# Patient Record
Sex: FEMALE | Race: ASIAN | NOT HISPANIC OR LATINO | Employment: UNEMPLOYED
[De-identification: names, ages, dates, MRNs, and addresses within clinical notes are randomized per-mention and may not be internally consistent; named-entity substitution may affect disease eponyms.]

---

## 2016-01-04 LAB — PAP-ABSTRACT: NORMAL

## 2017-12-03 ENCOUNTER — HEALTH MAINTENANCE LETTER (OUTPATIENT)
Age: 57
End: 2017-12-03

## 2018-02-06 ENCOUNTER — TRANSFERRED RECORDS (OUTPATIENT)
Dept: HEALTH INFORMATION MANAGEMENT | Facility: CLINIC | Age: 58
End: 2018-02-06

## 2019-01-04 ENCOUNTER — OFFICE VISIT (OUTPATIENT)
Dept: FAMILY MEDICINE | Facility: CLINIC | Age: 59
End: 2019-01-04
Payer: COMMERCIAL

## 2019-01-04 VITALS
WEIGHT: 160 LBS | BODY MASS INDEX: 28.35 KG/M2 | TEMPERATURE: 99.9 F | HEIGHT: 63 IN | OXYGEN SATURATION: 96 % | RESPIRATION RATE: 18 BRPM | HEART RATE: 67 BPM

## 2019-01-04 DIAGNOSIS — Z12.39 BREAST CANCER SCREENING: ICD-10-CM

## 2019-01-04 DIAGNOSIS — J20.9 ACUTE BRONCHITIS WITH SYMPTOMS > 10 DAYS: Primary | ICD-10-CM

## 2019-01-04 LAB
DEPRECATED S PYO AG THROAT QL EIA: NORMAL
SPECIMEN SOURCE: NORMAL

## 2019-01-04 PROCEDURE — 99203 OFFICE O/P NEW LOW 30 MIN: CPT | Performed by: PHYSICIAN ASSISTANT

## 2019-01-04 PROCEDURE — 87081 CULTURE SCREEN ONLY: CPT | Performed by: PHYSICIAN ASSISTANT

## 2019-01-04 PROCEDURE — 87880 STREP A ASSAY W/OPTIC: CPT | Performed by: PHYSICIAN ASSISTANT

## 2019-01-04 RX ORDER — AZITHROMYCIN 500 MG/1
500 TABLET, FILM COATED ORAL DAILY
Qty: 3 TABLET | Refills: 0 | Status: SHIPPED | OUTPATIENT
Start: 2019-01-04 | End: 2019-03-28

## 2019-01-04 RX ORDER — CODEINE PHOSPHATE AND GUAIFENESIN 10; 100 MG/5ML; MG/5ML
1-2 SOLUTION ORAL EVERY 4 HOURS PRN
Qty: 120 ML | Refills: 0 | Status: SHIPPED | OUTPATIENT
Start: 2019-01-04 | End: 2019-03-28

## 2019-01-04 ASSESSMENT — MIFFLIN-ST. JEOR: SCORE: 1263.5

## 2019-01-04 ASSESSMENT — PAIN SCALES - GENERAL: PAINLEVEL: MILD PAIN (3)

## 2019-01-04 NOTE — PROGRESS NOTES
"  SUBJECTIVE:   Gary Hinkle is a 58 year old female who presents to clinic today for the following health issues:      ENT Symptoms             Symptoms: cc Present Absent Comment   Fever/Chills  x  102 on monday   Fatigue  x     Muscle Aches  x     Eye Irritation  x  Dry eye   Sneezing  x     Nasal [place]/Drg  x  Only at night   Sinus Pressure/Pain  x     Loss of smell  x     Dental pain   x    Sore Throat  x     Swollen Glands       Ear Pain/Fullness  x     Cough  x     Wheeze  x     Chest Pain  x  Only when coughing   Shortness of breath  x     Rash  x     Other   x      Symptom duration:  5 days   Symptom severity:  moderate   Treatments tried:  Chinese herbal   Contacts:  [employer]         Hyperlipidemia Follow-Up      Rate your low fat/cholesterol diet?: fair    Taking statin?  Now    Other lipid medications/supplements?:  none      Amount of exercise or physical activity: work 4-5 days/week for an average of 4 hours before getting a break    Problems taking medications regularly: No    Medication side effects: none    Diet: regular (no restrictions)        Problem list and histories reviewed & adjusted, as indicated.  Additional history: as documented    ROS:  Constitutional, HEENT, cardiovascular, pulmonary, gi and gu systems are negative, except as otherwise noted.    OBJECTIVE:     Pulse 67   Temp 99.9  F (37.7  C) (Oral)   Resp 18   Ht 1.59 m (5' 2.6\")   Wt 72.6 kg (160 lb)   SpO2 96%   BMI 28.71 kg/m    Body mass index is 28.71 kg/m .  GEN: Well developed, well nourished in NAD.  HEENT: Normocephalic. Eyes: + conjunctival flushing noted. EARS: TMs WNL, Canals clear.  Nose: Edematous mucosa without lesion. No rhinorrhea. Mouth/Pharynx: sl cobblestoning and telangiectasia. No Percussed Sinus tenderness.  NECK: Supple with no anterior cervical lymphadenopathy.  No Thyromegaly  RESP: RUL wheeze with good air entry rest of fields.  SKIN: No Exanthem noted.      Diagnostic Test Results:  none "     ASSESSMENT/PLAN:   1. Acute bronchitis with symptoms > 10 days  - azithromycin (ZITHROMAX) 500 MG tablet; Take 1 tablet (500 mg) by mouth daily for 3 days  Dispense: 3 tablet; Refill: 0  - guaiFENesin-codeine (ROBITUSSIN AC) 100-10 MG/5ML solution; Take 5-10 mLs by mouth every 4 hours as needed for cough  Dispense: 120 mL; Refill: 0    2. Breast cancer screening  - MA SCREENING DIGITAL BILAT - Future  (s+30); Future    Use medication as directed.  Continue supportive OTC measures.   Follow up if symptoms should persist, change or worsen.  Patient amenable to this follow up plan.      Dottie Phillips PA-C  [facility]

## 2019-01-05 LAB
BACTERIA SPEC CULT: NORMAL
SPECIMEN SOURCE: NORMAL

## 2019-02-05 ENCOUNTER — ANCILLARY PROCEDURE (OUTPATIENT)
Dept: MAMMOGRAPHY | Facility: CLINIC | Age: 59
End: 2019-02-05
Payer: COMMERCIAL

## 2019-02-05 DIAGNOSIS — Z12.31 VISIT FOR SCREENING MAMMOGRAM: ICD-10-CM

## 2019-02-05 PROCEDURE — 77067 SCR MAMMO BI INCL CAD: CPT | Mod: TC

## 2019-03-28 ENCOUNTER — OFFICE VISIT (OUTPATIENT)
Dept: FAMILY MEDICINE | Facility: CLINIC | Age: 59
End: 2019-03-28
Payer: COMMERCIAL

## 2019-03-28 VITALS
DIASTOLIC BLOOD PRESSURE: 62 MMHG | BODY MASS INDEX: 28.89 KG/M2 | WEIGHT: 157 LBS | SYSTOLIC BLOOD PRESSURE: 102 MMHG | HEIGHT: 62 IN | RESPIRATION RATE: 16 BRPM | OXYGEN SATURATION: 97 % | TEMPERATURE: 96.2 F | HEART RATE: 66 BPM

## 2019-03-28 DIAGNOSIS — Z00.00 ROUTINE HISTORY AND PHYSICAL EXAMINATION OF ADULT: Primary | ICD-10-CM

## 2019-03-28 DIAGNOSIS — Z23 NEED FOR PROPHYLACTIC VACCINATION AND INOCULATION AGAINST INFLUENZA: ICD-10-CM

## 2019-03-28 DIAGNOSIS — Z11.4 SCREENING FOR HIV (HUMAN IMMUNODEFICIENCY VIRUS): ICD-10-CM

## 2019-03-28 DIAGNOSIS — E03.9 HYPOTHYROIDISM, UNSPECIFIED TYPE: ICD-10-CM

## 2019-03-28 DIAGNOSIS — E78.5 HYPERLIPIDEMIA LDL GOAL <130: ICD-10-CM

## 2019-03-28 DIAGNOSIS — Z12.11 SCREEN FOR COLON CANCER: ICD-10-CM

## 2019-03-28 DIAGNOSIS — Z12.4 SCREENING FOR MALIGNANT NEOPLASM OF CERVIX: ICD-10-CM

## 2019-03-28 DIAGNOSIS — Z11.59 NEED FOR HEPATITIS C SCREENING TEST: ICD-10-CM

## 2019-03-28 DIAGNOSIS — Z12.31 VISIT FOR SCREENING MAMMOGRAM: ICD-10-CM

## 2019-03-28 LAB
ALT SERPL W P-5'-P-CCNC: 45 U/L (ref 0–50)
AST SERPL W P-5'-P-CCNC: 25 U/L (ref 0–45)
CHOLEST SERPL-MCNC: 193 MG/DL
GLUCOSE SERPL-MCNC: 95 MG/DL (ref 70–99)
HCV AB SERPL QL IA: NONREACTIVE
HDLC SERPL-MCNC: 60 MG/DL
LDLC SERPL CALC-MCNC: 105 MG/DL
NONHDLC SERPL-MCNC: 133 MG/DL
TRIGL SERPL-MCNC: 138 MG/DL
TSH SERPL DL<=0.005 MIU/L-ACNC: 2.5 MU/L (ref 0.4–4)

## 2019-03-28 PROCEDURE — 84460 ALANINE AMINO (ALT) (SGPT): CPT | Performed by: FAMILY MEDICINE

## 2019-03-28 PROCEDURE — 82947 ASSAY GLUCOSE BLOOD QUANT: CPT | Performed by: FAMILY MEDICINE

## 2019-03-28 PROCEDURE — 36415 COLL VENOUS BLD VENIPUNCTURE: CPT | Performed by: FAMILY MEDICINE

## 2019-03-28 PROCEDURE — 86803 HEPATITIS C AB TEST: CPT | Performed by: FAMILY MEDICINE

## 2019-03-28 PROCEDURE — 84443 ASSAY THYROID STIM HORMONE: CPT | Performed by: FAMILY MEDICINE

## 2019-03-28 PROCEDURE — G0145 SCR C/V CYTO,THINLAYER,RESCR: HCPCS | Performed by: FAMILY MEDICINE

## 2019-03-28 PROCEDURE — 99396 PREV VISIT EST AGE 40-64: CPT | Performed by: FAMILY MEDICINE

## 2019-03-28 PROCEDURE — 84450 TRANSFERASE (AST) (SGOT): CPT | Performed by: FAMILY MEDICINE

## 2019-03-28 PROCEDURE — G0476 HPV COMBO ASSAY CA SCREEN: HCPCS | Performed by: FAMILY MEDICINE

## 2019-03-28 PROCEDURE — 80061 LIPID PANEL: CPT | Performed by: FAMILY MEDICINE

## 2019-03-28 RX ORDER — SIMVASTATIN 10 MG
10 TABLET ORAL AT BEDTIME
Qty: 90 TABLET | Refills: 3 | Status: SHIPPED | OUTPATIENT
Start: 2019-03-28 | End: 2020-03-24

## 2019-03-28 RX ORDER — CLOBETASOL PROPIONATE 0.5 MG/G
OINTMENT TOPICAL
COMMUNITY
Start: 2018-01-18 | End: 2021-06-04

## 2019-03-28 RX ORDER — LEVOTHYROXINE, LIOTHYRONINE 38; 9 UG/1; UG/1
TABLET ORAL
Refills: 0 | COMMUNITY
Start: 2019-03-08 | End: 2022-02-01

## 2019-03-28 RX ORDER — THYROID 60 MG/1
TABLET ORAL
COMMUNITY
Start: 2015-10-05 | End: 2022-04-13

## 2019-03-28 ASSESSMENT — MIFFLIN-ST. JEOR: SCORE: 1240.4

## 2019-03-28 NOTE — LETTER
[address] [place] [address]    March 29, 2019    Gary Hinkle  [address] [place] [id number]    Dear Gary,    Thyroid is stable   Cholesterol is stable   Continue same medicines   Consider switching Thyroid meds to synthroid    Enclosed is a copy of your results.     Results for orders placed or performed in visit on 03/28/19   Hepatitis C Screen Reflex to HCV RNA Quant and Genotype   Result Value Ref Range    Hepatitis C Antibody Nonreactive NR^Nonreactive   Lipid panel reflex to direct LDL Fasting   Result Value Ref Range    Cholesterol 193 <200 mg/dL    Triglycerides 138 <150 mg/dL    HDL Cholesterol 60 >49 mg/dL    LDL Cholesterol Calculated 105 (H) <100 mg/dL    Non HDL Cholesterol 133 (H) <130 mg/dL   TSH WITH FREE T4 REFLEX   Result Value Ref Range    TSH 2.50 0.40 - 4.00 mU/L   GLUCOSE   Result Value Ref Range    Glucose 95 70 - 99 mg/dL   ALT   Result Value Ref Range    ALT 45 0 - 50 U/L   AST   Result Value Ref Range    AST 25 0 - 45 U/L   If you have any questions or concerns, please call myself or my nurse at [phone].      Sincerely,      Opal Goff MD/sara

## 2019-03-28 NOTE — LETTER
April 4, 2019    Gary Hinkle  [address] [place] [address]    Dear ,  This letter is regarding your recent Pap smear (cervical cancer screening) and Human Papillomavirus (HPV) test.  We are happy to inform you that your Pap smear result is normal. Cervical cancer is closely linked with certain types of HPV. Your results showed no evidence of high-risk HPV.  Therefore we recommend you return in 3 years for your next pap smear.  You will still need to return to the clinic every year for an annual exam and other preventive tests.  If you have additional questions regarding this result, please call our registered nurse, Kailyn at [phone].  Sincerely,    Oapl Goff MD/[facility]

## 2019-03-28 NOTE — PROGRESS NOTES
SUBJECTIVE:   CC: Gary Hinkle is an 58 year old woman who presents for preventive health visit.     Healthy Habits:    Do you get at least three servings of calcium containing foods daily (dairy, green leafy vegetables, etc.)? yes    Amount of exercise or daily activities, outside of work: 5 day(s) per week    Problems taking medications regularly No    Medication side effects: No    Have you had an eye exam in the past two years? yes    Do you see a dentist twice per year? no    Do you have sleep apnea, excessive snoring or daytime drowsiness?yes      Today's PHQ-2 Score:   PHQ-2 ( 1999 [employer]) 3/28/2019 1/4/2019   Q1: Little interest or pleasure in doing things 0 0   Q2: Feeling down, depressed or hopeless 0 0   PHQ-2 Score 0 0     Hyperlipidemia Follow-Up      Rate your low fat/cholesterol diet?: good    Taking statin?  Yes, no muscle aches from statin    Other lipid medications/supplements?:  none    Hypothyroidism Follow-up      Since last visit, patient describes the following symptoms: Weight stable, no hair loss, no skin changes, no constipation, no loose stools  No problems with medicines    Abuse: Current or Past(Physical, Sexual or Emotional)- No  Do you feel safe in your environment? Yes    Social History     Tobacco Use     Smoking status: Never Smoker     Smokeless tobacco: Never Used   Substance Use Topics     Alcohol use: No     If you drink alcohol do you typically have >3 drinks per day or >7 drinks per week? No                     Reviewed orders with patient.  Reviewed health maintenance and updated orders accordingly - Yes  Labs reviewed in EPIC  BP Readings from Last 3 Encounters:   03/28/19 102/62   12/06/13 106/66   09/06/13 118/68    Wt Readings from Last 3 Encounters:   03/28/19 71.2 kg (157 lb)   01/04/19 72.6 kg (160 lb)   12/06/13 68 kg (150 lb)                  Patient Active Problem List   Diagnosis     Hyperlipidemia LDL goal <130     HDL deficiency     Hypothyroidism     Lichen  sclerosus of female genitalia     Constipation     Past Surgical History:   Procedure Laterality Date     APPENDECTOMY         Social History     Tobacco Use     Smoking status: Never Smoker     Smokeless tobacco: Never Used   Substance Use Topics     Alcohol use: No     Family History   Problem Relation Age of Onset     C.A.D. Mother         d age 80         Current Outpatient Medications   Medication Sig Dispense Refill     clobetasol (TEMOVATE) 0.05 % external ointment Apply BID sparingly       diclofenac (VOLTAREN) 1 % topical gel Apply 4 times daily       NP THYROID 60 MG tablet Take 1 tablet daily  0     simvastatin (ZOCOR) 10 MG tablet Take 1 tablet (10 mg) by mouth At Bedtime 90 tablet 3     thyroid (ARMOUR THYROID) 60 MG tablet daily       triamcinolone (KENALOG) 0.1 % ointment Apply sparingly to affected area three times daily for 14 days. (Patient not taking: Reported on 1/4/2019) 15 g 0     No Known Allergies  Recent Labs   Lab Test 12/06/13  0951 08/30/13  0944   LDL 95 131*   HDL 49* 42*   TRIG 95 268*   ALT 41  --    TSH 1.36 32.00*        Mammogram Screening: Patient over age 50, mutual decision to screen reflected in health maintenance.    Pertinent mammograms are reviewed under the imaging tab.  History of abnormal Pap smear: NO - age 30- 65 PAP every 3 years recommended  PAP / HPV 8/30/2013   PAP NIL     Reviewed and updated as needed this visit by clinical staff  Tobacco  Allergies  Meds  Med Hx  Surg Hx  Fam Hx  Soc Hx        Reviewed and updated as needed this visit by Provider        Past Medical History:   Diagnosis Date     Hyperlipidemia LDL goal < 130      Hypothyroidism       Past Surgical History:   Procedure Laterality Date     APPENDECTOMY         ROS:  CONSTITUTIONAL: NEGATIVE for fever, chills, change in weight  INTEGUMENTARY/SKIN: NEGATIVE for worrisome rashes, moles or lesions  EYES: NEGATIVE for vision changes or irritation  ENT: NEGATIVE for ear, mouth and throat  problems  RESP: NEGATIVE for significant cough or SOB  BREAST: NEGATIVE for masses, tenderness or discharge  CV: NEGATIVE for chest pain, palpitations or peripheral edema  GI: NEGATIVE for nausea, abdominal pain, heartburn, or change in bowel habits  : NEGATIVE for unusual urinary or vaginal symptoms. No vaginal bleeding.  MUSCULOSKELETAL: NEGATIVE for significant arthralgias or myalgia  NEURO: NEGATIVE for weakness, dizziness or paresthesias  PSYCHIATRIC: NEGATIVE for changes in mood or affect     OBJECTIVE:   There were no vitals taken for this visit.  EXAM:  GENERAL APPEARANCE: healthy, alert and no distress  EYES: Eyes grossly normal to inspection, PERRL and conjunctivae and sclerae normal  HENT: ear canals and TM's normal, nose and mouth without ulcers or lesions, oropharynx clear and oral mucous membranes moist  NECK: no adenopathy, no asymmetry, masses, or scars and thyroid normal to palpation  RESP: lungs clear to auscultation - no rales, rhonchi or wheezes  BREAST: normal without masses, tenderness or nipple discharge and no palpable axillary masses or adenopathy  CV: regular rate and rhythm, normal S1 S2, no S3 or S4, no murmur, click or rub, no peripheral edema and peripheral pulses strong  ABDOMEN: soft, nontender, no hepatosplenomegaly, no masses and bowel sounds normal   (female): normal female external genitalia, normal urethral meatus, vaginal mucosal atrophy noted, normal cervix, adnexae, and uterus without masses or abnormal discharge  MS: no musculoskeletal defects are noted and gait is age appropriate without ataxia  SKIN: no suspicious lesions or rashes  NEURO: Normal strength and tone, sensory exam grossly normal, mentation intact and speech normal  PSYCH: mentation appears normal and affect normal/bright    Diagnostic Test Results:  Pending     ASSESSMENT/PLAN:   1. Routine history and physical examination of adult      2. Hypothyroidism, unspecified type  Labs pending  Not sure why she is  "on armour Thyroid  Discussed changing to synthroid  - TSH WITH FREE T4 REFLEX    3. Hyperlipidemia LDL goal <130  Labs pending   - Lipid panel reflex to direct LDL Fasting  - GLUCOSE  - ALT  - AST  - simvastatin (ZOCOR) 10 MG tablet; Take 1 tablet (10 mg) by mouth At Bedtime  Dispense: 90 tablet; Refill: 3    4. Screening for malignant neoplasm of cervix    - Pap imaged thin layer screen with HPV - recommended age 30 - 65 years (select HPV order below)    5. Need for hepatitis C screening test    - Hepatitis C Screen Reflex to HCV RNA Quant and Genotype    6. Screen for colon cancer  UTD-see [facility] notes    7. Screening for HIV (human immunodeficiency virus)  Pt declined    8. Need for prophylactic vaccination and inoculation against influenza  Advised     9. Visit for screening mammogram  Advised       COUNSELING:   Reviewed preventive health counseling, as reflected in patient instructions       Regular exercise       Healthy diet/nutrition       Vision screening       Hearing screening       Osteoporosis Prevention/Bone Health       Colon cancer screening       The ASCVD Risk score (Sylwia SIMON Jr., et al., 2013) failed to calculate for the following reasons:    Cannot find a previous HDL lab    Cannot find a previous total cholesterol lab       Advance Care Planning    BP Readings from Last 1 Encounters:   12/06/13 106/66     Estimated body mass index is 28.71 kg/m  as calculated from the following:    Height as of 1/4/19: 1.59 m (5' 2.6\").    Weight as of 1/4/19: 72.6 kg (160 lb).           reports that  has never smoked. she has never used smokeless tobacco.      Counseling Resources:  ATP IV Guidelines  Pooled Cohorts Equation Calculator  Breast Cancer Risk Calculator  FRAX Risk Assessment  ICSI Preventive Guidelines  Dietary Guidelines for Americans, 2010  USDA's MyPlate  ASA Prophylaxis  Lung CA Screening    Opal Goff MD  [facility]  "

## 2019-04-01 LAB
COPATH REPORT: NORMAL
PAP: NORMAL

## 2019-04-02 LAB
FINAL DIAGNOSIS: NORMAL
HPV HR 12 DNA CVX QL NAA+PROBE: NEGATIVE
HPV16 DNA SPEC QL NAA+PROBE: NEGATIVE
HPV18 DNA SPEC QL NAA+PROBE: NEGATIVE
SPECIMEN DESCRIPTION: NORMAL
SPECIMEN SOURCE CVX/VAG CYTO: NORMAL

## 2019-06-06 ENCOUNTER — ANCILLARY PROCEDURE (OUTPATIENT)
Dept: GENERAL RADIOLOGY | Facility: CLINIC | Age: 59
End: 2019-06-06
Attending: FAMILY MEDICINE
Payer: COMMERCIAL

## 2019-06-06 ENCOUNTER — OFFICE VISIT (OUTPATIENT)
Dept: FAMILY MEDICINE | Facility: CLINIC | Age: 59
End: 2019-06-06
Payer: COMMERCIAL

## 2019-06-06 VITALS
DIASTOLIC BLOOD PRESSURE: 58 MMHG | OXYGEN SATURATION: 97 % | RESPIRATION RATE: 18 BRPM | BODY MASS INDEX: 29.54 KG/M2 | SYSTOLIC BLOOD PRESSURE: 96 MMHG | TEMPERATURE: 97.6 F | WEIGHT: 161.5 LBS | HEART RATE: 77 BPM

## 2019-06-06 DIAGNOSIS — M25.562 PAIN AND SWELLING OF LEFT KNEE: ICD-10-CM

## 2019-06-06 DIAGNOSIS — M25.462 PAIN AND SWELLING OF LEFT KNEE: Primary | ICD-10-CM

## 2019-06-06 DIAGNOSIS — M25.462 PAIN AND SWELLING OF LEFT KNEE: ICD-10-CM

## 2019-06-06 DIAGNOSIS — M25.562 PAIN AND SWELLING OF LEFT KNEE: Primary | ICD-10-CM

## 2019-06-06 PROCEDURE — 99214 OFFICE O/P EST MOD 30 MIN: CPT | Performed by: FAMILY MEDICINE

## 2019-06-06 PROCEDURE — 73562 X-RAY EXAM OF KNEE 3: CPT | Mod: LT

## 2019-06-06 RX ORDER — NAPROXEN 500 MG/1
500 TABLET ORAL 2 TIMES DAILY PRN
Qty: 60 TABLET | Refills: 1 | Status: SHIPPED | OUTPATIENT
Start: 2019-06-06 | End: 2019-08-06

## 2019-06-06 ASSESSMENT — PAIN SCALES - GENERAL: PAINLEVEL: EXTREME PAIN (8)

## 2019-06-06 NOTE — PROGRESS NOTES
Kori Hinkle is a 58 year old female who presents to clinic today for the following health issues:    Knee Pain   This is a new problem. The current episode started 1 to 4 weeks ago. The problem occurs constantly. The problem has been gradually worsening. The symptoms are aggravated by standing and walking. She has tried heat for the symptoms. The treatment provided no relief.      Patient presents with left knee pain that started about 2 weeks ago, no injury event recalled, has some swelling, no knee locking, pain is worse when standing for a long time, walking up stairs, worse at the end of the day.    BP Readings from Last 3 Encounters:   06/06/19 96/58   03/28/19 102/62   12/06/13 106/66    Wt Readings from Last 3 Encounters:   06/06/19 73.3 kg (161 lb 8 oz)   03/28/19 71.2 kg (157 lb)   01/04/19 72.6 kg (160 lb)      Reviewed and updated as needed this visit by Provider  Tobacco  Allergies  Meds  Problems  Med Hx  Surg Hx  Fam Hx         Review of Systems   ROS COMP: Constitutional, HEENT, cardiovascular, pulmonary, gi and gu systems are negative, except as otherwise noted.      Objective    BP 96/58   Pulse 77   Temp 97.6  F (36.4  C) (Oral)   Resp 18   Wt 73.3 kg (161 lb 8 oz)   SpO2 97%   BMI 29.54 kg/m     Physical Exam   GENERAL: healthy, alert and no distress  EYES: Eyes grossly normal to inspection, PERRL and conjunctivae and sclerae normal  NECK: no adenopathy, no asymmetry, masses, or scars and thyroid normal to palpation  MS: slight left knee effusion, no joint laxity, some pain elicited with patella manipulation, no IT band tenderness  SKIN: no suspicious lesions or rashes  NEURO: Normal strength and tone, mentation intact and speech normal  PSYCH: mentation appears normal, affect normal/bright          Assessment & Plan       ICD-10-CM    1. Pain and swelling of left knee M25.562 XR Knee Left 3 Views    M25.462 naproxen (NAPROSYN) 500 MG tablet     XR shows arthritis,  recommend ice, PT, NSAIDS, low impact exercises, may desire knee injections in the future.  condroitin tabs    Follow up if symptoms worsen or fail to improve.     Return in about 3 months (around 9/6/2019) for If symptoms persist.    Kaushik Arndt MD  [facility]

## 2019-06-06 NOTE — PATIENT INSTRUCTIONS
Patient Education     Knee Pain with Uncertain Cause    There are several common causes for knee pain. These can include:    A sprain of the ligaments that support the joint    An injury to the cartilage lining of the joint    Arthritis from wear-and-tear or inflammation  There are other causes as well. There may also be swelling, reduced movement of the knee joint, and pain with walking. A definite diagnosis will still need to be made. If your symptoms don't improve, further follow-up and testing may be needed.  Home care    Stay off the injured leg as much as possible until pain improves.    Apply an ice pack over the injured area for 15 to 20 minutes every 3 to 6 hours. You should do this for the first 24 to 48 hours. You can make an ice pack by filling a plastic bag that seals at the top with ice cubes and then wrapping it with a thin towel. Continue to use ice packs for relief of pain and swelling as needed. As the ice melts, be careful not to get your wrap, splint, or cast wet. After 48 hours, apply heat (warm shower or warm bath) for 15 to 20 minutes several times a day, or alternate ice and heat. If you have to wear a hook-and-loop knee brace, you can open it to apply the ice pack, or heat, directly to the knee. Never put ice directly on the skin. Always wrap the ice in a towel or other type of cloth.    You may use over-the-counter pain medicine to control pain, unless another pain medicine was prescribed. If you have chronic liver or kidney disease or ever had a stomach ulcer or gastrointestinal bleeding, talk with your healthcare provider before using these medicines.    If crutches or a walker have been recommended, don't put weight on the injured leg until you can do so without pain. Check with your healthcare provider before returning to sports or full work duties.    If you have a hook-and-loop knee brace, you can remove it to bathe and sleep, unless told otherwise.  Follow-up care  Follow up with  your healthcare provider as advised. This is usually within 1 to 2 weeks.  If X-rays were taken, you will be told of any new findings that may affect your care  Call 911  Call 911 if you have:    Shortness of breath    Chest pain  When to seek medical advice  Call your healthcare provider right away if any of these occur:    Toes or foot becomes swollen, cold, blue, numb, or tingly    Pain or swelling spreads over the knee or calf    Warmth or redness appears over the knee or calf    Other joints become painful    Rash appears    Fever of 100.4 F (38 C) or higher, or as directed by your healthcare provider    Chills  Date Last Reviewed: 5/1/2018 2000-2018 [employer]. [address] [place] [address], [place] [address]. All rights reserved. This information is not intended as a substitute for professional medical care. Always follow your healthcare professional's instructions.

## 2019-06-10 DIAGNOSIS — M17.12 PATELLOFEMORAL ARTHRITIS OF LEFT KNEE: Primary | ICD-10-CM

## 2019-06-13 ENCOUNTER — THERAPY VISIT (OUTPATIENT)
Dept: PHYSICAL THERAPY | Facility: CLINIC | Age: 59
End: 2019-06-13
Attending: FAMILY MEDICINE
Payer: COMMERCIAL

## 2019-06-13 DIAGNOSIS — M76.899 HAMSTRING TENDINITIS: ICD-10-CM

## 2019-06-13 DIAGNOSIS — M17.12 PATELLOFEMORAL ARTHRITIS OF LEFT KNEE: ICD-10-CM

## 2019-06-13 DIAGNOSIS — M25.562 LEFT KNEE PAIN: ICD-10-CM

## 2019-06-13 PROCEDURE — 97161 PT EVAL LOW COMPLEX 20 MIN: CPT | Mod: GP | Performed by: PHYSICAL THERAPIST

## 2019-06-13 PROCEDURE — 97110 THERAPEUTIC EXERCISES: CPT | Mod: GP | Performed by: PHYSICAL THERAPIST

## 2019-06-13 ASSESSMENT — ACTIVITIES OF DAILY LIVING (ADL)
WEAKNESS: THE SYMPTOM AFFECTS MY ACTIVITY SLIGHTLY
KNEEL ON THE FRONT OF YOUR KNEE: ACTIVITY IS VERY DIFFICULT
GO UP STAIRS: ACTIVITY IS FAIRLY DIFFICULT
LIMPING: THE SYMPTOM AFFECTS MY ACTIVITY MODERATELY
KNEE_ACTIVITY_OF_DAILY_LIVING_SUM: 30
AS_A_RESULT_OF_YOUR_KNEE_INJURY,_HOW_WOULD_YOU_RATE_YOUR_CURRENT_LEVEL_OF_DAILY_ACTIVITY?: ABNORMAL
GIVING WAY, BUCKLING OR SHIFTING OF KNEE: THE SYMPTOM AFFECTS MY ACTIVITY SLIGHTLY
RISE FROM A CHAIR: ACTIVITY IS FAIRLY DIFFICULT
WALK: ACTIVITY IS FAIRLY DIFFICULT
HOW_WOULD_YOU_RATE_THE_CURRENT_FUNCTION_OF_YOUR_KNEE_DURING_YOUR_USUAL_DAILY_ACTIVITIES_ON_A_SCALE_FROM_0_TO_100_WITH_100_BEING_YOUR_LEVEL_OF_KNEE_FUNCTION_PRIOR_TO_YOUR_INJURY_AND_0_BEING_THE_INABILITY_TO_PERFORM_ANY_OF_YOUR_USUAL_DAILY_ACTIVITIES?: 40
GO DOWN STAIRS: ACTIVITY IS FAIRLY DIFFICULT
STIFFNESS: THE SYMPTOM AFFECTS MY ACTIVITY MODERATELY
SIT WITH YOUR KNEE BENT: ACTIVITY IS FAIRLY DIFFICULT
SQUAT: ACTIVITY IS VERY DIFFICULT
KNEE_ACTIVITY_OF_DAILY_LIVING_SCORE: 42.86
SWELLING: THE SYMPTOM AFFECTS MY ACTIVITY SLIGHTLY
PAIN: THE SYMPTOM AFFECTS MY ACTIVITY MODERATELY
RAW_SCORE: 30
STAND: ACTIVITY IS SOMEWHAT DIFFICULT
HOW_WOULD_YOU_RATE_THE_OVERALL_FUNCTION_OF_YOUR_KNEE_DURING_YOUR_USUAL_DAILY_ACTIVITIES?: ABNORMAL

## 2019-06-13 NOTE — PROGRESS NOTES
[facility] Initial Evaluation  Subjective:                                     General health as reported by patient is good.  Pertinent medical history includes:  Thyroid problems.  Medical allergies: no.  Other surgeries include:  None reported.  Current medications:  Anti-inflammatory and thyroid medication.  Current occupation is Food prep.                                    Objective:  System    Physical Exam    General     ROS    Assessment/Plan:

## 2019-06-13 NOTE — LETTER
SRAVANI CAMPOVERDE PT  [address]  [address]  [place] [address]  [phone]    2019    Re: Gary Hinkle   :   1960  MRN:  [id number]   REFERRING PHYSICIAN:   MD SRAVANI Ladd PT  Date of Initial Evaluation:  2019  Visits:  Rxs Used: 1  Reason for Referral:     Patellofemoral arthritis of left knee  Left knee pain  Hamstring tendinitis    EVALUATION SUMMARY    [facility] Initial Evaluation  Subjective:  Gary Hinkle is a 58 year old female with a left knee condition.  Condition occurred with:  Insidious onset.  Condition occurred: for unknown reasons.  This is a new condition  Patient reports onset of left knee pain in May 2019 without a specific mechanism of injury or change in daily routine..    Patient reports pain:  Anterior and lateral.  Radiates to:  Knee.  Pain is described as aching and is constant and reported as 6/10.  Associated symptoms:  Loss of motion/stiffness and buckling/giving out. Pain is worse during the day.  Symptoms are exacerbated by ascending stairs, descending stairs, standing and walking   Since onset symptoms are unchanged.  Special tests:  X-ray (see epic).  General health as reported by patient is good.                  Red flags:  None as reported by the patient.    Objective:  Gait:    Deviations:  Knee:  Knee extension decr L    Knee Evaluation:  ROM:  Strength wnl knee: grossly 4/5 on left with fair quad set.  AROM  Extension: Left: 10 deg from straight    Right:   Flexion: Left: 100 deg   Right:  Pain: reported at end range left knee flexion and extension    Ligament Testing:  Normal    Special Tests:   Left knee positive for the following special tests:  Meniscal (OA lateral compartment)      Re: Gary Hinkle   :   1960    Palpation:    Left knee tenderness present at:  Lateral Joint Line and Biceps Femoral    Edema:  Normal  Mobility Testing:  Normal  Functional Testing:  not  assessed    Assessment/Plan:    Patient is a 58 year old female with left side knee complaints.    Patient has the following significant findings with corresponding treatment plan.                Diagnosis 1:  Left knee pain  Pain -  hot/cold therapy, manual therapy, splint/taping/bracing/orthotics, self management, education and home program  Decreased ROM/flexibility - manual therapy, therapeutic exercise, therapeutic activity and home program  Decreased strength - therapeutic exercise, therapeutic activities and home program  Impaired gait - home program  Impaired muscle performance - neuro re-education and home program  Decreased function - therapeutic activities and home program    Therapy Evaluation Codes:   1) History comprised of:   Personal factors that impact the plan of care:      None.    Comorbidity factors that impact the plan of care are:      None.     Medications impacting care: None.  2) Examination of Body Systems comprised of:   Body structures and functions that impact the plan of care:      Knee.   Activity limitations that impact the plan of care are:      Bathing, Dressing, Squatting/kneeling, Stairs, Standing and Walking.  3) Clinical presentation characteristics are:   Stable/Uncomplicated.  4) Decision-Making    Low complexity using standardized patient assessment instrument and/or measureable assessment of functional outcome.  Cumulative Therapy Evaluation is: Low complexity.    Previous and current functional limitations:  (See Goal Flow Sheet for this information)    Short term and Long term goals: (See Goal Flow Sheet for this information)     Communication ability:  Patient needs an  to clearly communicate and understand verbal and written communication and follow directions correctly.  Treatment Explanation - The following has been discussed with the patient:   RX ordered/plan of care  Anticipated outcomes  Possible risks and side effects  This patient would benefit from  PT intervention to resume normal activities.   Rehab potential is good.    Re: Gary Hinkle   :   1960    Frequency:  1 X week, once daily  Duration:  for 6 weeks  Discharge Plan:  Achieve all LTG.  Independent in home treatment program.  Reach maximal therapeutic benefit.    Please refer to the daily flowsheet for treatment today, total treatment time and time spent performing 1:1 timed codes.       Thank you for your referral.    INQUIRIES  Therapist: MARTITA Dutton PT  [address] [facility] [address] [facility] [address] [facility]  [address]  [place] [address]  Phone: [phone]  Fax: [phone]

## 2019-06-13 NOTE — PROGRESS NOTES
[facility] Initial Evaluation  Subjective:    Gary Hinkle is a 58 year old female with a left knee condition.  Condition occurred with:  Insidious onset.  Condition occurred: for unknown reasons.  This is a new condition  Patient reports onset of left knee pain in May 2019 without a specific mechanism of injury or change in daily routine..    Patient reports pain:  Anterior and lateral.  Radiates to:  Knee.  Pain is described as aching and is constant and reported as 6/10.  Associated symptoms:  Loss of motion/stiffness and buckling/giving out. Pain is worse during the day.  Symptoms are exacerbated by ascending stairs, descending stairs, standing and walking   Since onset symptoms are unchanged.  Special tests:  X-ray (see epic).      General health as reported by patient is good.                      Red flags:  None as reported by the patient.                        Objective:    Gait:      Deviations:  Knee:  Knee extension decr L                                                      Knee Evaluation:  ROM:  Strength wnl knee: grossly 4/5 on left with fair quad set.  AROM      Extension: Left: 10 deg from straight    Right:   Flexion: Left: 100 deg   Right:    Pain: reported at end range left knee flexion and extension      Ligament Testing:  Normal                Special Tests:   Left knee positive for the following special tests:  Meniscal (OA lateral compartment)    Palpation:    Left knee tenderness present at:  Lateral Joint Line and Biceps Femoral    Edema:  Normal    Mobility Testing:  Normal            Functional Testing:  not assessed                  General     ROS    Assessment/Plan:    Patient is a 58 year old female with left side knee complaints.    Patient has the following significant findings with corresponding treatment plan.                Diagnosis 1:  Left knee pain  Pain -  hot/cold therapy, manual therapy, splint/taping/bracing/orthotics, self management, education and  home program  Decreased ROM/flexibility - manual therapy, therapeutic exercise, therapeutic activity and home program  Decreased strength - therapeutic exercise, therapeutic activities and home program  Impaired gait - home program  Impaired muscle performance - neuro re-education and home program  Decreased function - therapeutic activities and home program    Therapy Evaluation Codes:   1) History comprised of:   Personal factors that impact the plan of care:      None.    Comorbidity factors that impact the plan of care are:      None.     Medications impacting care: None.  2) Examination of Body Systems comprised of:   Body structures and functions that impact the plan of care:      Knee.   Activity limitations that impact the plan of care are:      Bathing, Dressing, Squatting/kneeling, Stairs, Standing and Walking.  3) Clinical presentation characteristics are:   Stable/Uncomplicated.  4) Decision-Making    Low complexity using standardized patient assessment instrument and/or measureable assessment of functional outcome.  Cumulative Therapy Evaluation is: Low complexity.    Previous and current functional limitations:  (See Goal Flow Sheet for this information)    Short term and Long term goals: (See Goal Flow Sheet for this information)     Communication ability:  Patient needs an  to clearly communicate and understand verbal and written communication and follow directions correctly.  Treatment Explanation - The following has been discussed with the patient:   RX ordered/plan of care  Anticipated outcomes  Possible risks and side effects  This patient would benefit from PT intervention to resume normal activities.   Rehab potential is good.    Frequency:  1 X week, once daily  Duration:  for 6 weeks  Discharge Plan:  Achieve all LTG.  Independent in home treatment program.  Reach maximal therapeutic benefit.    Please refer to the daily flowsheet for treatment today, total treatment time and time  spent performing 1:1 timed codes.

## 2019-06-20 ENCOUNTER — THERAPY VISIT (OUTPATIENT)
Dept: PHYSICAL THERAPY | Facility: CLINIC | Age: 59
End: 2019-06-20
Payer: COMMERCIAL

## 2019-06-20 DIAGNOSIS — M76.899 HAMSTRING TENDINITIS: ICD-10-CM

## 2019-06-20 DIAGNOSIS — M25.562 LEFT KNEE PAIN: ICD-10-CM

## 2019-06-20 PROCEDURE — 97110 THERAPEUTIC EXERCISES: CPT | Mod: GP

## 2019-06-27 ENCOUNTER — THERAPY VISIT (OUTPATIENT)
Dept: PHYSICAL THERAPY | Facility: CLINIC | Age: 59
End: 2019-06-27
Payer: COMMERCIAL

## 2019-06-27 DIAGNOSIS — M76.899 HAMSTRING TENDINITIS: ICD-10-CM

## 2019-06-27 DIAGNOSIS — M25.562 LEFT KNEE PAIN: ICD-10-CM

## 2019-06-27 PROCEDURE — 97530 THERAPEUTIC ACTIVITIES: CPT | Mod: GP

## 2019-07-11 ENCOUNTER — THERAPY VISIT (OUTPATIENT)
Dept: PHYSICAL THERAPY | Facility: CLINIC | Age: 59
End: 2019-07-11
Payer: COMMERCIAL

## 2019-07-11 DIAGNOSIS — M25.562 LEFT KNEE PAIN: ICD-10-CM

## 2019-07-11 DIAGNOSIS — M76.899 HAMSTRING TENDINITIS: ICD-10-CM

## 2019-07-11 PROCEDURE — 97110 THERAPEUTIC EXERCISES: CPT | Mod: GP | Performed by: PHYSICAL THERAPIST

## 2019-07-11 PROCEDURE — 97530 THERAPEUTIC ACTIVITIES: CPT | Mod: GP | Performed by: PHYSICAL THERAPIST

## 2019-07-11 NOTE — PROGRESS NOTES
Subjective:  HPI                    Objective:  System    Physical Exam    General     ROS    Assessment/Plan:    DISCHARGE REPORT    Progress reporting period is from 6/27/2019 to 7/11/2019.       SUBJECTIVE  Subjective changes noted by patient:  Subjective: Patient reports no longer having knee pain and has no complaints at this time.     Current pain level is  Current Pain level: 0/10.     Previous pain level was   Initial Pain level: 6/10.   Changes in function:  Yes (See Goal flowsheet attached for changes in current functional level)  Adverse reaction to treatment or activity: None    OBJECTIVE  Changes noted in objective findings:  Yes,   Objective: unremarkable knee exam. AROM and strength WNL throughout bilaterally. unremarkable knee special testing     ASSESSMENT/PLAN  Updated problem list and treatment plan: Diagnosis 1:  Left knee pain    STG/LTGs have been met or progress has been made towards goals:  Yes (See Goal flow sheet completed today.)  Assessment of Progress: The patient's condition is improving.  The patient has met all of their long term goals.  Self Management Plans:  Patient is independent in a home treatment program.  Patient is independent in self management of symptoms.  I have re-evaluated this patient and find that the nature, scope, duration and intensity of the therapy is appropriate for the medical condition of the patient.  Gary continues to require the following intervention to meet STG and LTG's:  PT intervention is no longer required to meet STG/LTG.    Recommendations:  This patient is ready to be discharged from therapy and continue their home treatment program.    Please refer to the daily flowsheet for treatment today, total treatment time and time spent performing 1:1 timed codes.

## 2019-08-02 DIAGNOSIS — M25.562 PAIN AND SWELLING OF LEFT KNEE: ICD-10-CM

## 2019-08-02 DIAGNOSIS — M25.462 PAIN AND SWELLING OF LEFT KNEE: ICD-10-CM

## 2019-08-02 NOTE — TELEPHONE ENCOUNTER
"Routing refill request to provider for review/approval because:  Labs not current:      Requested Prescriptions   Pending Prescriptions Disp Refills     naproxen (NAPROSYN) 500 MG tablet  Last Written Prescription Date:  6/6/19  Last Fill Quantity: 60,  # refills: 1   Last office visit: 6/6/2019 with prescribing provider:     Future Office Visit:   60 tablet 1     Sig: Take 1 tablet (500 mg) by mouth 2 times daily as needed for moderate pain Take with food and a full glass of water       NSAID Medications Failed - 8/2/2019  9:34 AM        Failed - Normal CBC on file in past 12 months     Recent Labs   Lab Test 12/06/13  0951   WBC 4.0   RBC 4.37   HGB 13.3   HCT 39.7                    Failed - Normal serum creatinine on file in past 12 months     No lab results found.          Passed - Blood pressure under 140/90 in past 12 months     BP Readings from Last 3 Encounters:   06/06/19 96/58   03/28/19 102/62   12/06/13 106/66                 Passed - Normal ALT on file in past 12 months     Recent Labs   Lab Test 03/28/19  0835   ALT 45             Passed - Normal AST on file in past 12 months     Recent Labs   Lab Test 03/28/19  0835   AST 25             Passed - Recent (12 mo) or future (30 days) visit within the authorizing provider's specialty     Patient had office visit in the last 12 months or has a visit in the next 30 days with authorizing provider or within the authorizing provider's specialty.  See \"Patient Info\" tab in inbasket, or \"Choose Columns\" in Meds & Orders section of the refill encounter.              Passed - Patient is age 6-64 years        Passed - Medication is active on med list        Passed - No active pregnancy on record        Passed - No positive pregnancy test in past 12 months        Flower Aldrich, RN - BC      "

## 2019-08-06 RX ORDER — NAPROXEN 500 MG/1
500 TABLET ORAL 2 TIMES DAILY PRN
Qty: 60 TABLET | Refills: 1 | Status: SHIPPED | OUTPATIENT
Start: 2019-08-06 | End: 2019-10-28

## 2019-08-29 ENCOUNTER — TELEPHONE (OUTPATIENT)
Dept: FAMILY MEDICINE | Facility: CLINIC | Age: 59
End: 2019-08-29

## 2019-08-29 NOTE — TELEPHONE ENCOUNTER
Aspirin 81 mg delayed release. Take 1 tablet by mouth once daily with a meal.           Last Written Prescription Date:  na  Last Fill Quantity: na,   # refills: na  Last Office Visit: 6/6/19  Future Office visit:       Routing refill request to provider for review/approval because:  Drug not active on patient's medication list

## 2019-10-24 DIAGNOSIS — M25.562 PAIN AND SWELLING OF LEFT KNEE: ICD-10-CM

## 2019-10-24 DIAGNOSIS — M25.462 PAIN AND SWELLING OF LEFT KNEE: ICD-10-CM

## 2019-10-28 RX ORDER — NAPROXEN 500 MG/1
500 TABLET ORAL 2 TIMES DAILY PRN
Qty: 60 TABLET | Refills: 1 | Status: SHIPPED | OUTPATIENT
Start: 2019-10-28 | End: 2020-01-23

## 2019-10-28 NOTE — TELEPHONE ENCOUNTER
"Routing refill request to provider for review/approval because:  Labs not current:  CBC, Cr    Requested Prescriptions   Pending Prescriptions Disp Refills     naproxen (NAPROSYN) 500 MG tablet 60 tablet 1     Sig: Take 1 tablet (500 mg) by mouth 2 times daily as needed for moderate pain Take with food and a full glass of water       NSAID Medications Failed - 10/24/2019  5:07 PM        Failed - Normal CBC on file in past 12 months     Recent Labs   Lab Test 12/06/13  0951   WBC 4.0   RBC 4.37   HGB 13.3   HCT 39.7                    Failed - Normal serum creatinine on file in past 12 months     No lab results found.          Passed - Blood pressure under 140/90 in past 12 months     BP Readings from Last 3 Encounters:   06/06/19 96/58   03/28/19 102/62   12/06/13 106/66                 Passed - Normal ALT on file in past 12 months     Recent Labs   Lab Test 03/28/19  0835   ALT 45             Passed - Normal AST on file in past 12 months     Recent Labs   Lab Test 03/28/19  0835   AST 25             Passed - Recent (12 mo) or future (30 days) visit within the authorizing provider's specialty     Patient has had an office visit with the authorizing provider or a provider within the authorizing providers department within the previous 12 mos or has a future within next 30 days. See \"Patient Info\" tab in inbasket, or \"Choose Columns\" in Meds & Orders section of the refill encounter.              Passed - Patient is age 6-64 years        Passed - Medication is active on med list        Passed - No active pregnancy on record        Passed - No positive pregnancy test in past 12 months        Kimmie Johnston RN  "

## 2020-01-02 ENCOUNTER — OFFICE VISIT (OUTPATIENT)
Dept: OPHTHALMOLOGY | Facility: CLINIC | Age: 60
End: 2020-01-02
Payer: COMMERCIAL

## 2020-01-02 DIAGNOSIS — H02.834 DERMATOCHALASIS OF BOTH UPPER EYELIDS: ICD-10-CM

## 2020-01-02 DIAGNOSIS — Z01.00 EXAMINATION OF EYES AND VISION: Primary | ICD-10-CM

## 2020-01-02 DIAGNOSIS — H52.223 REGULAR ASTIGMATISM OF BOTH EYES: ICD-10-CM

## 2020-01-02 DIAGNOSIS — H52.4 PRESBYOPIA: ICD-10-CM

## 2020-01-02 DIAGNOSIS — H02.831 DERMATOCHALASIS OF BOTH UPPER EYELIDS: ICD-10-CM

## 2020-01-02 DIAGNOSIS — H52.13 HIGH MYOPIA, BILATERAL: ICD-10-CM

## 2020-01-02 DIAGNOSIS — H43.812 POSTERIOR VITREOUS DETACHMENT OF LEFT EYE: ICD-10-CM

## 2020-01-02 PROCEDURE — 92004 COMPRE OPH EXAM NEW PT 1/>: CPT | Performed by: STUDENT IN AN ORGANIZED HEALTH CARE EDUCATION/TRAINING PROGRAM

## 2020-01-02 PROCEDURE — 92015 DETERMINE REFRACTIVE STATE: CPT | Performed by: STUDENT IN AN ORGANIZED HEALTH CARE EDUCATION/TRAINING PROGRAM

## 2020-01-02 ASSESSMENT — EXTERNAL EXAM - LEFT EYE: OS_EXAM: NORMAL

## 2020-01-02 ASSESSMENT — TONOMETRY
OD_IOP_MMHG: 18
IOP_METHOD: APPLANATION
OS_IOP_MMHG: 18

## 2020-01-02 ASSESSMENT — REFRACTION_MANIFEST
OS_AXIS: 085
OS_CYLINDER: +1.75
OS_SPHERE: -7.00
OD_SPHERE: -9.75
OS_ADD: +2.50
OD_ADD: +2.50
OD_CYLINDER: +1.75
OD_AXIS: 073

## 2020-01-02 ASSESSMENT — REFRACTION_WEARINGRX
OD_AXIS: 073
OS_CYLINDER: +1.75
OS_AXIS: 085
OD_CYLINDER: +1.75
OS_SPHERE: -7.00
OD_SPHERE: -9.75

## 2020-01-02 ASSESSMENT — CUP TO DISC RATIO
OD_RATIO: 0.25
OS_RATIO: 0.2

## 2020-01-02 ASSESSMENT — VISUAL ACUITY
METHOD: SNELLEN - LINEAR
OS_CC: 20/25
CORRECTION_TYPE: GLASSES
OD_CC: 20/20-2

## 2020-01-02 ASSESSMENT — CONF VISUAL FIELD
OD_NORMAL: 1
OS_NORMAL: 1

## 2020-01-02 ASSESSMENT — SLIT LAMP EXAM - LIDS
COMMENTS: 1+ DERMATOCHALASIS
COMMENTS: 1+ DERMATOCHALASIS

## 2020-01-02 ASSESSMENT — EXTERNAL EXAM - RIGHT EYE: OD_EXAM: NORMAL

## 2020-01-02 NOTE — LETTER
1/2/2020         RE: Gary Hinkle  [address] [place] [id number]        Dear Colleague,    Thank you for referring your patient, Gary Hinkle, to the [facility]. Please see a copy of my visit note below.     Current Eye Medications:  no     Subjective:  Comprehensive eye exam.   Pt reports that she sees well in distance, however she has to take her glasses off to see to read.    Saw optometrist at [facility] in 2016 (note in Epic). No previous eye injuries or surgeries.      Objective:  See Ophthalmology Exam.       Assessment:  Gary Hinkle is a 59 year old female who presents with:   Encounter Diagnoses   Name Primary?     Examination of eyes and vision      High myopia of both eyes      Regular astigmatism of both eyes      Presbyopia        Posterior vitreous detachment of left eye      Dermatochalasis of both upper eyelids        Plan:  Glasses prescription given - optional to update    Daria Negrete MD  [phone]        Again, thank you for allowing me to participate in the care of your patient.        Sincerely,        Daria Negrete MD

## 2020-01-02 NOTE — PROGRESS NOTES
Current Eye Medications:  no     Subjective:  Comprehensive eye exam.   Pt reports that she sees well in distance, however she has to take her glasses off to see to read.    Saw optometrist at [facility] in 2016 (note in Epic). No previous eye injuries or surgeries.      Objective:  See Ophthalmology Exam.       Assessment:  Gary Hinkle is a 59 year old female who presents with:   Encounter Diagnoses   Name Primary?     Examination of eyes and vision      High myopia of both eyes      Regular astigmatism of both eyes      Presbyopia        Posterior vitreous detachment of left eye      Dermatochalasis of both upper eyelids        Plan:  Glasses prescription given - optional to update    Daria Negrete MD  [phone]

## 2020-02-27 DIAGNOSIS — M25.462 PAIN AND SWELLING OF LEFT KNEE: ICD-10-CM

## 2020-02-27 DIAGNOSIS — M25.562 PAIN AND SWELLING OF LEFT KNEE: ICD-10-CM

## 2020-02-28 NOTE — TELEPHONE ENCOUNTER
"Requested Prescriptions   Pending Prescriptions Disp Refills     naproxen (NAPROSYN) 500 MG tablet [Pharmacy Med Name: Naproxen Oral Tablet 500 MG] 60 tablet 0     Sig: Take 1 tablet (500 mg) by mouth 2 times daily as needed for moderate pain Take with food and a full glass of water       NSAID Medications Failed - 2/27/2020  1:24 PM        Failed - Normal CBC on file in past 12 months     Recent Labs   Lab Test 12/06/13  0951   WBC 4.0   RBC 4.37   HGB 13.3   HCT 39.7                    Failed - Normal serum creatinine on file in past 12 months     No lab results found.          Passed - Blood pressure under 140/90 in past 12 months     BP Readings from Last 3 Encounters:   06/06/19 96/58   03/28/19 102/62   12/06/13 106/66                 Passed - Normal ALT on file in past 12 months     Recent Labs   Lab Test 03/28/19  0835   ALT 45             Passed - Normal AST on file in past 12 months     Recent Labs   Lab Test 03/28/19  0835   AST 25             Passed - Recent (12 mo) or future (30 days) visit within the authorizing provider's specialty     Patient has had an office visit with the authorizing provider or a provider within the authorizing providers department within the previous 12 mos or has a future within next 30 days. See \"Patient Info\" tab in inbasket, or \"Choose Columns\" in Meds & Orders section of the refill encounter.              Passed - Patient is age 6-64 years        Passed - Medication is active on med list        Passed - No active pregnancy on record        Passed - No positive pregnancy test in past 12 months          "

## 2020-03-02 RX ORDER — NAPROXEN 500 MG/1
TABLET ORAL
Qty: 60 TABLET | Refills: 0 | Status: SHIPPED | OUTPATIENT
Start: 2020-03-02 | End: 2021-04-26

## 2020-03-23 DIAGNOSIS — E78.5 HYPERLIPIDEMIA LDL GOAL <130: ICD-10-CM

## 2020-03-24 RX ORDER — SIMVASTATIN 10 MG
10 TABLET ORAL AT BEDTIME
Qty: 90 TABLET | Refills: 0 | Status: SHIPPED | OUTPATIENT
Start: 2020-03-24 | End: 2020-04-21

## 2020-07-29 DIAGNOSIS — E78.5 HYPERLIPIDEMIA LDL GOAL <130: ICD-10-CM

## 2020-08-03 RX ORDER — SIMVASTATIN 10 MG
TABLET ORAL
Qty: 30 TABLET | Refills: 0 | Status: SHIPPED | OUTPATIENT
Start: 2020-08-03 | End: 2020-08-20

## 2020-08-03 NOTE — TELEPHONE ENCOUNTER
Called with the aid of a Mandarin .    Spoke with Gary  to schedule an appointment.  Linda Cordova,     Needs an appointment for refills.    Next 5 appointments (look out 90 days)    Aug 20, 2020 10:00 AM CDT  Office Visit with Opal Goff MD  [facility] ([facility]) [address] [facility] [address]  [phone]       Linda Cordova,

## 2020-08-20 ENCOUNTER — OFFICE VISIT (OUTPATIENT)
Dept: FAMILY MEDICINE | Facility: CLINIC | Age: 60
End: 2020-08-20
Payer: COMMERCIAL

## 2020-08-20 VITALS
TEMPERATURE: 98.2 F | HEIGHT: 63 IN | SYSTOLIC BLOOD PRESSURE: 114 MMHG | BODY MASS INDEX: 28 KG/M2 | OXYGEN SATURATION: 98 % | HEART RATE: 61 BPM | DIASTOLIC BLOOD PRESSURE: 66 MMHG | WEIGHT: 158 LBS

## 2020-08-20 DIAGNOSIS — Z12.31 VISIT FOR SCREENING MAMMOGRAM: Primary | ICD-10-CM

## 2020-08-20 DIAGNOSIS — E03.9 HYPOTHYROIDISM, UNSPECIFIED TYPE: ICD-10-CM

## 2020-08-20 DIAGNOSIS — E78.5 HYPERLIPIDEMIA LDL GOAL <130: ICD-10-CM

## 2020-08-20 LAB
CHOLEST SERPL-MCNC: 168 MG/DL
GLUCOSE SERPL-MCNC: 99 MG/DL (ref 70–99)
HDLC SERPL-MCNC: 45 MG/DL
LDLC SERPL CALC-MCNC: 79 MG/DL
NONHDLC SERPL-MCNC: 123 MG/DL
TRIGL SERPL-MCNC: 219 MG/DL
TSH SERPL DL<=0.005 MIU/L-ACNC: 3.62 MU/L (ref 0.4–4)

## 2020-08-20 PROCEDURE — 84443 ASSAY THYROID STIM HORMONE: CPT | Performed by: FAMILY MEDICINE

## 2020-08-20 PROCEDURE — 36415 COLL VENOUS BLD VENIPUNCTURE: CPT | Performed by: FAMILY MEDICINE

## 2020-08-20 PROCEDURE — 80061 LIPID PANEL: CPT | Performed by: FAMILY MEDICINE

## 2020-08-20 PROCEDURE — 82947 ASSAY GLUCOSE BLOOD QUANT: CPT | Performed by: FAMILY MEDICINE

## 2020-08-20 PROCEDURE — 99213 OFFICE O/P EST LOW 20 MIN: CPT | Performed by: FAMILY MEDICINE

## 2020-08-20 RX ORDER — SIMVASTATIN 10 MG
10 TABLET ORAL AT BEDTIME
Qty: 90 TABLET | Refills: 3 | Status: SHIPPED | OUTPATIENT
Start: 2020-08-20 | End: 2021-08-19

## 2020-08-20 ASSESSMENT — MIFFLIN-ST. JEOR: SCORE: 1247.87

## 2020-08-20 NOTE — LETTER
August 20, 2020      Gary Hinkle  [address] [place], [place] [address] [address]          Dear ,    We are writing to inform you of your test results.  Cholesterol and Blood sugar is Good       Resulted Orders   Lipid panel reflex to direct LDL Fasting   Result Value Ref Range    Cholesterol 168 <200 mg/dL    Triglycerides 219 (H) <150 mg/dL      Comment:      Borderline high:  150-199 mg/dl  High:             200-499 mg/dl  Very high:       >499 mg/dl  Fasting specimen      HDL Cholesterol 45 (L) >49 mg/dL    LDL Cholesterol Calculated 79 <100 mg/dL      Comment:      Desirable:       <100 mg/dl    Non HDL Cholesterol 123 <130 mg/dL   Glucose   Result Value Ref Range    Glucose 99 70 - 99 mg/dL      Comment:      Fasting specimen             If you have any questions or concerns, please call the clinic at the number listed above.       Sincerely,        Opal Goff MD

## 2020-08-20 NOTE — PROGRESS NOTES
"Kori Hinkle is a 59 year old female who presents to clinic today for the following health issues:    HPI       Hyperlipidemia Follow-Up      Are you regularly taking any medication or supplement to lower your cholesterol?   Yes- Simvastatin    Are you having muscle aches or other side effects that you think could be caused by your cholesterol lowering medication?  No      How many servings of fruits and vegetables do you eat daily?  4 or more    On average, how many sweetened beverages do you drink each day (Examples: soda, juice, sweet tea, etc.  Do NOT count diet or artificially sweetened beverages)?   1    How many days per week do you exercise enough to make your heart beat faster?     How many minutes a day do you exercise enough to make your heart beat faster?     How many days per week do you miss taking your medication? 0    Hypothyroidism Follow-up      Since last visit, patient describes the following symptoms: Weight stable, no hair loss, no skin changes, no constipation, no loose stools    Review of Systems   CONSTITUTIONAL: NEGATIVE for fever, chills, change in weight  ENT/MOUTH: NEGATIVE for ear, mouth and throat problems  RESP: NEGATIVE for significant cough or SOB  CV: NEGATIVE for chest pain, palpitations or peripheral edema  GI: NEGATIVE for nausea, abdominal pain, heartburn, or change in bowel habits  ROS otherwise negative      Objective    /66   Pulse 61   Temp 98.2  F (36.8  C) (Oral)   Ht 1.588 m (5' 2.5\")   Wt 71.7 kg (158 lb)   SpO2 98%   Breastfeeding No   BMI 28.44 kg/m    Body mass index is 28.44 kg/m .  Physical Exam   GENERAL: healthy, alert and no distress  NECK: no adenopathy, no asymmetry, masses, or scars and thyroid normal to palpation  RESP: lungs clear to auscultation - no rales, rhonchi or wheezes  CV: regular rate and rhythm, normal S1 S2, no S3 or S4, no murmur, click or rub, no peripheral edema and peripheral pulses strong  ABDOMEN: soft, nontender, " no hepatosplenomegaly, no masses and bowel sounds normal  MS: no gross musculoskeletal defects noted, no edema            Assessment & Plan     Hyperlipidemia LDL goal <130  Labs pending   - simvastatin (ZOCOR) 10 MG tablet; Take 1 tablet (10 mg) by mouth At Bedtime  - Lipid panel reflex to direct LDL Fasting  - Glucose    Visit for screening mammogram  Advised   - MA Screening Digital Bilateral; Future  Advised     Hypothyroidism, unspecified type  Pending   - TSH         Return in about 2 months (around 10/20/2020) for Physical Exam.    Opal Goff MD  [facility]

## 2020-08-20 NOTE — LETTER
August 21, 2020    Gary Hinkle  [address] [place] [address]    Dear ,    We are writing to inform you of your test results.    Thyroid is normal         Resulted Orders   Lipid panel reflex to direct LDL Fasting   Result Value Ref Range    Cholesterol 168 <200 mg/dL    Triglycerides 219 (H) <150 mg/dL      Comment:      Borderline high:  150-199 mg/dl  High:             200-499 mg/dl  Very high:       >499 mg/dl  Fasting specimen      HDL Cholesterol 45 (L) >49 mg/dL    LDL Cholesterol Calculated 79 <100 mg/dL      Comment:      Desirable:       <100 mg/dl    Non HDL Cholesterol 123 <130 mg/dL   Glucose   Result Value Ref Range    Glucose 99 70 - 99 mg/dL      Comment:      Fasting specimen   TSH with free T4 reflex   Result Value Ref Range    TSH 3.62 0.40 - 4.00 mU/L       If you have any questions or concerns, please call the clinic at the number listed above.       Sincerely,        Opal Goff MD/sara

## 2020-09-24 ENCOUNTER — ANCILLARY PROCEDURE (OUTPATIENT)
Dept: MAMMOGRAPHY | Facility: CLINIC | Age: 60
End: 2020-09-24
Attending: FAMILY MEDICINE
Payer: COMMERCIAL

## 2020-09-24 DIAGNOSIS — Z12.31 VISIT FOR SCREENING MAMMOGRAM: ICD-10-CM

## 2020-09-24 PROCEDURE — 77067 SCR MAMMO BI INCL CAD: CPT | Mod: TC

## 2021-04-24 DIAGNOSIS — M25.562 PAIN AND SWELLING OF LEFT KNEE: ICD-10-CM

## 2021-04-24 DIAGNOSIS — M25.462 PAIN AND SWELLING OF LEFT KNEE: ICD-10-CM

## 2021-04-26 RX ORDER — NAPROXEN 500 MG/1
TABLET ORAL
Qty: 60 TABLET | Refills: 0 | Status: SHIPPED | OUTPATIENT
Start: 2021-04-26 | End: 2022-04-13

## 2021-04-26 NOTE — TELEPHONE ENCOUNTER
Routing refill request to provider for review/approval because:  Labs not current          Pending Prescriptions:                       Disp   Refills    naproxen (NAPROSYN) 500 MG tablet [Pharmac*60 tab*0        Sig: Take 1 tablet (500 mg) by mouth 2 times daily as           needed for moderate pain Take with food and a           full glass of water        Dave Schultz RN

## 2021-06-02 DIAGNOSIS — R21 RASH: Primary | ICD-10-CM

## 2021-06-04 RX ORDER — CLOBETASOL PROPIONATE 0.5 MG/G
OINTMENT TOPICAL
Qty: 15 G | Refills: 0 | Status: SHIPPED | OUTPATIENT
Start: 2021-06-04 | End: 2021-10-06

## 2021-08-17 DIAGNOSIS — E78.5 HYPERLIPIDEMIA LDL GOAL <130: ICD-10-CM

## 2021-08-19 RX ORDER — SIMVASTATIN 10 MG
10 TABLET ORAL AT BEDTIME
Qty: 90 TABLET | Refills: 0 | Status: SHIPPED | OUTPATIENT
Start: 2021-08-19 | End: 2021-10-06

## 2021-10-04 ENCOUNTER — NURSE TRIAGE (OUTPATIENT)
Dept: NURSING | Facility: CLINIC | Age: 61
End: 2021-10-04

## 2021-10-04 NOTE — TELEPHONE ENCOUNTER
Patient woke up and can  hear out of right ear and patient is having numbness around right ear.  Denies ear pain.  Ear is currently itching.    Hardikei gives consent for son to speak with FNA.  Denies numbness anywhere else on the body.  Symptoms started last night around 9pm.  Hearing is slightly impaired due to numbness.  Denies fever cough and shortness of breath.  Patient is requesting an in person clinic appointment.    COVID 19 Nurse Triage Plan/Patient Instructions    Please be aware that novel coronavirus (COVID-19) may be circulating in the community. If you develop symptoms such as fever, cough, or SOB or if you have concerns about the presence of another infection including coronavirus (COVID-19), please contact your health care provider or visit [email or web address].     Disposition/Instructions    In-Person Visit with provider recommended. Reference Visit Selection Guide.    Thank you for taking steps to prevent the spread of this virus.  o Limit your contact with others.  o Wear a simple mask to cover your cough.  o Wash your hands well and often.    Resources    [facility]: About COVID-19: [email or web address]    CDC: What to Do If You're Sick: www.cdc.gov/coronavirus/2019-ncov/about/steps-when-sick.html    CDC: Ending Home Isolation: www.cdc.gov/coronavirus/2019-ncov/hcp/disposition-in-home-patients.html     CDC: Caring for Someone: www.cdc.gov/coronavirus/2019-ncov/if-you-are-sick/care-for-someone.html     [facility]: Interim Guidance for Hospital Discharge to Home: www.health.[facility].[place].us/diseases/coronavirus/hcp/hospdischarge.pdf    [facility] clinical trials (COVID-19 research studies): clinicalaffairs.[facility].edu/um-clinical-trials     Below are the COVID-19 hotlines at the [place] Department of Health ([facility]). Interpreters are available.   o For health questions: Call 407-419-2075 or 1-317.450.2223 (7 a.m. to 7 p.m.)  o For questions about schools and childcare: Call  861.842.6442 or 1-226.353.6569 (7 a.m. to 7 p.m.)                       Reason for Disposition    Patient wants to be seen    Additional Information    Negative: Patient sounds very sick or weak to the triager    Negative: Ringing in the ears (tinnitus) and taking aspirin and dosage sounds high (i.e., > 1500 mg/day)    Negative: Hearing loss in one or both ears of sudden onset and present now    Negative: Earache    Negative: Decreased hearing followed sudden, extremely loud noise (e.g., explosion, not just loud concert)    Negative: Decreased hearing and taking medication that can damage hearing (i.e., gentamycin, tobramycin, furosemide, ethacrynic acid, cisplatin, quinidine)    Protocols used: HEARING LOSS OR CHANGE-A-OH

## 2021-10-06 ENCOUNTER — OFFICE VISIT (OUTPATIENT)
Dept: FAMILY MEDICINE | Facility: CLINIC | Age: 61
End: 2021-10-06
Payer: COMMERCIAL

## 2021-10-06 VITALS
WEIGHT: 174.38 LBS | HEART RATE: 67 BPM | HEIGHT: 63 IN | BODY MASS INDEX: 30.9 KG/M2 | SYSTOLIC BLOOD PRESSURE: 127 MMHG | TEMPERATURE: 97.4 F | DIASTOLIC BLOOD PRESSURE: 81 MMHG

## 2021-10-06 DIAGNOSIS — H93.11 TINNITUS, RIGHT: ICD-10-CM

## 2021-10-06 DIAGNOSIS — E78.5 HYPERLIPIDEMIA LDL GOAL <130: ICD-10-CM

## 2021-10-06 DIAGNOSIS — R21 RASH: ICD-10-CM

## 2021-10-06 DIAGNOSIS — Z13.1 SCREENING FOR DIABETES MELLITUS: ICD-10-CM

## 2021-10-06 DIAGNOSIS — J34.89 NOSE IRRITATION: ICD-10-CM

## 2021-10-06 DIAGNOSIS — H69.91 DYSFUNCTION OF RIGHT EUSTACHIAN TUBE: Primary | ICD-10-CM

## 2021-10-06 LAB — HBA1C MFR BLD: 5.8 % (ref 0–5.6)

## 2021-10-06 PROCEDURE — 36415 COLL VENOUS BLD VENIPUNCTURE: CPT | Performed by: FAMILY MEDICINE

## 2021-10-06 PROCEDURE — 83036 HEMOGLOBIN GLYCOSYLATED A1C: CPT | Performed by: FAMILY MEDICINE

## 2021-10-06 PROCEDURE — 80053 COMPREHEN METABOLIC PANEL: CPT | Performed by: FAMILY MEDICINE

## 2021-10-06 PROCEDURE — 99214 OFFICE O/P EST MOD 30 MIN: CPT | Performed by: FAMILY MEDICINE

## 2021-10-06 PROCEDURE — 80061 LIPID PANEL: CPT | Performed by: FAMILY MEDICINE

## 2021-10-06 RX ORDER — CLOBETASOL PROPIONATE 0.5 MG/G
OINTMENT TOPICAL
Qty: 15 G | Refills: 0 | Status: SHIPPED | OUTPATIENT
Start: 2021-10-06 | End: 2023-04-03

## 2021-10-06 RX ORDER — SIMVASTATIN 10 MG
10 TABLET ORAL AT BEDTIME
Qty: 90 TABLET | Refills: 3 | Status: SHIPPED | OUTPATIENT
Start: 2021-10-06 | End: 2022-11-28

## 2021-10-06 RX ORDER — PSEUDOEPHEDRINE HCL 120 MG/1
120 TABLET, FILM COATED, EXTENDED RELEASE ORAL EVERY 12 HOURS PRN
Qty: 20 TABLET | Refills: 0 | Status: SHIPPED | OUTPATIENT
Start: 2021-10-06 | End: 2022-02-01

## 2021-10-06 ASSESSMENT — MIFFLIN-ST. JEOR: SCORE: 1317.15

## 2021-10-06 NOTE — PATIENT INSTRUCTIONS
Use sudafed / pseudoephedrine as needed for the ear symptoms    If not improving then see the ear nose throat ENT specialist     Use plain saline nasal spray as needed for    Stay on simvastatin    Refilled clobetasol    We will send you lab results

## 2021-10-06 NOTE — PROGRESS NOTES
"         Kori Vega is a 60 year old who presents for the following health issues    HPI     Right ear hearing problem since Sunday night.  Feels like it is muffled and blocked and feels numb       Review of Systems   4 days of symptoms     Slightly better    Still ringing    No trauma    Comes and goes    Overall the hearing is okay much of the time    No fever/ chills / cough    No drainage down back of throat    Never had this before    Left ear fine    Sleeping okay    Not working    No nosebleeds    Takes some ibuprofen for the knee       Objective    /81 (BP Location: Right arm, Patient Position: Chair, Cuff Size: Adult Regular)   Pulse 67   Temp 97.4  F (36.3  C) (Temporal)   Ht 1.588 m (5' 2.5\")   Wt 79.1 kg (174 lb 6 oz)   Breastfeeding No   BMI 31.39 kg/m    Body mass index is 31.39 kg/m .  Physical Exam    Full physical not done     Mentation and affect are fine    No tremor of speech or extremity    Tympanic membranes and canals both look okay  Symmetric appearance  No obvious infection    No wax at all     Nasal and oral mucosa normal            ASSESSMENT / PLAN:  (H69.81) Dysfunction of right eustachian tube  (primary encounter diagnosis)  Comment: trial of pseudophedrine prn.  Warned of side effects.  If symptoms not resolving then see ENT.  Did referral.   Plan: pseudoePHEDrine (SUDAFED) 120 MG 12 hr tablet,         Otolaryngology Referral             (H93.11) Tinnitus, right  Comment: as above   Plan: Otolaryngology Referral              (J34.89) Nose irritation  Comment: use plain saline nasal spray   Plan:  Follow up prn     (E78.5) Hyperlipidemia LDL goal <130  Comment: needs refill of simv.   Overdue for labs.  Only had tofu soup today   Plan: simvastatin (ZOCOR) 10 MG tablet, Comprehensive        metabolic panel, Lipid panel reflex to direct         LDL Non-fasting             (R21) Rash  Comment: needs refill   Plan: clobetasol (TEMOVATE) 0.05 % external ointment         "     (Z13.1) Screening for diabetes mellitus  Comment: check for diabetes   Plan: Hemoglobin A1c               I reviewed the patient's medications, allergies, medical history, family history, and social history.    Ajay Lees MD

## 2021-10-06 NOTE — LETTER
"October 8, 2021      Gary Hinkle  [address] [place] [address]  [place] [address]        Dear ,    We are writing to inform you of your test results.    Cholesterol is okay.  Stay on simvastatin.     Hemoglobin a1c is in the \"prediabetes\" range.  Keep working on healthy diet/exercise.     Other labs are fine.         Resulted Orders   Hemoglobin A1c   Result Value Ref Range    Hemoglobin A1C 5.8 (H) 0.0 - 5.6 %      Comment:      Normal <5.7%   Prediabetes 5.7-6.4%    Diabetes 6.5% or higher     Note: Adopted from ADA consensus guidelines.   Lipid panel reflex to direct LDL Non-fasting   Result Value Ref Range    Cholesterol 194 <200 mg/dL    Triglycerides 187 (H) <150 mg/dL    Direct Measure HDL 49 (L) >=50 mg/dL    LDL Cholesterol Calculated 108 (H) <=100 mg/dL    Non HDL Cholesterol 145 (H) <130 mg/dL    Patient Fasting > 8hrs? No     Narrative    Cholesterol  Desirable:  <200 mg/dL    Triglycerides  Normal:  Less than 150 mg/dL  Borderline High:  150-199 mg/dL  High:  200-499 mg/dL  Very High:  Greater than or equal to 500 mg/dL    Direct Measure HDL  Female:  Greater than or equal to 50 mg/dL   Male:  Greater than or equal to 40 mg/dL    LDL Cholesterol  Desirable:  <100mg/dL  Above Desirable:  100-129 mg/dL   Borderline High:  130-159 mg/dL   High:  160-189 mg/dL   Very High:  >= 190 mg/dL    Non HDL Cholesterol  Desirable:  130 mg/dL  Above Desirable:  130-159 mg/dL  Borderline High:  160-189 mg/dL  High:  190-219 mg/dL  Very High:  Greater than or equal to 220 mg/dL   Comprehensive metabolic panel   Result Value Ref Range    Sodium 138 133 - 144 mmol/L    Potassium 4.0 3.4 - 5.3 mmol/L    Chloride 106 94 - 109 mmol/L    Carbon Dioxide (CO2) 28 20 - 32 mmol/L    Anion Gap 4 3 - 14 mmol/L    Urea Nitrogen 15 7 - 30 mg/dL    Creatinine 0.64 0.52 - 1.04 mg/dL    Calcium 9.3 8.5 - 10.1 mg/dL    Glucose 102 (H) 70 - 99 mg/dL    Alkaline Phosphatase 104 40 - 150 U/L    AST 20 0 - 45 U/L    ALT 36 0 - 50 U/L    Protein " Total 7.8 6.8 - 8.8 g/dL    Albumin 4.1 3.4 - 5.0 g/dL    Bilirubin Total 0.5 0.2 - 1.3 mg/dL    GFR Estimate >90 >60 mL/min/1.73m2      Comment:      As of July 11, 2021, eGFR is calculated by the CKD-EPI creatinine equation, without race adjustment. eGFR can be influenced by muscle mass, exercise, and diet. The reported eGFR is an estimation only and is only applicable if the renal function is stable.       If you have any questions or concerns, please call the clinic at the number listed above.       Sincerely,      Ajay Lees MD/ruiz

## 2021-10-07 LAB
ALBUMIN SERPL-MCNC: 4.1 G/DL (ref 3.4–5)
ALP SERPL-CCNC: 104 U/L (ref 40–150)
ALT SERPL W P-5'-P-CCNC: 36 U/L (ref 0–50)
ANION GAP SERPL CALCULATED.3IONS-SCNC: 4 MMOL/L (ref 3–14)
AST SERPL W P-5'-P-CCNC: 20 U/L (ref 0–45)
BILIRUB SERPL-MCNC: 0.5 MG/DL (ref 0.2–1.3)
BUN SERPL-MCNC: 15 MG/DL (ref 7–30)
CALCIUM SERPL-MCNC: 9.3 MG/DL (ref 8.5–10.1)
CHLORIDE BLD-SCNC: 106 MMOL/L (ref 94–109)
CHOLEST SERPL-MCNC: 194 MG/DL
CO2 SERPL-SCNC: 28 MMOL/L (ref 20–32)
CREAT SERPL-MCNC: 0.64 MG/DL (ref 0.52–1.04)
FASTING STATUS PATIENT QL REPORTED: NO
GFR SERPL CREATININE-BSD FRML MDRD: >90 ML/MIN/1.73M2
GLUCOSE BLD-MCNC: 102 MG/DL (ref 70–99)
HDLC SERPL-MCNC: 49 MG/DL
LDLC SERPL CALC-MCNC: 108 MG/DL
NONHDLC SERPL-MCNC: 145 MG/DL
POTASSIUM BLD-SCNC: 4 MMOL/L (ref 3.4–5.3)
PROT SERPL-MCNC: 7.8 G/DL (ref 6.8–8.8)
SODIUM SERPL-SCNC: 138 MMOL/L (ref 133–144)
TRIGL SERPL-MCNC: 187 MG/DL

## 2021-10-08 NOTE — RESULT ENCOUNTER NOTE
"Cholesterol is okay.  Stay on simvastatin.    Hemoglobin a1c is in the \"prediabetes\" range.  Keep working on healthy diet/exercise.    Other labs are fine.    Ajay Lees MD  "

## 2022-02-01 ENCOUNTER — OFFICE VISIT (OUTPATIENT)
Dept: INTERNAL MEDICINE | Facility: CLINIC | Age: 62
End: 2022-02-01
Payer: COMMERCIAL

## 2022-02-01 VITALS
WEIGHT: 171 LBS | OXYGEN SATURATION: 95 % | TEMPERATURE: 98.3 F | HEIGHT: 62 IN | BODY MASS INDEX: 31.47 KG/M2 | SYSTOLIC BLOOD PRESSURE: 117 MMHG | HEART RATE: 69 BPM | DIASTOLIC BLOOD PRESSURE: 76 MMHG

## 2022-02-01 DIAGNOSIS — H69.91 DYSFUNCTION OF RIGHT EUSTACHIAN TUBE: ICD-10-CM

## 2022-02-01 DIAGNOSIS — M17.11 PRIMARY OSTEOARTHRITIS OF RIGHT KNEE: Primary | ICD-10-CM

## 2022-02-01 DIAGNOSIS — E03.9 ACQUIRED HYPOTHYROIDISM: ICD-10-CM

## 2022-02-01 LAB — TSH SERPL DL<=0.005 MIU/L-ACNC: 2.88 MU/L (ref 0.4–4)

## 2022-02-01 PROCEDURE — 36415 COLL VENOUS BLD VENIPUNCTURE: CPT | Performed by: INTERNAL MEDICINE

## 2022-02-01 PROCEDURE — 99203 OFFICE O/P NEW LOW 30 MIN: CPT | Performed by: INTERNAL MEDICINE

## 2022-02-01 PROCEDURE — 84443 ASSAY THYROID STIM HORMONE: CPT | Performed by: INTERNAL MEDICINE

## 2022-02-01 RX ORDER — PSEUDOEPHEDRINE HCL 120 MG/1
120 TABLET, FILM COATED, EXTENDED RELEASE ORAL EVERY 12 HOURS PRN
Qty: 20 TABLET | Refills: 0 | Status: SHIPPED | OUTPATIENT
Start: 2022-02-01 | End: 2022-04-13

## 2022-02-01 RX ORDER — LEVOTHYROXINE, LIOTHYRONINE 38; 9 UG/1; UG/1
60 TABLET ORAL DAILY
Qty: 90 TABLET | Refills: 1 | Status: SHIPPED | OUTPATIENT
Start: 2022-02-01 | End: 2022-09-12

## 2022-02-01 ASSESSMENT — MIFFLIN-ST. JEOR: SCORE: 1292.87

## 2022-02-01 NOTE — LETTER
February 1, 2022      Gary Hinkle  [address] [place] [address]  [facility] [place] [address]        Dear Gary:    We are writing to inform you of your test results.    Thyroid is normal:  No dose change needed.     Resulted Orders   TSH with free T4 reflex   Result Value Ref Range    TSH 2.88 0.40 - 4.00 mU/L     If you have any questions or concerns, please call the clinic at the number listed above.     Sincerely,      Cindy Chance MD/tg

## 2022-02-01 NOTE — PROGRESS NOTES
"  Assessment & Plan     Primary osteoarthritis of right knee   Xray and ER visit reviewed from a few months ago.   She has failed Phys Therapy and injections per history  She is agreeable to seeing orthopedics.    - Orthopedic  Referral; Future    Acquired hypothyroidism   due for lab  Rx refilled.    - TSH with free T4 reflex; Future  - NP THYROID 60 MG tablet; Take 1 tablet (60 mg) by mouth daily Take 1 tablet daily  - TSH with free T4 reflex    Dysfunction of right eustachian tube   needs refill   - pseudoePHEDrine (SUDAFED) 120 MG 12 hr tablet; Take 1 tablet (120 mg) by mouth every 12 hours as needed for congestion (or eustacian tube dysfunction)      I spent a total of 30 minutes on the day of the visit.   Time spent doing chart review, history and exam, documentation and further activities per the note        BMI:   Estimated body mass index is 31.03 kg/m  as calculated from the following:    Height as of this encounter: 1.581 m (5' 2.25\").    Weight as of this encounter: 77.6 kg (171 lb).            Return in about 3 months (around 5/1/2022) for Physical Exam.    Cindy Chance MD  [facility] [place] is a 61 year old who presents for the following health issues  accompanied by her spouse.Using Mandarin telephone .      h/o djd knee, Hyperlipidemia and hypothyroid.    Knee is limiting all activities.  Feels unstable.  Painful when straight.    Phys  Therapy failed.    Injection failed  She is ready for orthopedic referral.     She has been taking thyroid med daily, needs refill and due for lab recheck.      Wants refill sudafed for noc cough.  Was tested for covid and was negative recently (\"a couple days ago\") - was at an outside clinic      HPI     Medication Followup of Thyroid med     Taking Medication as prescribed: yes    Side Effects:  None    Medication Helping Symptoms:  yes     Concern - Right knee pain   Onset: 10 " "years  Description: pain  Intensity: severe  Progression of Symptoms:  same  Accompanying Signs & Symptoms: hard to stand for long periods of time  Previous history of similar problem: yes  Precipitating factors:        Worsened by: standing  Alleviating factors:        Improved by: nothing   Therapies tried and outcome:  none       Review of Systems   Constitutional, HEENT, cardiovascular, pulmonary, gi and gu systems are negative, except as otherwise noted.      Objective    /76 (BP Location: Right arm, Patient Position: Sitting, Cuff Size: Adult Regular)   Pulse 69   Temp 98.3  F (36.8  C) (Oral)   Ht 1.581 m (5' 2.25\")   Wt 77.6 kg (171 lb)   SpO2 95%   BMI 31.03 kg/m    Body mass index is 31.03 kg/m .  Physical Exam   GENERAL: healthy, alert and no distress  EYES: Eyes grossly normal to inspection, PERRL and conjunctivae and sclerae normal  MS: no gross musculoskeletal defects noted, no edema  MS: dejenerative joint arthritis changes at RIGHT knee, normal ROM but pain with 180degrees.  No effusion.   SKIN: no suspicious lesions or rashes  NEURO: Normal strength and tone, mentation intact and speech normal  PSYCH: mentation appears normal, affect normal/bright    No results found for this or any previous visit (from the past 24 hour(s)).  TSH is pending.           "

## 2022-02-02 NOTE — PROGRESS NOTES
"CHIEF COMPLAINT:   Chief Complaint   Patient presents with     Knee Pain     R knee pain for 10+ years. Describes the pain as a shooting burning pain. Had injection 12 yrs ago in [place].      Gary Hinkle is seen today in the [facility] for evaluation of right knee pain at the request of Dr. Cindy Chance     Today's encounter utilized a language specific  (#[id number] via phone today to obtain the HPI, PAST MEDICAL/SURGICAL history, social history, family history, medications and allergies and review of systems; in addition to perform physical examination; review pertinent imaging studies; discuss exam findings and assessment; and go over treatment plan.        HISTORY OF PRESENT ILLNESS    Gary Hinkle is a 61 year old female seen for evaluation of ongoing right knee pain with no known injury.   Pain has been present for more than 10 years. Locates pain across the front of the knee and long the inner/outer aspects, aggravated with weight bearing activities such as walking, standing, twisting, stairs. Pain is a \"shooting and burning\" pain. Ok at rest unless moves around. Positive night pain.    Reports injection 12 years ago in [place], nothing recently.    Does have low back pain, 40+ years.        Present symptoms: pain medially , laterally and anteriorly, pain shooting, burning , moderate pain.    Pain severity: 7/10  Frequency of symptoms: are constant  Exacerbating Factors: weight bearing, stairs, [id number], prolonged standing  Relieving Factors: rest, sitting  Night Pain: Yes  Pain while at rest: No   Numbness or tingling: left thigh/knee area seems numb   Patient has tried:     NSAIDS: Yes , in the past but not recently.     Physical Therapy: No      Activity modification: No      Bracing: No      Injections: 12 years ago in [place] , nothing recently.     Ice: No      Assistive device:  No     Other: tylenol.      Other PMH:  has a past medical history of " Hyperlipidemia LDL goal < 130 and Hypothyroidism.  Patient Active Problem List   Diagnosis     Hyperlipidemia LDL goal <130     HDL deficiency     Hypothyroidism     Lichen sclerosus of female genitalia     Constipation       Surgical Hx:  has a past surgical history that includes appendectomy.    Medications:   Current Outpatient Medications:      clobetasol (TEMOVATE) 0.05 % external ointment, Apply thin layer to effected area twice daily (Patient not taking: Reported on 2/1/2022), Disp: 15 g, Rfl: 0     diclofenac (VOLTAREN) 1 % topical gel, Apply 4 times daily (Patient not taking: Reported on 2/1/2022), Disp: , Rfl:      naproxen (NAPROSYN) 500 MG tablet, Take 1 tablet (500 mg) by mouth 2 times daily as needed for moderate pain Take with food and a full glass of water (Patient not taking: Reported on 2/1/2022), Disp: 60 tablet, Rfl: 0     NP THYROID 60 MG tablet, Take 1 tablet (60 mg) by mouth daily Take 1 tablet daily, Disp: 90 tablet, Rfl: 1     pseudoePHEDrine (SUDAFED) 120 MG 12 hr tablet, Take 1 tablet (120 mg) by mouth every 12 hours as needed for congestion (or eustacian tube dysfunction), Disp: 20 tablet, Rfl: 0     simvastatin (ZOCOR) 10 MG tablet, Take 1 tablet (10 mg) by mouth At Bedtime, Disp: 90 tablet, Rfl: 3     thyroid (ARMOUR THYROID) 60 MG tablet, daily, Disp: , Rfl:      triamcinolone (KENALOG) 0.1 % ointment, Apply sparingly to affected area three times daily for 14 days. (Patient not taking: Reported on 1/4/2019), Disp: 15 g, Rfl: 0    Allergies: No Known Allergies    Social Hx:   reports that she has never smoked. She has never used smokeless tobacco. She reports that she does not drink alcohol and does not use drugs.    Family Hx: family history includes C.A.D. in her mother.    REVIEW OF SYSTEMS: 10 point ROS neg other than the symptoms noted above in the HPI and PMH. Notables include  CONSTITUTIONAL:NEGATIVE for fever, chills, change in weight  INTEGUMENTARY/SKIN: NEGATIVE for worrisome  "rashes, moles or lesions  MUSCULOSKELETAL:See HPI above  NEURO: NEGATIVE for weakness, dizziness or paresthesias    PHYSICAL EXAM:  /77   Pulse 89   Ht 1.575 m (5' 2\")   Wt 76.9 kg (169 lb 9.6 oz)   SpO2 98%   BMI 31.02 kg/m     GENERAL APPEARANCE: healthy, alert, no distress; accompanied by her ;  via phone.  SKIN: no suspicious lesions or rashes  NEURO: Normal strength and tone, mentation intact and speech normal  PSYCH:  mentation appears normal and affect normal, not anxious  RESPIRATORY: No increased work of breathing.  HANDS: no clubbing, nail pitting  LYMPH: no palpable popliteal lymphadenopathy.    BILATERAL LOWER EXTREMITIES:  Gait: mildly favors the right.  Alignment: varus  No gross deformities or masses.  Bilateral  Quad atrophy, strength weak  Intact sensation deep peroneal nerve, superficial peroneal nerve, med/lat tibial nerve, sural nerve, saphenous nerve  Intact EHL, EDL, TA, FHL, GS, quadriceps hamstrings and hip flexors  Toes warm and well perfused, brisk capillary refill. Palpable 2+ dp pulses.  Bilateral calf soft and nttp or squeeze.  DTRs: achilles 2+, patella 2+.  Edema: trace    LEFT KNEE EXAM:    Skin: intact, no ecchymosis or erythema  ROM: near full extension to 125 flexion  Tight hamstrings on straight leg raise.  Effusion: none  Tender: medial and lateral joint line, pes    MCL: stable, and non-painful at both 0 and 30 degrees knee flexion  Varus stress: stable, and non-painful at both 0 and 30 degrees knee flexion  Lachmans: neg, firm endpoint  Posterior Drawer stable  Patellofemoral joint:                Apprehension: negative              Crepitations: moderate-severe   Grind: positive.    RIGHT KNEE EXAM:    Skin: intact, no ecchymosis or erythema  ROM: near full extension to 120+ flexion, anterior discomfort with flexion  Tight hamstrings on straight leg raise.  Effusion: trace  Tender: diffuse anterior knee, medial and lateral joint line, pes  MCL: " "stable, and non-painful at both 0 and 30 degrees knee flexion  Varus stress: stable, and non-painful at both 0 and 30 degrees knee flexion  Lachmans: neg, firm endpoint  Posterior Drawer stable  Patellofemoral joint:                Apprehension: negative              Crepitations: moderate   Grind: positive.      X-RAY:  3 views right knee 2/4/2022  were reviewed in clinic today. On my review, no obvious fractures or dislocations. There is advanced patello-femoral degenerative changes with marginal osteophytes. Mild medial and lateral narrowing. Small medial and lateral marginal osteophytes. Osteopenia.           ASSESSMENT/PLAN: Gary Hinkle is a 61 year old female with chronic right knee pain, primary osteoarthritis.     * reviewed imaging studies with patient, showing arthritic changes, or wearing of the cartilage in the knee. This can be caused by normal \"wear and tear\" over the years or following prior injury to the knee.    Treatment typically starts nonsurgically. Surgical indication for total knee arthroplasty  when nonsurgical management is no longer effective.    At this time, she's not overly interested in surgery, doesn't feel quite ready.    Non-surgical treatment for knee arthritis includes:    * rest, sitting  * Activity modification - avoid impact activities or activities that aggravate symptoms.  * NSAIDS (non-steroidal anti-inflammatory medications; e.g. Aleve, advil, motrin, ibuprofen) - regular use for inflammation ( twice daily or three times daily), with food, as long as no contra-indications Please discuss with primary care doctor if needed  * ice, 15-20 minutes at a time several times a day or as needed.  * Strengthening of quadriceps muscles  * Physical Therapy for strengthening, stretching and range of motion exercises of legs  * Tylenol as needed for pain, consider Tylenol arthritis or similar  * Weight loss: Weight loss:  Body mass index is 31.02 kg/m .. weight loss benefits, not only for " "the current pain symptoms, but also overall health. Recommend a good diet plan that works for the patient, with the assistance of a dietician or primary care doctor as needed. Also, a good, low-impact exercise program for at least 20 minutes per day, 3 times per week, such as exercise bike, elliptical , or pool.  * Exercise: low impact such as stationary bike, elliptical, pool.  * Injections: cortisone versus viscosupplementation (hyaluronic acid, \"rooster comb\", \"gel shots\"); risks and perceived benefits discussed today. Patient elects NOT to proceed.  * Bracing: bracing the knee may offer some relief of symptoms when worn and provide some stability.  * over the counter supplements such as glucosamine and chondroitin sulfate may help with joint pain.  * topical ointments may help as well    * return to clinic as needed.          Shaka De Luna M.D., M.S.  Dept. of Orthopaedic Surgery  [facility]      "

## 2022-02-04 ENCOUNTER — OFFICE VISIT (OUTPATIENT)
Dept: ORTHOPEDICS | Facility: CLINIC | Age: 62
End: 2022-02-04
Attending: INTERNAL MEDICINE
Payer: COMMERCIAL

## 2022-02-04 ENCOUNTER — ANCILLARY PROCEDURE (OUTPATIENT)
Dept: GENERAL RADIOLOGY | Facility: CLINIC | Age: 62
End: 2022-02-04
Attending: ORTHOPAEDIC SURGERY
Payer: COMMERCIAL

## 2022-02-04 VITALS
OXYGEN SATURATION: 98 % | WEIGHT: 169.6 LBS | DIASTOLIC BLOOD PRESSURE: 77 MMHG | SYSTOLIC BLOOD PRESSURE: 134 MMHG | HEART RATE: 89 BPM | HEIGHT: 62 IN | BODY MASS INDEX: 31.21 KG/M2

## 2022-02-04 DIAGNOSIS — M25.561 CHRONIC PAIN OF RIGHT KNEE: ICD-10-CM

## 2022-02-04 DIAGNOSIS — G89.29 CHRONIC PAIN OF RIGHT KNEE: ICD-10-CM

## 2022-02-04 DIAGNOSIS — M17.11 PRIMARY OSTEOARTHRITIS OF RIGHT KNEE: Primary | ICD-10-CM

## 2022-02-04 DIAGNOSIS — M17.11 PRIMARY OSTEOARTHRITIS OF RIGHT KNEE: ICD-10-CM

## 2022-02-04 PROCEDURE — 99244 OFF/OP CNSLTJ NEW/EST MOD 40: CPT | Performed by: ORTHOPAEDIC SURGERY

## 2022-02-04 PROCEDURE — 73562 X-RAY EXAM OF KNEE 3: CPT | Mod: RT | Performed by: RADIOLOGY

## 2022-02-04 ASSESSMENT — MIFFLIN-ST. JEOR: SCORE: 1282.55

## 2022-02-04 ASSESSMENT — PAIN SCALES - GENERAL: PAINLEVEL: SEVERE PAIN (7)

## 2022-02-04 NOTE — LETTER
"    2/4/2022         RE: Gary Hinkle  [address] [place] [id number]        Dear Colleague,    Thank you for referring your patient, Gary Hinkle, to the [facility]. Please see a copy of my visit note below.    CHIEF COMPLAINT:   Chief Complaint   Patient presents with     Knee Pain     R knee pain for 10+ years. Describes the pain as a shooting burning pain. Had injection 12 yrs ago in [place].      Gary Hinkle is seen today in the [facility] for evaluation of right knee pain at the request of Dr. Cindy Chance     Today's encounter utilized a language specific  (#[id number]?) via phone today to obtain the HPI, PAST MEDICAL/SURGICAL history, social history, family history, medications and allergies and review of systems; in addition to perform physical examination; review pertinent imaging studies; discuss exam findings and assessment; and go over treatment plan.        HISTORY OF PRESENT ILLNESS    Gary Hinkle is a 61 year old female seen for evaluation of ongoing right knee pain with no known injury.   Pain has been present for more than 10 years. Locates pain across the front of the knee and long the inner/outer aspects, aggravated with weight bearing activities such as walking, standing, twisting, stairs. Pain is a \"shooting and burning\" pain. Ok at rest unless moves around. Positive night pain.    Reports injection 12 years ago in [place], nothing recently.    Does have low back pain, 40+ years.        Present symptoms: pain medially , laterally and anteriorly, pain shooting, burning , moderate pain.    Pain severity: 7/10  Frequency of symptoms: are constant  Exacerbating Factors: weight bearing, stairs, [id number], prolonged standing  Relieving Factors: rest, sitting  Night Pain: Yes  Pain while at rest: No   Numbness or tingling: left thigh/knee area seems numb   Patient has tried:     NSAIDS: Yes , in the past but not recently.     Physical " Therapy: No      Activity modification: No      Bracing: No      Injections: 12 years ago in [place] , nothing recently.     Ice: No      Assistive device:  No     Other: tylenol.      Other PMH:  has a past medical history of Hyperlipidemia LDL goal < 130 and Hypothyroidism.  Patient Active Problem List   Diagnosis     Hyperlipidemia LDL goal <130     HDL deficiency     Hypothyroidism     Lichen sclerosus of female genitalia     Constipation       Surgical Hx:  has a past surgical history that includes appendectomy.    Medications:   Current Outpatient Medications:      clobetasol (TEMOVATE) 0.05 % external ointment, Apply thin layer to effected area twice daily (Patient not taking: Reported on 2/1/2022), Disp: 15 g, Rfl: 0     diclofenac (VOLTAREN) 1 % topical gel, Apply 4 times daily (Patient not taking: Reported on 2/1/2022), Disp: , Rfl:      naproxen (NAPROSYN) 500 MG tablet, Take 1 tablet (500 mg) by mouth 2 times daily as needed for moderate pain Take with food and a full glass of water (Patient not taking: Reported on 2/1/2022), Disp: 60 tablet, Rfl: 0     NP THYROID 60 MG tablet, Take 1 tablet (60 mg) by mouth daily Take 1 tablet daily, Disp: 90 tablet, Rfl: 1     pseudoePHEDrine (SUDAFED) 120 MG 12 hr tablet, Take 1 tablet (120 mg) by mouth every 12 hours as needed for congestion (or eustacian tube dysfunction), Disp: 20 tablet, Rfl: 0     simvastatin (ZOCOR) 10 MG tablet, Take 1 tablet (10 mg) by mouth At Bedtime, Disp: 90 tablet, Rfl: 3     thyroid (ARMOUR THYROID) 60 MG tablet, daily, Disp: , Rfl:      triamcinolone (KENALOG) 0.1 % ointment, Apply sparingly to affected area three times daily for 14 days. (Patient not taking: Reported on 1/4/2019), Disp: 15 g, Rfl: 0    Allergies: No Known Allergies    Social Hx:   reports that she has never smoked. She has never used smokeless tobacco. She reports that she does not drink alcohol and does not use drugs.    Family Hx: family history includes C.A.D. in her  "mother.    REVIEW OF SYSTEMS: 10 point ROS neg other than the symptoms noted above in the HPI and PMH. Notables include  CONSTITUTIONAL:NEGATIVE for fever, chills, change in weight  INTEGUMENTARY/SKIN: NEGATIVE for worrisome rashes, moles or lesions  MUSCULOSKELETAL:See HPI above  NEURO: NEGATIVE for weakness, dizziness or paresthesias    PHYSICAL EXAM:  /77   Pulse 89   Ht 1.575 m (5' 2\")   Wt 76.9 kg (169 lb 9.6 oz)   SpO2 98%   BMI 31.02 kg/m     GENERAL APPEARANCE: healthy, alert, no distress; accompanied by her ;  via phone.  SKIN: no suspicious lesions or rashes  NEURO: Normal strength and tone, mentation intact and speech normal  PSYCH:  mentation appears normal and affect normal, not anxious  RESPIRATORY: No increased work of breathing.  HANDS: no clubbing, nail pitting  LYMPH: no palpable popliteal lymphadenopathy.    BILATERAL LOWER EXTREMITIES:  Gait: mildly favors the right.  Alignment: varus  No gross deformities or masses.  Bilateral  Quad atrophy, strength weak  Intact sensation deep peroneal nerve, superficial peroneal nerve, med/lat tibial nerve, sural nerve, saphenous nerve  Intact EHL, EDL, TA, FHL, GS, quadriceps hamstrings and hip flexors  Toes warm and well perfused, brisk capillary refill. Palpable 2+ dp pulses.  Bilateral calf soft and nttp or squeeze.  DTRs: achilles 2+, patella 2+.  Edema: trace    LEFT KNEE EXAM:    Skin: intact, no ecchymosis or erythema  ROM: near full extension to 125 flexion  Tight hamstrings on straight leg raise.  Effusion: none  Tender: medial and lateral joint line, pes    MCL: stable, and non-painful at both 0 and 30 degrees knee flexion  Varus stress: stable, and non-painful at both 0 and 30 degrees knee flexion  Lachmans: neg, firm endpoint  Posterior Drawer stable  Patellofemoral joint:                Apprehension: negative              Crepitations: moderate-severe   Grind: positive.    RIGHT KNEE EXAM:    Skin: intact, no " "ecchymosis or erythema  ROM: near full extension to 120+ flexion, anterior discomfort with flexion  Tight hamstrings on straight leg raise.  Effusion: trace  Tender: diffuse anterior knee, medial and lateral joint line, pes  MCL: stable, and non-painful at both 0 and 30 degrees knee flexion  Varus stress: stable, and non-painful at both 0 and 30 degrees knee flexion  Lachmans: neg, firm endpoint  Posterior Drawer stable  Patellofemoral joint:                Apprehension: negative              Crepitations: moderate   Grind: positive.      X-RAY:  3 views right knee 2/4/2022  were reviewed in clinic today. On my review, no obvious fractures or dislocations. There is advanced patello-femoral degenerative changes with marginal osteophytes. Mild medial and lateral narrowing. Small medial and lateral marginal osteophytes. Osteopenia.           ASSESSMENT/PLAN: Gary Hinkle is a 61 year old female with chronic right knee pain, primary osteoarthritis.     * reviewed imaging studies with patient, showing arthritic changes, or wearing of the cartilage in the knee. This can be caused by normal \"wear and tear\" over the years or following prior injury to the knee.    Treatment typically starts nonsurgically. Surgical indication for total knee arthroplasty  when nonsurgical management is no longer effective.    At this time, she's not overly interested in surgery, doesn't feel quite ready.    Non-surgical treatment for knee arthritis includes:    * rest, sitting  * Activity modification - avoid impact activities or activities that aggravate symptoms.  * NSAIDS (non-steroidal anti-inflammatory medications; e.g. Aleve, advil, motrin, ibuprofen) - regular use for inflammation ( twice daily or three times daily), with food, as long as no contra-indications Please discuss with primary care doctor if needed  * ice, 15-20 minutes at a time several times a day or as needed.  * Strengthening of quadriceps muscles  * Physical Therapy for " "strengthening, stretching and range of motion exercises of legs  * Tylenol as needed for pain, consider Tylenol arthritis or similar  * Weight loss: Weight loss:  Body mass index is 31.02 kg/m .. weight loss benefits, not only for the current pain symptoms, but also overall health. Recommend a good diet plan that works for the patient, with the assistance of a dietician or primary care doctor as needed. Also, a good, low-impact exercise program for at least 20 minutes per day, 3 times per week, such as exercise bike, elliptical , or pool.  * Exercise: low impact such as stationary bike, elliptical, pool.  * Injections: cortisone versus viscosupplementation (hyaluronic acid, \"rooster comb\", \"gel shots\"); risks and perceived benefits discussed today. Patient elects NOT to proceed.  * Bracing: bracing the knee may offer some relief of symptoms when worn and provide some stability.  * over the counter supplements such as glucosamine and chondroitin sulfate may help with joint pain.  * topical ointments may help as well    * return to clinic as needed.          Shaka De Luna M.D., M.S.  Dept. of Orthopaedic Surgery  [facility]          Again, thank you for allowing me to participate in the care of your patient.        Sincerely,        Shaka De Luna MD    "

## 2022-02-22 ENCOUNTER — THERAPY VISIT (OUTPATIENT)
Dept: PHYSICAL THERAPY | Facility: CLINIC | Age: 62
End: 2022-02-22
Attending: ORTHOPAEDIC SURGERY
Payer: COMMERCIAL

## 2022-02-22 DIAGNOSIS — G89.29 CHRONIC PAIN OF RIGHT KNEE: ICD-10-CM

## 2022-02-22 DIAGNOSIS — M17.11 PRIMARY OSTEOARTHRITIS OF RIGHT KNEE: ICD-10-CM

## 2022-02-22 DIAGNOSIS — M25.561 CHRONIC PAIN OF RIGHT KNEE: ICD-10-CM

## 2022-02-22 PROCEDURE — 97110 THERAPEUTIC EXERCISES: CPT | Mod: GP | Performed by: PHYSICAL THERAPIST

## 2022-02-22 PROCEDURE — 97161 PT EVAL LOW COMPLEX 20 MIN: CPT | Mod: GP | Performed by: PHYSICAL THERAPIST

## 2022-02-22 ASSESSMENT — ACTIVITIES OF DAILY LIVING (ADL)
KNEEL ON THE FRONT OF YOUR KNEE: I AM UNABLE TO DO THE ACTIVITY
LIMPING: THE SYMPTOM AFFECTS MY ACTIVITY SEVERELY
AS_A_RESULT_OF_YOUR_KNEE_INJURY,_HOW_WOULD_YOU_RATE_YOUR_CURRENT_LEVEL_OF_DAILY_ACTIVITY?: SEVERELY ABNORMAL
SQUAT: I AM UNABLE TO DO THE ACTIVITY
HOW_WOULD_YOU_RATE_THE_OVERALL_FUNCTION_OF_YOUR_KNEE_DURING_YOUR_USUAL_DAILY_ACTIVITIES?: SEVERELY ABNORMAL
RISE FROM A CHAIR: ACTIVITY IS SOMEWHAT DIFFICULT
GO UP STAIRS: I AM UNABLE TO DO THE ACTIVITY
SIT WITH YOUR KNEE BENT: ACTIVITY IS SOMEWHAT DIFFICULT
GIVING WAY, BUCKLING OR SHIFTING OF KNEE: THE SYMPTOM AFFECTS MY ACTIVITY SEVERELY
WEAKNESS: THE SYMPTOM AFFECTS MY ACTIVITY SEVERELY
SWELLING: THE SYMPTOM AFFECTS MY ACTIVITY SEVERELY
STAND: ACTIVITY IS VERY DIFFICULT
WALK: ACTIVITY IS VERY DIFFICULT
KNEE_ACTIVITY_OF_DAILY_LIVING_SCORE: 20
GO DOWN STAIRS: I AM UNABLE TO DO THE ACTIVITY
PAIN: THE SYMPTOM AFFECTS MY ACTIVITY SEVERELY
RAW_SCORE: 14
STIFFNESS: THE SYMPTOM AFFECTS MY ACTIVITY SEVERELY
KNEE_ACTIVITY_OF_DAILY_LIVING_SUM: 14

## 2022-02-22 NOTE — PROGRESS NOTES
Physical Therapy Initial Evaluation  Subjective:  The history is provided by the patient. The history is limited by a language barrier. A  was used.   Therapist Generated HPI Evaluation  Problem details: Has had knee pain for 15 years. No injury where pain started. Gets pain on front, back and sides of knee. Does have back pain and sometimes gets pain down the back of leg down to the foot. Stairs will make the pain worse. If stands or walks for 30 minutes will have increased pain. Moving leg will make pain less. .         Type of problem:  Right knee.      Condition occurred with:  Insidious onset.  Where condition occurred: for unknown reasons.  Patient reports pain:  Anterior, lateral, medial and posterior.  Pain is described as sharp and aching and is constant.  Pain is worse in the P.M..  Since onset symptoms are gradually worsening.  Associated symptoms:  Tingling and loss of motion/stiffness. Symptoms are exacerbated by ascending stairs, descending stairs, standing and walking  and relieved by activity/movement.  Special tests included:  X-ray.    Barriers include:  None as reported by patient.    Patient Health History  Gary Hinkle being seen for Right knee pain.     Problem began: 2/22/2007.   Problem occurred: Naturally   Pain is reported as 6/10 on pain scale.    Pertinent medical history includes: thyroid problems.   Red flags:  None as reported by patient.     Surgeries include:  Other. Other surgery history details: Removed appendix.    Current medications:  Thyroid medication and pain medication.                                           Objective:    Gait:  Reduced gait speed. Reduced stride length and heel strike.  Gait Type:  Antalgic                    Lumbar/SI Evaluation            Neural Tension/Mobility:      Left side:SLR; SLR w/DF or Slump  negative.     Right side:   SLR w/DF; Slump or SLR  negative.                                                  Knee Evaluation:  ROM:     AROM      Extension:  Left: 0    Right:  -10  Flexion: Left: 125    Right: 92        Strength:     Extension:  Left: 5/5   Pain:      Right: 4-/5   Pain:  Flexion:  Left: 5/5   Pain:      Right: 4-/5   Pain:      Quad Set Right: Fair    Pain:  Ligament Testing:    Varus 0:  Left:  Pos     Varus 30:  Left:  Pos    Valgus 0:  Left:  Pos    Valgus 30:  Left:  Pos      Anterior Drawer:  Left:  Neg      Posterior Drawer: Left:  Neg        Palpation:      Right knee tenderness present at:  Medial Joint Line; Lateral Joint Line and Patellar Tendon  Edema:  Normal            General     ROS    Assessment/Plan:    Patient is a 61 year old female with right side knee complaints.    Patient has the following significant findings with corresponding treatment plan.                Diagnosis 1:  Right knee pain  Pain -  manual therapy, splint/taping/bracing/orthotics, self management, education and home program  Decreased ROM/flexibility - manual therapy, therapeutic exercise, therapeutic activity and home program  Decreased joint mobility - manual therapy, therapeutic exercise, therapeutic activity and home program  Decreased strength - therapeutic exercise, therapeutic activities and home program  Impaired gait - gait training and assistive devices  Decreased function - therapeutic activities and home program    Therapy Evaluation Codes:     Cumulative Therapy Evaluation is: Low complexity.    Previous and current functional limitations:  (See Goal Flow Sheet for this information)    Short term and Long term goals: (See Goal Flow Sheet for this information)     Communication ability:  Patient appears to be able to clearly communicate and understand verbal and written communication and follow directions correctly.  Treatment Explanation - The following has been discussed with the patient:   RX ordered/plan of care  Anticipated outcomes  Possible risks and side effects  This patient would benefit from PT intervention to resume  normal activities.   Rehab potential is good.    Frequency:  1 X week, once daily  Duration:  for 8 weeks  Discharge Plan:  Achieve all LTG.  Independent in home treatment program.  Reach maximal therapeutic benefit.    Please refer to the daily flowsheet for treatment today, total treatment time and time spent performing 1:1 timed codes.

## 2022-02-22 NOTE — PROGRESS NOTES
[facility]    OUTPATIENT Physical Therapy ORTHOPEDIC EVALUATION  PLAN OF TREATMENT FOR OUTPATIENT REHABILITATION  (COMPLETE FOR INITIAL CLAIMS ONLY)  Patient's Last Name, First Name, M.I.  YOB: 1960  Gary Hinkle       Provider s Name:  [facility]   Medical Record No.  [id number]   Start of Care Date:  02/22/22   Onset Date:   02/04/22 (Date of Order)   Type:     _X__PT   ___OT Medical Diagnosis:    Encounter Diagnoses   Name Primary?     Primary osteoarthritis of right knee      Chronic pain of right knee         Treatment Diagnosis:  Right knee pain        Goals:     02/22/22 0500   Body Part   Goals listed below are for Knee   Goal #1   Goal #1 stairs   Previous Functional Level No restrictions   Current Functional Level Ascend/descend stairs   Performance Level 6/10 pain   STG Target Performance Ascend/descend stairs;one step at a time   Performance Level 4/10 pain   Rationale for safe community access to buildings   Due Date 03/15/22   LTG Target Performance Ascend/descend stairs;in a normal reciprocal pattern   Performance Level 2-3/10 pain   Rationale for safe community access to buildings   Due Date 04/19/22   Goal #2   Goal #2 standing   Previous Functional Level No restrictions   Current Functional Level Minutes patient can stand   Performance level 30 with 6/10 pain   STG Target Performance Minutes patient will be able to stand   Performance level 30 with 4/10 pain   Rationale for safe community ambulation   Due date 03/15/22   LTG Target Performance Minutes patient will be able to stand   Performance Level 45 with 2-3/10 pain   Rationale for safe community ambulation   Due date 04/19/22       Therapy Frequency:  1 time per week  Predicted Duration of Therapy Intervention:  8 weeks    [place] PT                 I CERTIFY THE NEED FOR THESE SERVICES  FURNISHED UNDER        THIS PLAN OF TREATMENT AND WHILE UNDER MY CARE     (Physician attestation of this document indicates review and certification of the therapy plan).                       Certification Date From:  02/22/22   Certification Date To:  04/19/22    Referring Provider:  Shaka De Luna    Initial Assessment        See Epic Evaluation SOC Date: 02/22/22

## 2022-03-02 ENCOUNTER — THERAPY VISIT (OUTPATIENT)
Dept: PHYSICAL THERAPY | Facility: CLINIC | Age: 62
End: 2022-03-02
Attending: ORTHOPAEDIC SURGERY
Payer: COMMERCIAL

## 2022-03-02 DIAGNOSIS — M25.561 CHRONIC PAIN OF RIGHT KNEE: ICD-10-CM

## 2022-03-02 DIAGNOSIS — G89.29 CHRONIC PAIN OF RIGHT KNEE: ICD-10-CM

## 2022-03-02 PROCEDURE — 97110 THERAPEUTIC EXERCISES: CPT | Mod: GP | Performed by: PHYSICAL THERAPIST

## 2022-03-02 PROCEDURE — 97140 MANUAL THERAPY 1/> REGIONS: CPT | Mod: GP | Performed by: PHYSICAL THERAPIST

## 2022-03-09 ENCOUNTER — THERAPY VISIT (OUTPATIENT)
Dept: PHYSICAL THERAPY | Facility: CLINIC | Age: 62
End: 2022-03-09
Attending: ORTHOPAEDIC SURGERY
Payer: COMMERCIAL

## 2022-03-09 DIAGNOSIS — M25.561 CHRONIC PAIN OF RIGHT KNEE: ICD-10-CM

## 2022-03-09 DIAGNOSIS — G89.29 CHRONIC PAIN OF RIGHT KNEE: ICD-10-CM

## 2022-03-09 PROCEDURE — 97110 THERAPEUTIC EXERCISES: CPT | Mod: GP | Performed by: PHYSICAL THERAPIST

## 2022-03-09 PROCEDURE — 97140 MANUAL THERAPY 1/> REGIONS: CPT | Mod: GP | Performed by: PHYSICAL THERAPIST

## 2022-03-30 ENCOUNTER — THERAPY VISIT (OUTPATIENT)
Dept: PHYSICAL THERAPY | Facility: CLINIC | Age: 62
End: 2022-03-30
Payer: COMMERCIAL

## 2022-03-30 DIAGNOSIS — M25.561 CHRONIC PAIN OF RIGHT KNEE: ICD-10-CM

## 2022-03-30 DIAGNOSIS — G89.29 CHRONIC PAIN OF RIGHT KNEE: ICD-10-CM

## 2022-03-30 PROCEDURE — 97110 THERAPEUTIC EXERCISES: CPT | Mod: GP | Performed by: PHYSICAL THERAPIST

## 2022-03-30 PROCEDURE — 97140 MANUAL THERAPY 1/> REGIONS: CPT | Mod: GP | Performed by: PHYSICAL THERAPIST

## 2022-04-06 ENCOUNTER — THERAPY VISIT (OUTPATIENT)
Dept: PHYSICAL THERAPY | Facility: CLINIC | Age: 62
End: 2022-04-06
Payer: COMMERCIAL

## 2022-04-06 DIAGNOSIS — G89.29 CHRONIC PAIN OF RIGHT KNEE: ICD-10-CM

## 2022-04-06 DIAGNOSIS — M25.561 CHRONIC PAIN OF RIGHT KNEE: ICD-10-CM

## 2022-04-06 PROCEDURE — 97140 MANUAL THERAPY 1/> REGIONS: CPT | Mod: GP | Performed by: PHYSICAL THERAPIST

## 2022-04-06 PROCEDURE — 97110 THERAPEUTIC EXERCISES: CPT | Mod: GP | Performed by: PHYSICAL THERAPIST

## 2022-04-12 ENCOUNTER — THERAPY VISIT (OUTPATIENT)
Dept: PHYSICAL THERAPY | Facility: CLINIC | Age: 62
End: 2022-04-12
Payer: COMMERCIAL

## 2022-04-12 DIAGNOSIS — M25.561 RIGHT KNEE PAIN: Primary | ICD-10-CM

## 2022-04-12 PROCEDURE — 97140 MANUAL THERAPY 1/> REGIONS: CPT | Mod: GP | Performed by: PHYSICAL THERAPIST

## 2022-04-12 PROCEDURE — 97110 THERAPEUTIC EXERCISES: CPT | Mod: GP | Performed by: PHYSICAL THERAPIST

## 2022-04-13 ENCOUNTER — OFFICE VISIT (OUTPATIENT)
Dept: FAMILY MEDICINE | Facility: CLINIC | Age: 62
End: 2022-04-13
Payer: COMMERCIAL

## 2022-04-13 VITALS
HEART RATE: 61 BPM | OXYGEN SATURATION: 98 % | WEIGHT: 171 LBS | SYSTOLIC BLOOD PRESSURE: 110 MMHG | TEMPERATURE: 98.2 F | HEIGHT: 61 IN | DIASTOLIC BLOOD PRESSURE: 72 MMHG | BODY MASS INDEX: 32.28 KG/M2

## 2022-04-13 DIAGNOSIS — K59.00 CONSTIPATION, UNSPECIFIED CONSTIPATION TYPE: ICD-10-CM

## 2022-04-13 DIAGNOSIS — R10.32 LLQ ABDOMINAL PAIN: Primary | ICD-10-CM

## 2022-04-13 PROBLEM — E66.9 OBESITY: Status: ACTIVE | Noted: 2022-04-13

## 2022-04-13 PROBLEM — M25.561 RIGHT KNEE PAIN: Status: RESOLVED | Noted: 2022-02-22 | Resolved: 2022-04-13

## 2022-04-13 LAB
ALBUMIN SERPL-MCNC: 3.9 G/DL (ref 3.4–5)
ALBUMIN UR-MCNC: NEGATIVE MG/DL
ALP SERPL-CCNC: 99 U/L (ref 40–150)
ALT SERPL W P-5'-P-CCNC: 41 U/L (ref 0–50)
ANION GAP SERPL CALCULATED.3IONS-SCNC: 5 MMOL/L (ref 3–14)
APPEARANCE UR: CLEAR
AST SERPL W P-5'-P-CCNC: 24 U/L (ref 0–45)
BASOPHILS # BLD AUTO: 0 10E3/UL (ref 0–0.2)
BASOPHILS NFR BLD AUTO: 0 %
BILIRUB SERPL-MCNC: 0.6 MG/DL (ref 0.2–1.3)
BILIRUB UR QL STRIP: NEGATIVE
BUN SERPL-MCNC: 14 MG/DL (ref 7–30)
CALCIUM SERPL-MCNC: 9.2 MG/DL (ref 8.5–10.1)
CHLORIDE BLD-SCNC: 107 MMOL/L (ref 94–109)
CO2 SERPL-SCNC: 28 MMOL/L (ref 20–32)
COLOR UR AUTO: YELLOW
CREAT SERPL-MCNC: 0.53 MG/DL (ref 0.52–1.04)
EOSINOPHIL # BLD AUTO: 0.1 10E3/UL (ref 0–0.7)
EOSINOPHIL NFR BLD AUTO: 3 %
ERYTHROCYTE [DISTWIDTH] IN BLOOD BY AUTOMATED COUNT: 12.4 % (ref 10–15)
ERYTHROCYTE [SEDIMENTATION RATE] IN BLOOD BY WESTERGREN METHOD: 7 MM/HR (ref 0–30)
GFR SERPL CREATININE-BSD FRML MDRD: >90 ML/MIN/1.73M2
GLUCOSE BLD-MCNC: 113 MG/DL (ref 70–99)
GLUCOSE UR STRIP-MCNC: NEGATIVE MG/DL
HCT VFR BLD AUTO: 40.8 % (ref 35–47)
HGB BLD-MCNC: 13.5 G/DL (ref 11.7–15.7)
HGB UR QL STRIP: NEGATIVE
KETONES UR STRIP-MCNC: NEGATIVE MG/DL
LEUKOCYTE ESTERASE UR QL STRIP: NEGATIVE
LYMPHOCYTES # BLD AUTO: 1.7 10E3/UL (ref 0.8–5.3)
LYMPHOCYTES NFR BLD AUTO: 37 %
MCH RBC QN AUTO: 30.5 PG (ref 26.5–33)
MCHC RBC AUTO-ENTMCNC: 33.1 G/DL (ref 31.5–36.5)
MCV RBC AUTO: 92 FL (ref 78–100)
MONOCYTES # BLD AUTO: 0.4 10E3/UL (ref 0–1.3)
MONOCYTES NFR BLD AUTO: 8 %
NEUTROPHILS # BLD AUTO: 2.4 10E3/UL (ref 1.6–8.3)
NEUTROPHILS NFR BLD AUTO: 52 %
NITRATE UR QL: NEGATIVE
PH UR STRIP: 5.5 [PH] (ref 5–7)
PLATELET # BLD AUTO: 236 10E3/UL (ref 150–450)
POTASSIUM BLD-SCNC: 4.2 MMOL/L (ref 3.4–5.3)
PROT SERPL-MCNC: 7.6 G/DL (ref 6.8–8.8)
RBC # BLD AUTO: 4.43 10E6/UL (ref 3.8–5.2)
SODIUM SERPL-SCNC: 140 MMOL/L (ref 133–144)
SP GR UR STRIP: 1.02 (ref 1–1.03)
UROBILINOGEN UR STRIP-ACNC: 0.2 E.U./DL
WBC # BLD AUTO: 4.5 10E3/UL (ref 4–11)

## 2022-04-13 PROCEDURE — 99214 OFFICE O/P EST MOD 30 MIN: CPT | Performed by: FAMILY MEDICINE

## 2022-04-13 PROCEDURE — 85025 COMPLETE CBC W/AUTO DIFF WBC: CPT | Performed by: FAMILY MEDICINE

## 2022-04-13 PROCEDURE — 80053 COMPREHEN METABOLIC PANEL: CPT | Performed by: FAMILY MEDICINE

## 2022-04-13 PROCEDURE — 36415 COLL VENOUS BLD VENIPUNCTURE: CPT | Performed by: FAMILY MEDICINE

## 2022-04-13 PROCEDURE — 81003 URINALYSIS AUTO W/O SCOPE: CPT | Performed by: FAMILY MEDICINE

## 2022-04-13 PROCEDURE — 85652 RBC SED RATE AUTOMATED: CPT | Performed by: FAMILY MEDICINE

## 2022-04-13 RX ORDER — POLYETHYLENE GLYCOL 3350 17 G/17G
1 POWDER, FOR SOLUTION ORAL DAILY
Qty: 116 G | Refills: 0 | Status: SHIPPED | OUTPATIENT
Start: 2022-04-13 | End: 2023-04-03

## 2022-04-13 NOTE — LETTER
April 14, 2022      Gary Hinkle  [address] [place] [id number]        Dear ,    We are writing to inform you of your test results.    Your results are normal. Call or return to clinic as needed if these symptoms worsen or fail to improve as anticipated.          Resulted Orders   ESR: Erythrocyte sedimentation rate   Result Value Ref Range    Erythrocyte Sedimentation Rate 7 0 - 30 mm/hr   UA Macro with Reflex to Micro and Culture - lab collect   Result Value Ref Range    Color Urine Yellow Colorless, Straw, Light Yellow, Yellow    Appearance Urine Clear Clear    Glucose Urine Negative Negative mg/dL    Bilirubin Urine Negative Negative    Ketones Urine Negative Negative mg/dL    Specific Gravity Urine 1.020 1.003 - 1.035    Blood Urine Negative Negative    pH Urine 5.5 5.0 - 7.0    Protein Albumin Urine Negative Negative mg/dL    Urobilinogen Urine 0.2 0.2, 1.0 E.U./dL    Nitrite Urine Negative Negative    Leukocyte Esterase Urine Negative Negative    Narrative    Microscopic not indicated   Comprehensive metabolic panel (BMP + Alb, Alk Phos, ALT, AST, Total. Bili, TP)   Result Value Ref Range    Sodium 140 133 - 144 mmol/L    Potassium 4.2 3.4 - 5.3 mmol/L    Chloride 107 94 - 109 mmol/L    Carbon Dioxide (CO2) 28 20 - 32 mmol/L    Anion Gap 5 3 - 14 mmol/L    Urea Nitrogen 14 7 - 30 mg/dL    Creatinine 0.53 0.52 - 1.04 mg/dL    Calcium 9.2 8.5 - 10.1 mg/dL    Glucose 113 (H) 70 - 99 mg/dL    Alkaline Phosphatase 99 40 - 150 U/L    AST 24 0 - 45 U/L    ALT 41 0 - 50 U/L    Protein Total 7.6 6.8 - 8.8 g/dL    Albumin 3.9 3.4 - 5.0 g/dL    Bilirubin Total 0.6 0.2 - 1.3 mg/dL    GFR Estimate >90 >60 mL/min/1.73m2      Comment:      Effective December 21, 2021 eGFRcr in adults is calculated using the 2021 CKD-EPI creatinine equation which includes age and gender (Linda henderson al., [facility], DOI: 10.1056/QQDRwr3880460)   CBC with platelets and differential   Result Value Ref Range    WBC Count 4.5 4.0 - 11.0 10e3/uL     RBC Count 4.43 3.80 - 5.20 10e6/uL    Hemoglobin 13.5 11.7 - 15.7 g/dL    Hematocrit 40.8 35.0 - 47.0 %    MCV 92 78 - 100 fL    MCH 30.5 26.5 - 33.0 pg    MCHC 33.1 31.5 - 36.5 g/dL    RDW 12.4 10.0 - 15.0 %    Platelet Count 236 150 - 450 10e3/uL    % Neutrophils 52 %    % Lymphocytes 37 %    % Monocytes 8 %    % Eosinophils 3 %    % Basophils 0 %    Absolute Neutrophils 2.4 1.6 - 8.3 10e3/uL    Absolute Lymphocytes 1.7 0.8 - 5.3 10e3/uL    Absolute Monocytes 0.4 0.0 - 1.3 10e3/uL    Absolute Eosinophils 0.1 0.0 - 0.7 10e3/uL    Absolute Basophils 0.0 0.0 - 0.2 10e3/uL       If you have any questions or concerns, please call the clinic at the number listed above.       Sincerely,      Pretty Monte MD/ruiz

## 2022-04-13 NOTE — RESULT ENCOUNTER NOTE
Mail letter:    Your results are normal. Call or return to clinic as needed if these symptoms worsen or fail to improve as anticipated.     Pretty Monte MD

## 2022-04-13 NOTE — PROGRESS NOTES
Assessment & Plan     (R10.32) LLQ abdominal pain  (primary encounter diagnosis)  Comment: Differential diagnoses would include: diverticulitis, constipation, inflammatory bowel disease, irritable bowel syndrome, mass; no evidence of hernia on examination   Plan: CBC with platelets and differential,         Comprehensive metabolic panel (BMP + Alb, Alk         Phos, ALT, AST, Total. Bili, TP), UA Macro with        Reflex to Micro and Culture - lab collect,         polyethylene glycol (MIRALAX) 17 GM/Dose         powder, ESR: Erythrocyte sedimentation rate, CT        Abdomen Pelvis w Contrast        Call or return to clinic as needed if these symptoms worsen or fail to improve      (K59.00) Constipation, unspecified constipation type  Plan: Miralax daily recommended, if above is negative. Call or return to clinic as needed if these symptoms worsen or fail to improve as anticipated.              See Patient Instructions    Return in about 1 month (around 5/13/2022) for physical.    Pretty Monte MD  [facility] GILLES Vega is a 61 year old who presents for the following health issues  accompanied by her spouse and  [id number].    HPI     Pain History:  When did you first notice your pain? - Less than 1 week   Have you seen anyone else for your pain? No  Where in your body do you have pain? Abdominal/Flank Pain  Onset/Duration: 4 days  Description:   Character: Feels like it is swelling up and expanding  Location: left lower quadrant  Radiation: Back  Intensity: severe  Progression of Symptoms:  worsening  Accompanying Signs & Symptoms:  Fever/Chills: no  Gas/Bloating: no  Nausea: no  Vomitting: no  Diarrhea: no  Constipation: YES  Dysuria or Hematuria: no  History:   Trauma: no  Previous similar pain: Has had low back pain  Previous tests done: none  Precipitating factors:   Does the pain change with:     Food: no    Bowel Movement: no    Urination: no   Other factors:   "no  Therapies tried and outcome: None  No LMP recorded. Patient is postmenopausal.            Review of Systems   CONSTITUTIONAL: NEGATIVE for fever, chills, change in weight  INTEGUMENTARY/SKIN: NEGATIVE for worrisome rashes, moles or lesions  ENT/MOUTH: NEGATIVE for ear, mouth and throat problems  RESP: NEGATIVE for significant cough or SOB  CV: NEGATIVE for chest pain, palpitations or peripheral edema  GI: POSITIVE for abdominal pain LLQ and constipation and NEGATIVE for diarrhea, hematochezia, melena, nausea, poor appetite, vomiting and weight loss  : NEGATIVE for frequency, dysuria, or hematuria      Objective    /72 (BP Location: Right arm, Patient Position: Sitting, Cuff Size: Adult Regular)   Pulse 61   Temp 98.2  F (36.8  C) (Oral)   Ht 1.557 m (5' 1.3\")   Wt 77.6 kg (171 lb)   SpO2 98%   BMI 32.00 kg/m    Body mass index is 32 kg/m .  Physical Exam   GENERAL: alert, no distress and obese  EYES: Eyes grossly normal to inspection, PERRL and conjunctivae and sclerae normal  HENT: ear canals and TM's normal, nose and mouth without ulcers or lesions  NECK: no adenopathy, no asymmetry, masses, or scars and thyroid normal to palpation  RESP: lungs clear to auscultation - no rales, rhonchi or wheezes  CV: regular rate and rhythm, normal S1 S2, no S3 or S4, no murmur, click or rub, no peripheral edema   ABDOMEN: tenderness LLQ, no organomegaly or masses, bowel sounds normal and no palpable or pulsatile masses  MS: no gross musculoskeletal defects noted, no edema  PSYCH: mentation appears normal, affect normal/bright    Results for orders placed or performed in visit on 04/13/22 (from the past 24 hour(s))   CBC with platelets and differential    Narrative    The following orders were created for panel order CBC with platelets and differential.  Procedure                               Abnormality         Status                     ---------                               -----------         ------        "              CBC with platelets and d...[[id number]]                      In process                   Please view results for these tests on the individual orders.

## 2022-04-19 ENCOUNTER — THERAPY VISIT (OUTPATIENT)
Dept: PHYSICAL THERAPY | Facility: CLINIC | Age: 62
End: 2022-04-19
Payer: COMMERCIAL

## 2022-04-19 DIAGNOSIS — G89.29 CHRONIC PAIN OF RIGHT KNEE: ICD-10-CM

## 2022-04-19 DIAGNOSIS — M25.561 CHRONIC PAIN OF RIGHT KNEE: ICD-10-CM

## 2022-04-19 PROCEDURE — 97140 MANUAL THERAPY 1/> REGIONS: CPT | Mod: GP | Performed by: PHYSICAL THERAPIST

## 2022-04-19 PROCEDURE — 97110 THERAPEUTIC EXERCISES: CPT | Mod: GP | Performed by: PHYSICAL THERAPIST

## 2022-04-19 NOTE — PROGRESS NOTES
[facility]    OUTPATIENT Physical Therapy ORTHOPEDIC EVALUATION  PLAN OF TREATMENT FOR OUTPATIENT REHABILITATION  (COMPLETE FOR INITIAL CLAIMS ONLY)  Patient's Last Name, First Name, M.I.  YOB: 1960  Gary Hinkle       Provider s Name:  [facility]   Medical Record No.  [id number]   Start of Care Date:  02/22/22   Onset Date:   02/04/22 (Date of Order)   Type:     _X__PT   ___OT Medical Diagnosis:  Chronic pain of right knee / Primary osteoarthritis of right knee     Treatment Diagnosis:  Right knee pain        Goals:     04/19/22 0500   Body Part   Goals listed below are for Knee   Goal #1   Goal #1 stairs   Previous Functional Level No restrictions   Current Functional Level Ascend/descend stairs   Performance Level 6/10 pain   STG Target Performance Ascend/descend stairs;one step at a time   Performance Level 4/10 pain   Rationale for safe community access to buildings   Due Date 03/15/22   If goal not met, Why? Greater than 4/10 pain   LTG Target Performance Ascend/descend stairs;in a normal reciprocal pattern   Performance Level 2-3/10 pain   Rationale for safe community access to buildings   Due Date 04/19/22   Goal #2   Goal #2 standing   Previous Functional Level No restrictions   Current Functional Level Minutes patient can stand   Performance level 30 with 6/10 pain   STG Target Performance Minutes patient will be able to stand   Performance level 30 with 4/10 pain   Rationale for safe community ambulation   Due date 03/15/22   If goal not met, Why? Greater than 4/10 pain   LTG Target Performance Minutes patient will be able to stand   Performance Level 45 with 2-3/10 pain   Rationale for safe community ambulation   Due date 04/19/22       Therapy Frequency:  1 time per week  Predicted Duration of Therapy Intervention:  6 weeks    Devon Rock, PT                  I CERTIFY THE NEED FOR THESE SERVICES FURNISHED UNDER        THIS PLAN OF TREATMENT AND WHILE UNDER MY CARE     (Physician attestation of this document indicates review and certification of the therapy plan).                     Certification Date From:  04/20/22   Certification Date To:  05/31/22    Referring Provider:  Shaka De Luna    Initial Assessment        See Epic Evaluation SOC Date: 02/22/22

## 2022-04-19 NOTE — PROGRESS NOTES
PROGRESS  REPORT    Progress reporting period is from 2/22/2022 to 4/19/2022.       SUBJECTIVE  Subjective: Knees are still hurting. Not doing HEP for knee because hurt back. 70% improvement since beginning physical therapy.    Current Pain level: 5/10.     Initial Pain level: 6/10.   Changes in function:  None  Adverse reaction to treatment or activity: None    OBJECTIVE  Changes noted in objective findings:  The objective findings below are from DOS 4/19/2022.    PROM - Flexion - 107  Palpation - Hypomobility of tibiofemoral joint and tibiofibular joint  Gait - Noticeable limp with ambulation in PT gym. Difficulty with knee extension     ASSESSMENT/PLAN  Updated problem list and treatment plan: Diagnosis 1:  Right knee pain  Pain -  manual therapy, splint/taping/bracing/orthotics, self management, education and home program  Decreased ROM/flexibility - manual therapy, therapeutic exercise, therapeutic activity and home program  Decreased joint mobility - manual therapy, therapeutic exercise, therapeutic activity and home program  Decreased strength - therapeutic exercise, therapeutic activities and home program  Impaired gait - gait training, assistive devices and home program  Decreased function - therapeutic activities and home program  STG/LTGs have been met or progress has been made towards goals:  Yes, Pt states she has seen a 70% improvement since beginning physical therapy however has not met goals currently.  Assessment of Progress: The patient's condition is improving.  Self Management Plans:  Patient has been instructed in a home treatment program.  Patient is independent in a home treatment program.  I have re-evaluated this patient and find that the nature, scope, duration and intensity of the therapy is appropriate for the medical condition of the patient.  Limei continues to require the following intervention to meet STG and LTG's:  PT    Recommendations:  This patient would benefit from continued  therapy.     Frequency:  1 X week, once daily  Duration:  for 6 weeks        Please refer to the daily flowsheet for treatment today, total treatment time and time spent performing 1:1 timed codes.

## 2022-04-26 ENCOUNTER — THERAPY VISIT (OUTPATIENT)
Dept: PHYSICAL THERAPY | Facility: CLINIC | Age: 62
End: 2022-04-26
Payer: COMMERCIAL

## 2022-04-26 DIAGNOSIS — M25.561 RIGHT KNEE PAIN, UNSPECIFIED CHRONICITY: Primary | ICD-10-CM

## 2022-04-26 PROCEDURE — 97140 MANUAL THERAPY 1/> REGIONS: CPT | Mod: GP | Performed by: PHYSICAL THERAPIST

## 2022-04-26 PROCEDURE — 97110 THERAPEUTIC EXERCISES: CPT | Mod: GP | Performed by: PHYSICAL THERAPIST

## 2022-04-26 PROCEDURE — 97530 THERAPEUTIC ACTIVITIES: CPT | Mod: GP | Performed by: PHYSICAL THERAPIST

## 2022-04-26 ASSESSMENT — ACTIVITIES OF DAILY LIVING (ADL)
RISE FROM A CHAIR: ACTIVITY IS FAIRLY DIFFICULT
GO UP STAIRS: ACTIVITY IS FAIRLY DIFFICULT
KNEE_ACTIVITY_OF_DAILY_LIVING_SCORE: 38.57
STIFFNESS: THE SYMPTOM AFFECTS MY ACTIVITY SEVERELY
PAIN: THE SYMPTOM AFFECTS MY ACTIVITY MODERATELY
KNEEL ON THE FRONT OF YOUR KNEE: ACTIVITY IS VERY DIFFICULT
WEAKNESS: THE SYMPTOM AFFECTS MY ACTIVITY MODERATELY
SWELLING: THE SYMPTOM AFFECTS MY ACTIVITY SLIGHTLY
LIMPING: THE SYMPTOM AFFECTS MY ACTIVITY MODERATELY
AS_A_RESULT_OF_YOUR_KNEE_INJURY,_HOW_WOULD_YOU_RATE_YOUR_CURRENT_LEVEL_OF_DAILY_ACTIVITY?: ABNORMAL
SQUAT: ACTIVITY IS VERY DIFFICULT
SIT WITH YOUR KNEE BENT: ACTIVITY IS VERY DIFFICULT
GIVING WAY, BUCKLING OR SHIFTING OF KNEE: THE SYMPTOM AFFECTS MY ACTIVITY SLIGHTLY
GO DOWN STAIRS: ACTIVITY IS FAIRLY DIFFICULT
WALK: ACTIVITY IS FAIRLY DIFFICULT
STAND: ACTIVITY IS SOMEWHAT DIFFICULT
RAW_SCORE: 27
KNEE_ACTIVITY_OF_DAILY_LIVING_SUM: 27
HOW_WOULD_YOU_RATE_THE_CURRENT_FUNCTION_OF_YOUR_KNEE_DURING_YOUR_USUAL_DAILY_ACTIVITIES_ON_A_SCALE_FROM_0_TO_100_WITH_100_BEING_YOUR_LEVEL_OF_KNEE_FUNCTION_PRIOR_TO_YOUR_INJURY_AND_0_BEING_THE_INABILITY_TO_PERFORM_ANY_OF_YOUR_USUAL_DAILY_ACTIVITIES?: 30
HOW_WOULD_YOU_RATE_THE_OVERALL_FUNCTION_OF_YOUR_KNEE_DURING_YOUR_USUAL_DAILY_ACTIVITIES?: ABNORMAL

## 2022-05-03 ENCOUNTER — THERAPY VISIT (OUTPATIENT)
Dept: PHYSICAL THERAPY | Facility: CLINIC | Age: 62
End: 2022-05-03
Payer: COMMERCIAL

## 2022-05-03 DIAGNOSIS — M25.561 RIGHT KNEE PAIN: Primary | ICD-10-CM

## 2022-05-03 PROCEDURE — 97140 MANUAL THERAPY 1/> REGIONS: CPT | Mod: GP | Performed by: PHYSICAL THERAPIST

## 2022-05-03 PROCEDURE — 97110 THERAPEUTIC EXERCISES: CPT | Mod: GP | Performed by: PHYSICAL THERAPIST

## 2022-05-03 PROCEDURE — 97035 APP MDLTY 1+ULTRASOUND EA 15: CPT | Mod: GP | Performed by: PHYSICAL THERAPIST

## 2022-05-03 NOTE — PROGRESS NOTES
"CHIEF COMPLAINT:   Chief Complaint   Patient presents with     Right Knee - Pain     Patient is right handed and unemployed. Onset: sharp pain 2 years ago. NKI. Patient is utilizing an . Pain just came on and has gotten worse over time. Pain is on both sides of the knee and inferior knee. Pain increases with walking or standing for a long time. Hx of injection about 12 years ago in [place], no relief. She has had physical therapy, no relief.        Today's encounter utilized a language specific   via iPad today to obtain the HPI, PAST MEDICAL/SURGICAL history, social history, family history, medications and allergies and review of systems; in addition to perform physical examination; review pertinent imaging studies; discuss exam findings and assessment; and go over treatment plan.        HISTORY OF PRESENT ILLNESS    Gary Hinkle is a 61 year old female seen for followup evaluation of ongoing right knee pain with no known injury worse the past ~2 years. Seen for the same 2/4/2022, noted to have osteoarthritis. Declined injection at the time, opted for only Physical Therapy. Returns today with continued pain, getting worse. Wonders about replacing her knee cap. Continues to take tylenol.    Pain has been present for more than 10 years. Locates pain across the front of the knee and long the inner/outer aspects, aggravated with weight bearing activities such as walking, standing, twisting, stairs. Pain is a \"shooting and burning\" pain. Ok at rest unless moves around. Positive night pain.    Reports injection 12 years ago in [place], nothing recently.    Does have low back pain, 40+ years.        Present symptoms: pain medially , laterally and anteriorly, pain shooting, burning , moderate pain.    Pain severity: 6/10  Frequency of symptoms: are constant  Exacerbating Factors: weight bearing, stairs, [id number], prolonged standing  Relieving Factors: rest, sitting  Night Pain: Yes  Pain while at rest: " "No   Numbness or tingling: left thigh/knee area seems numb   Patient has tried:     NSAIDS: Yes , in the past but not recently.     Physical Therapy: Yes      Activity modification: Yes      Bracing: No      Injections: 12 years ago in [place] , nothing recently.     Ice: No      Assistive device:  No     Other: tylenol.      Other PMH:  has a past medical history of Hyperlipidemia LDL goal < 130, Hypothyroidism, Lichen sclerosus of female genitalia (10/9/2015), and Obesity.  Patient Active Problem List   Diagnosis     Hyperlipidemia LDL goal <130     HDL deficiency     Hypothyroidism     Lichen sclerosus of female genitalia     Obesity     Right knee pain       Surgical Hx:  has a past surgical history that includes appendectomy.    Medications:   Current Outpatient Medications:      clobetasol (TEMOVATE) 0.05 % external ointment, Apply thin layer to effected area twice daily, Disp: 15 g, Rfl: 0     NP THYROID 60 MG tablet, Take 1 tablet (60 mg) by mouth daily Take 1 tablet daily, Disp: 90 tablet, Rfl: 1     polyethylene glycol (MIRALAX) 17 GM/Dose powder, Take 17 g (1 capful) by mouth daily, Disp: 116 g, Rfl: 0     simvastatin (ZOCOR) 10 MG tablet, Take 1 tablet (10 mg) by mouth At Bedtime, Disp: 90 tablet, Rfl: 3    Allergies: No Known Allergies    Social Hx: does not work.   reports that she has never smoked. She has never used smokeless tobacco. She reports that she does not drink alcohol and does not use drugs.    Family Hx: family history includes C.A.D. in her mother.    REVIEW OF SYSTEMS:   CONSTITUTIONAL:NEGATIVE for fever, chills, change in weight  INTEGUMENTARY/SKIN: NEGATIVE for worrisome rashes, moles or lesions  MUSCULOSKELETAL:See HPI above  NEURO: NEGATIVE for weakness, dizziness or paresthesias    PHYSICAL EXAM:  /80   Pulse 80   Ht 1.575 m (5' 2\")   Wt 79.6 kg (175 lb 6.4 oz)   BMI 32.08 kg/m     GENERAL APPEARANCE: healthy, alert, no distress; accompanied by her ;  via " "iPad  SKIN: no suspicious lesions or rashes  NEURO: Normal strength and tone, mentation intact and speech normal  PSYCH:  mentation appears normal and affect normal, not anxious  RESPIRATORY: No increased work of breathing.    BILATERAL LOWER EXTREMITIES:  Gait: favors the right.  Alignment: varus  Bilateral  Quad atrophy, strength weak  Intact sensation deep peroneal nerve, superficial peroneal nerve, med/lat tibial nerve, sural nerve, saphenous nerve  Intact EHL, EDL, TA, FHL, GS, quadriceps hamstrings and hip flexors  Bilateral calf soft and nttp or squeeze.  Edema: trace    LEFT KNEE EXAM:    Skin: intact, no ecchymosis or erythema  ROM: near full extension to 125 flexion  Tight hamstrings on straight leg raise.  Effusion: none  Tender: medial and lateral joint line, pes  Patellofemoral joint:                Apprehension: negative              Crepitations: moderate-severe   Grind: positive.    RIGHT KNEE EXAM:    Skin: intact, no ecchymosis or erythema  ROM: near full extension to 110+ flexion, diffuse discomfort with flexion  Tight hamstrings on straight leg raise.  Effusion: trace  Tender: diffuse anterior knee, medial and lateral joint line, pes  Patellofemoral joint:                Apprehension: negative              Crepitations: moderate   Grind: positive.      X-RAY: no new images today.    3 views right knee 2/4/2022  were again reviewed in clinic today. On my review, no obvious fractures or dislocations. There is advanced patello-femoral degenerative changes with marginal osteophytes. Mild medial and lateral narrowing. Small medial and lateral marginal osteophytes. Osteopenia.           ASSESSMENT/PLAN: Gary Hinkle is a 61 year old female with chronic right knee pain, primary osteoarthritis.     * reviewed imaging studies with patient, showing arthritic changes, or wearing of the cartilage in the knee. This can be caused by normal \"wear and tear\" over the years or following prior injury to the knee.  * " "most osteoarthritis is under the knee cap area.    Treatment typically starts nonsurgically. Surgical indication for total knee arthroplasty  when nonsurgical management is no longer effective.    At this time, she's interested in surgery, but wants to discuss with family at home and let us know.    Return to clinic as needed.      ** Risks of surgery include, but not limited to: bleeding (possibly requiring transfusion), infection, pain, scar, damage to adjacent structures (e.g. Nerves, blood vessels, bone, cartilage), temporary or permanent nerve damage, recurrence of symptoms, implant dislocation, instability, implant failure, implant infection, unequal limb lengths, malalignment, stiffness, need for further surgery, blood clots, pulmonary embolism, risks of anesthesia, and death.  * the knee will not feel \"normal\" as it won't be \"normal\" after knee replacement. It may feel \"clunky\" due to the nature of the hard metal and plastic implant.  * the expectation is for improved pain, not necessarily complete pain relief.  * discussed will plan hospitalization overnight,  daily Physical Therapy starting either the day of or day after surgery, with discharge to home or short term rehabilitation facility, depending on postoperative recovery and progress during hospitalization.  * will plan postoperative, pharmacologic deep vein thrombosis prophylaxis x4 weeks.   * postoperative pain control will be oral medications upon discharge from hospital  * postoperative Physical Therapy to start upon discharge from the hospital.  * patient will need preoperative H+P prior to surgery from primary care provider     * patient encouraged to call in interim if any new questions or concerns arise.    * recommend no elective dental procedures for 4-6 months after surgery. Any emergency dental procedures, mouth infections, abscesses, etc must be taken care of immediately with preventive antibiotics.   * Patient will need longterm " prophylactic antibiotics use with dental procedures or other invasive procedures (2 g amoxicilin or 450mg ([id number]) clindamycin) 1 hour prior to dental procedures for a minimum of 2 years postoperative.      Shaka De Luna M.D., M.S.  Dept. of Orthopaedic Surgery  [facility]

## 2022-05-04 ENCOUNTER — OFFICE VISIT (OUTPATIENT)
Dept: ORTHOPEDICS | Facility: CLINIC | Age: 62
End: 2022-05-04
Payer: COMMERCIAL

## 2022-05-04 VITALS
DIASTOLIC BLOOD PRESSURE: 80 MMHG | HEART RATE: 80 BPM | BODY MASS INDEX: 32.28 KG/M2 | WEIGHT: 175.4 LBS | SYSTOLIC BLOOD PRESSURE: 130 MMHG | HEIGHT: 62 IN

## 2022-05-04 DIAGNOSIS — M17.11 PRIMARY OSTEOARTHRITIS OF RIGHT KNEE: Primary | ICD-10-CM

## 2022-05-04 DIAGNOSIS — M25.561 CHRONIC PAIN OF RIGHT KNEE: ICD-10-CM

## 2022-05-04 DIAGNOSIS — G89.29 CHRONIC PAIN OF RIGHT KNEE: ICD-10-CM

## 2022-05-04 PROCEDURE — 99213 OFFICE O/P EST LOW 20 MIN: CPT | Performed by: ORTHOPAEDIC SURGERY

## 2022-05-04 ASSESSMENT — PAIN SCALES - GENERAL: PAINLEVEL: SEVERE PAIN (6)

## 2022-05-04 NOTE — LETTER
"    5/4/2022         RE: Gary Hinkle  [address] [place] [address]  [place] [address]        Dear Colleague,    Thank you for referring your patient, Gary Hinkle, to the [facility]. Please see a copy of my visit note below.    CHIEF COMPLAINT:   Chief Complaint   Patient presents with     Right Knee - Pain     Patient is right handed and unemployed. Onset: sharp pain 2 years ago. NKI. Patient is utilizing an . Pain just came on and has gotten worse over time. Pain is on both sides of the knee and inferior knee. Pain increases with walking or standing for a long time. Hx of injection about 12 years ago in [place], no relief. She has had physical therapy, no relief.        Today's encounter utilized a language specific   via iPad today to obtain the HPI, PAST MEDICAL/SURGICAL history, social history, family history, medications and allergies and review of systems; in addition to perform physical examination; review pertinent imaging studies; discuss exam findings and assessment; and go over treatment plan.        HISTORY OF PRESENT ILLNESS    Gary Hinkle is a 61 year old female seen for followup evaluation of ongoing right knee pain with no known injury worse the past ~2 years. Seen for the same 2/4/2022, noted to have osteoarthritis. Declined injection at the time, opted for only Physical Therapy. Returns today with continued pain, getting worse. Wonders about replacing her knee cap. Continues to take tylenol.    Pain has been present for more than 10 years. Locates pain across the front of the knee and long the inner/outer aspects, aggravated with weight bearing activities such as walking, standing, twisting, stairs. Pain is a \"shooting and burning\" pain. Ok at rest unless moves around. Positive night pain.    Reports injection 12 years ago in [place], nothing recently.    Does have low back pain, 40+ years.        Present symptoms: pain medially , laterally and anteriorly, pain shooting, " burning , moderate pain.    Pain severity: 6/10  Frequency of symptoms: are constant  Exacerbating Factors: weight bearing, stairs, [id number], prolonged standing  Relieving Factors: rest, sitting  Night Pain: Yes  Pain while at rest: No   Numbness or tingling: left thigh/knee area seems numb   Patient has tried:     NSAIDS: Yes , in the past but not recently.     Physical Therapy: Yes      Activity modification: Yes      Bracing: No      Injections: 12 years ago in [place] , nothing recently.     Ice: No      Assistive device:  No     Other: tylenol.      Other PMH:  has a past medical history of Hyperlipidemia LDL goal < 130, Hypothyroidism, Lichen sclerosus of female genitalia (10/9/2015), and Obesity.  Patient Active Problem List   Diagnosis     Hyperlipidemia LDL goal <130     HDL deficiency     Hypothyroidism     Lichen sclerosus of female genitalia     Obesity     Right knee pain       Surgical Hx:  has a past surgical history that includes appendectomy.    Medications:   Current Outpatient Medications:      clobetasol (TEMOVATE) 0.05 % external ointment, Apply thin layer to effected area twice daily, Disp: 15 g, Rfl: 0     NP THYROID 60 MG tablet, Take 1 tablet (60 mg) by mouth daily Take 1 tablet daily, Disp: 90 tablet, Rfl: 1     polyethylene glycol (MIRALAX) 17 GM/Dose powder, Take 17 g (1 capful) by mouth daily, Disp: 116 g, Rfl: 0     simvastatin (ZOCOR) 10 MG tablet, Take 1 tablet (10 mg) by mouth At Bedtime, Disp: 90 tablet, Rfl: 3    Allergies: No Known Allergies    Social Hx: does not work.   reports that she has never smoked. She has never used smokeless tobacco. She reports that she does not drink alcohol and does not use drugs.    Family Hx: family history includes C.A.D. in her mother.    REVIEW OF SYSTEMS:   CONSTITUTIONAL:NEGATIVE for fever, chills, change in weight  INTEGUMENTARY/SKIN: NEGATIVE for worrisome rashes, moles or lesions  MUSCULOSKELETAL:See HPI above  NEURO: NEGATIVE for  "weakness, dizziness or paresthesias    PHYSICAL EXAM:  /80   Pulse 80   Ht 1.575 m (5' 2\")   Wt 79.6 kg (175 lb 6.4 oz)   BMI 32.08 kg/m     GENERAL APPEARANCE: healthy, alert, no distress; accompanied by her ;  via iPad  SKIN: no suspicious lesions or rashes  NEURO: Normal strength and tone, mentation intact and speech normal  PSYCH:  mentation appears normal and affect normal, not anxious  RESPIRATORY: No increased work of breathing.    BILATERAL LOWER EXTREMITIES:  Gait: favors the right.  Alignment: varus  Bilateral  Quad atrophy, strength weak  Intact sensation deep peroneal nerve, superficial peroneal nerve, med/lat tibial nerve, sural nerve, saphenous nerve  Intact EHL, EDL, TA, FHL, GS, quadriceps hamstrings and hip flexors  Bilateral calf soft and nttp or squeeze.  Edema: trace    LEFT KNEE EXAM:    Skin: intact, no ecchymosis or erythema  ROM: near full extension to 125 flexion  Tight hamstrings on straight leg raise.  Effusion: none  Tender: medial and lateral joint line, pes  Patellofemoral joint:                Apprehension: negative              Crepitations: moderate-severe   Grind: positive.    RIGHT KNEE EXAM:    Skin: intact, no ecchymosis or erythema  ROM: near full extension to 110+ flexion, diffuse discomfort with flexion  Tight hamstrings on straight leg raise.  Effusion: trace  Tender: diffuse anterior knee, medial and lateral joint line, pes  Patellofemoral joint:                Apprehension: negative              Crepitations: moderate   Grind: positive.      X-RAY: no new images today.    3 views right knee 2/4/2022  were again reviewed in clinic today. On my review, no obvious fractures or dislocations. There is advanced patello-femoral degenerative changes with marginal osteophytes. Mild medial and lateral narrowing. Small medial and lateral marginal osteophytes. Osteopenia.           ASSESSMENT/PLAN: Gary Hinkle is a 61 year old female with chronic right knee " "pain, primary osteoarthritis.     * reviewed imaging studies with patient, showing arthritic changes, or wearing of the cartilage in the knee. This can be caused by normal \"wear and tear\" over the years or following prior injury to the knee.  * most osteoarthritis is under the knee cap area.    Treatment typically starts nonsurgically. Surgical indication for total knee arthroplasty  when nonsurgical management is no longer effective.    At this time, she's interested in surgery, but wants to discuss with family at home and let us know.    Return to clinic as needed.      ** Risks of surgery include, but not limited to: bleeding (possibly requiring transfusion), infection, pain, scar, damage to adjacent structures (e.g. Nerves, blood vessels, bone, cartilage), temporary or permanent nerve damage, recurrence of symptoms, implant dislocation, instability, implant failure, implant infection, unequal limb lengths, malalignment, stiffness, need for further surgery, blood clots, pulmonary embolism, risks of anesthesia, and death.  * the knee will not feel \"normal\" as it won't be \"normal\" after knee replacement. It may feel \"clunky\" due to the nature of the hard metal and plastic implant.  * the expectation is for improved pain, not necessarily complete pain relief.  * discussed will plan hospitalization overnight,  daily Physical Therapy starting either the day of or day after surgery, with discharge to home or short term rehabilitation facility, depending on postoperative recovery and progress during hospitalization.  * will plan postoperative, pharmacologic deep vein thrombosis prophylaxis x4 weeks.   * postoperative pain control will be oral medications upon discharge from hospital  * postoperative Physical Therapy to start upon discharge from the hospital.  * patient will need preoperative H+P prior to surgery from primary care provider     * patient encouraged to call in interim if any new questions or concerns " arise.    * recommend no elective dental procedures for 4-6 months after surgery. Any emergency dental procedures, mouth infections, abscesses, etc must be taken care of immediately with preventive antibiotics.   * Patient will need longterm prophylactic antibiotics use with dental procedures or other invasive procedures (2 g amoxicilin or 450mg ([id number]) clindamycin) 1 hour prior to dental procedures for a minimum of 2 years postoperative.      Shaka De Luna M.D., M.S.  Dept. of Orthopaedic Surgery  [facility]        Again, thank you for allowing me to participate in the care of your patient.        Sincerely,        Shaka De Luna MD

## 2022-05-09 NOTE — PROGRESS NOTES
"CHIEF COMPLAINT:   Chief Complaint   Patient presents with     Right Knee - Follow Up     Pt is here for follow up for R knee pain. She describes pain as painful. Can only stand for 7 minutes. Pain level is a 6 today. If she walks too long she feels 'softness and pain' in her knee.       Today's encounter utilized a language specific   via iPad today to obtain the HPI, PAST MEDICAL/SURGICAL history, social history, family history, medications and allergies and review of systems; in addition to perform physical examination; review pertinent imaging studies; discuss exam findings and assessment; and go over treatment plan.        HISTORY OF PRESENT ILLNESS    Gary Hinkle is a 61 year old female seen for followup evaluation of ongoing right knee pain with no known injury worse the past ~2 years. Seen for the same 2/4/2022, noted to have osteoarthritis. Declined injection at the time, opted for only Physical Therapy. Returns today with continued pain, getting worse. Talked to son and want to proceed with knee replacement surgery. Continues to take tylenol.    Pain has been present for more than 10 years. Locates pain across the front of the knee and long the inner/outer aspects, aggravated with weight bearing activities such as walking, standing, twisting, stairs. Pain is a \"shooting and burning\" pain. Ok at rest unless moves around. Positive night pain.    Reports injection 12 years ago in [place], nothing recently.    Does have low back pain, 40+ years.        Present symptoms: pain medially , laterally and anteriorly, pain shooting, burning , moderate pain.    Pain severity: 6/10  Frequency of symptoms: are constant  Exacerbating Factors: weight bearing, stairs, [id number], prolonged standing  Relieving Factors: rest, sitting  Night Pain: Yes  Pain while at rest: No   Numbness or tingling: left thigh/knee area seems numb   Patient has tried:     NSAIDS: Yes , in the past but not recently.     Physical " Therapy: Yes      Activity modification: Yes      Bracing: No      Injections: 12 years ago in [place] , nothing recently.     Ice: No      Assistive device:  No     Other: tylenol.      Other PMH:  has a past medical history of Hyperlipidemia LDL goal < 130, Hypothyroidism, Lichen sclerosus of female genitalia (10/9/2015), and Obesity.  Patient Active Problem List   Diagnosis     Hyperlipidemia LDL goal <130     HDL deficiency     Hypothyroidism     Lichen sclerosus of female genitalia     Obesity     Right knee pain       Surgical Hx:  has a past surgical history that includes appendectomy.    Medications:   Current Outpatient Medications:      clobetasol (TEMOVATE) 0.05 % external ointment, Apply thin layer to effected area twice daily, Disp: 15 g, Rfl: 0     NP THYROID 60 MG tablet, Take 1 tablet (60 mg) by mouth daily Take 1 tablet daily, Disp: 90 tablet, Rfl: 1     polyethylene glycol (MIRALAX) 17 GM/Dose powder, Take 17 g (1 capful) by mouth daily, Disp: 116 g, Rfl: 0     simvastatin (ZOCOR) 10 MG tablet, Take 1 tablet (10 mg) by mouth At Bedtime, Disp: 90 tablet, Rfl: 3    Allergies: No Known Allergies    Social Hx: does not work.   reports that she has never smoked. She has never used smokeless tobacco. She reports that she does not drink alcohol and does not use drugs.    Family Hx: family history includes C.A.D. in her mother.    REVIEW OF SYSTEMS:   CONSTITUTIONAL:NEGATIVE for fever, chills, change in weight  INTEGUMENTARY/SKIN: NEGATIVE for worrisome rashes, moles or lesions  MUSCULOSKELETAL:See HPI above  NEURO: NEGATIVE for weakness, dizziness or paresthesias    PHYSICAL EXAM:  /81   Pulse 77   Wt 78.5 kg (173 lb)   BMI 31.64 kg/m     GENERAL APPEARANCE: healthy, alert, no distress; accompanied by her ;  via iPad  SKIN: no suspicious lesions or rashes  NEURO: Normal strength and tone, mentation intact and speech normal  PSYCH:  mentation appears normal and affect normal, not  "anxious  RESPIRATORY: No increased work of breathing.    BILATERAL LOWER EXTREMITIES:  Gait: favors the right.  Alignment: varus  Bilateral  Quad atrophy, strength weak  Intact sensation deep peroneal nerve, superficial peroneal nerve, med/lat tibial nerve, sural nerve, saphenous nerve  Intact EHL, EDL, TA, FHL, GS, quadriceps hamstrings and hip flexors  Bilateral calf soft and nttp or squeeze.  Edema: trace    LEFT KNEE EXAM:    Skin: intact, no ecchymosis or erythema  ROM: near full extension to 125 flexion  Tight hamstrings on straight leg raise.  Effusion: none  Tender: medial and lateral joint line, pes  Patellofemoral joint:                Apprehension: negative              Crepitations: moderate-severe   Grind: positive.    RIGHT KNEE EXAM:    Skin: intact, no ecchymosis or erythema  ROM: near full extension to 110+ flexion, diffuse discomfort with flexion  Tight hamstrings on straight leg raise.  Effusion: trace  Tender: diffuse anterior knee, medial and lateral joint line, pes  Patellofemoral joint:                Apprehension: negative              Crepitations: moderate   Grind: positive.      X-RAY: no new images today.    3 views right knee 2/4/2022  were again reviewed in clinic today. On my review, no obvious fractures or dislocations. There is advanced patello-femoral degenerative changes with marginal osteophytes. Mild medial and lateral narrowing. Small medial and lateral marginal osteophytes. Osteopenia.           ASSESSMENT/PLAN: Gary Hinkle is a 61 year old female with chronic right knee pain, primary osteoarthritis.     * reviewed imaging studies with patient, showing arthritic changes, or wearing of the cartilage in the knee. This can be caused by normal \"wear and tear\" over the years or following prior injury to the knee.  * most osteoarthritis is under the knee cap area.    Treatment typically starts nonsurgically. Surgical indication for total knee arthroplasty  when nonsurgical management " "is no longer effective.    At this time, she's interested in surgery, so wants to proceed.      ** Risks of surgery include, but not limited to: bleeding (possibly requiring transfusion), infection, pain, scar, damage to adjacent structures (e.g. Nerves, blood vessels, bone, cartilage), temporary or permanent nerve damage, recurrence of symptoms, implant dislocation, instability, implant failure, implant infection, unequal limb lengths, malalignment, stiffness, need for further surgery, blood clots, pulmonary embolism, risks of anesthesia, and death.  * the knee will not feel \"normal\" as it won't be \"normal\" after knee replacement. It may feel \"clunky\" due to the nature of the hard metal and plastic implant.  * the expectation is for improved pain, not necessarily complete pain relief.  * discussed will plan hospitalization overnight,  daily Physical Therapy starting either the day of or day after surgery, with discharge to home or short term rehabilitation facility, depending on postoperative recovery and progress during hospitalization.  * will plan postoperative, pharmacologic deep vein thrombosis prophylaxis x4 weeks.   * postoperative pain control will be oral medications upon discharge from hospital  * postoperative Physical Therapy to start upon discharge from the hospital.  * patient will need preoperative H+P prior to surgery from primary care provider     * patient encouraged to call in interim if any new questions or concerns arise.    * recommend no elective dental procedures for 4-6 months after surgery. Any emergency dental procedures, mouth infections, abscesses, etc must be taken care of immediately with preventive antibiotics.   * Patient will need longterm prophylactic antibiotics use with dental procedures or other invasive procedures (2 g amoxicilin or 450mg ([id number]) clindamycin) 1 hour prior to dental procedures for a minimum of 2 years postoperative.    * baseline KOOS, PROMIS today.      Shaka " TOSHA De Luna M.D., M.S.  Dept. of Orthopaedic Surgery  [facility]

## 2022-05-13 ENCOUNTER — OFFICE VISIT (OUTPATIENT)
Dept: ORTHOPEDICS | Facility: CLINIC | Age: 62
End: 2022-05-13
Payer: COMMERCIAL

## 2022-05-13 VITALS
WEIGHT: 173 LBS | SYSTOLIC BLOOD PRESSURE: 135 MMHG | DIASTOLIC BLOOD PRESSURE: 81 MMHG | HEART RATE: 77 BPM | BODY MASS INDEX: 31.64 KG/M2

## 2022-05-13 DIAGNOSIS — M17.11 PRIMARY OSTEOARTHRITIS OF RIGHT KNEE: Primary | ICD-10-CM

## 2022-05-13 DIAGNOSIS — M25.561 CHRONIC PAIN OF RIGHT KNEE: ICD-10-CM

## 2022-05-13 DIAGNOSIS — G89.29 CHRONIC PAIN OF RIGHT KNEE: ICD-10-CM

## 2022-05-13 PROCEDURE — 99213 OFFICE O/P EST LOW 20 MIN: CPT | Performed by: ORTHOPAEDIC SURGERY

## 2022-05-13 RX ORDER — TRANEXAMIC ACID 650 MG/1
1950 TABLET ORAL ONCE
Status: CANCELLED | OUTPATIENT
Start: 2022-05-13 | End: 2022-05-13

## 2022-05-13 ASSESSMENT — PAIN SCALES - GENERAL: PAINLEVEL: SEVERE PAIN (6)

## 2022-05-13 NOTE — LETTER
"    5/13/2022         RE: Gary Hinkle  [address] [place], [place] [address] [id number]        Dear Colleague,    Thank you for referring your patient, Gary Hinkle, to the [facility]. Please see a copy of my visit note below.    CHIEF COMPLAINT:   Chief Complaint   Patient presents with     Right Knee - Follow Up     Pt is here for follow up for R knee pain. She describes pain as painful. Can only stand for 7 minutes. Pain level is a 6 today. If she walks too long she feels 'softness and pain' in her knee.       Today's encounter utilized a language specific   via iPad today to obtain the HPI, PAST MEDICAL/SURGICAL history, social history, family history, medications and allergies and review of systems; in addition to perform physical examination; review pertinent imaging studies; discuss exam findings and assessment; and go over treatment plan.        HISTORY OF PRESENT ILLNESS    Gary Hinkle is a 61 year old female seen for followup evaluation of ongoing right knee pain with no known injury worse the past ~2 years. Seen for the same 2/4/2022, noted to have osteoarthritis. Declined injection at the time, opted for only Physical Therapy. Returns today with continued pain, getting worse. Talked to son and want to proceed with knee replacement surgery. Continues to take tylenol.    Pain has been present for more than 10 years. Locates pain across the front of the knee and long the inner/outer aspects, aggravated with weight bearing activities such as walking, standing, twisting, stairs. Pain is a \"shooting and burning\" pain. Ok at rest unless moves around. Positive night pain.    Reports injection 12 years ago in [place], nothing recently.    Does have low back pain, 40+ years.        Present symptoms: pain medially , laterally and anteriorly, pain shooting, burning , moderate pain.    Pain severity: 6/10  Frequency of symptoms: are constant  Exacerbating Factors: weight bearing, stairs, [id number], " prolonged standing  Relieving Factors: rest, sitting  Night Pain: Yes  Pain while at rest: No   Numbness or tingling: left thigh/knee area seems numb   Patient has tried:     NSAIDS: Yes , in the past but not recently.     Physical Therapy: Yes      Activity modification: Yes      Bracing: No      Injections: 12 years ago in [place] , nothing recently.     Ice: No      Assistive device:  No     Other: tylenol.      Other PMH:  has a past medical history of Hyperlipidemia LDL goal < 130, Hypothyroidism, Lichen sclerosus of female genitalia (10/9/2015), and Obesity.  Patient Active Problem List   Diagnosis     Hyperlipidemia LDL goal <130     HDL deficiency     Hypothyroidism     Lichen sclerosus of female genitalia     Obesity     Right knee pain       Surgical Hx:  has a past surgical history that includes appendectomy.    Medications:   Current Outpatient Medications:      clobetasol (TEMOVATE) 0.05 % external ointment, Apply thin layer to effected area twice daily, Disp: 15 g, Rfl: 0     NP THYROID 60 MG tablet, Take 1 tablet (60 mg) by mouth daily Take 1 tablet daily, Disp: 90 tablet, Rfl: 1     polyethylene glycol (MIRALAX) 17 GM/Dose powder, Take 17 g (1 capful) by mouth daily, Disp: 116 g, Rfl: 0     simvastatin (ZOCOR) 10 MG tablet, Take 1 tablet (10 mg) by mouth At Bedtime, Disp: 90 tablet, Rfl: 3    Allergies: No Known Allergies    Social Hx: does not work.   reports that she has never smoked. She has never used smokeless tobacco. She reports that she does not drink alcohol and does not use drugs.    Family Hx: family history includes C.A.D. in her mother.    REVIEW OF SYSTEMS:   CONSTITUTIONAL:NEGATIVE for fever, chills, change in weight  INTEGUMENTARY/SKIN: NEGATIVE for worrisome rashes, moles or lesions  MUSCULOSKELETAL:See HPI above  NEURO: NEGATIVE for weakness, dizziness or paresthesias    PHYSICAL EXAM:  /81   Pulse 77   Wt 78.5 kg (173 lb)   BMI 31.64 kg/m     GENERAL APPEARANCE: healthy,  "alert, no distress; accompanied by her ;  via iPad  SKIN: no suspicious lesions or rashes  NEURO: Normal strength and tone, mentation intact and speech normal  PSYCH:  mentation appears normal and affect normal, not anxious  RESPIRATORY: No increased work of breathing.    BILATERAL LOWER EXTREMITIES:  Gait: favors the right.  Alignment: varus  Bilateral  Quad atrophy, strength weak  Intact sensation deep peroneal nerve, superficial peroneal nerve, med/lat tibial nerve, sural nerve, saphenous nerve  Intact EHL, EDL, TA, FHL, GS, quadriceps hamstrings and hip flexors  Bilateral calf soft and nttp or squeeze.  Edema: trace    LEFT KNEE EXAM:    Skin: intact, no ecchymosis or erythema  ROM: near full extension to 125 flexion  Tight hamstrings on straight leg raise.  Effusion: none  Tender: medial and lateral joint line, pes  Patellofemoral joint:                Apprehension: negative              Crepitations: moderate-severe   Grind: positive.    RIGHT KNEE EXAM:    Skin: intact, no ecchymosis or erythema  ROM: near full extension to 110+ flexion, diffuse discomfort with flexion  Tight hamstrings on straight leg raise.  Effusion: trace  Tender: diffuse anterior knee, medial and lateral joint line, pes  Patellofemoral joint:                Apprehension: negative              Crepitations: moderate   Grind: positive.      X-RAY: no new images today.    3 views right knee 2/4/2022  were again reviewed in clinic today. On my review, no obvious fractures or dislocations. There is advanced patello-femoral degenerative changes with marginal osteophytes. Mild medial and lateral narrowing. Small medial and lateral marginal osteophytes. Osteopenia.           ASSESSMENT/PLAN: Gary Hinkle is a 61 year old female with chronic right knee pain, primary osteoarthritis.     * reviewed imaging studies with patient, showing arthritic changes, or wearing of the cartilage in the knee. This can be caused by normal \"wear and " "tear\" over the years or following prior injury to the knee.  * most osteoarthritis is under the knee cap area.    Treatment typically starts nonsurgically. Surgical indication for total knee arthroplasty  when nonsurgical management is no longer effective.    At this time, she's interested in surgery, so wants to proceed.      ** Risks of surgery include, but not limited to: bleeding (possibly requiring transfusion), infection, pain, scar, damage to adjacent structures (e.g. Nerves, blood vessels, bone, cartilage), temporary or permanent nerve damage, recurrence of symptoms, implant dislocation, instability, implant failure, implant infection, unequal limb lengths, malalignment, stiffness, need for further surgery, blood clots, pulmonary embolism, risks of anesthesia, and death.  * the knee will not feel \"normal\" as it won't be \"normal\" after knee replacement. It may feel \"clunky\" due to the nature of the hard metal and plastic implant.  * the expectation is for improved pain, not necessarily complete pain relief.  * discussed will plan hospitalization overnight,  daily Physical Therapy starting either the day of or day after surgery, with discharge to home or short term rehabilitation facility, depending on postoperative recovery and progress during hospitalization.  * will plan postoperative, pharmacologic deep vein thrombosis prophylaxis x4 weeks.   * postoperative pain control will be oral medications upon discharge from hospital  * postoperative Physical Therapy to start upon discharge from the hospital.  * patient will need preoperative H+P prior to surgery from primary care provider     * patient encouraged to call in interim if any new questions or concerns arise.    * recommend no elective dental procedures for 4-6 months after surgery. Any emergency dental procedures, mouth infections, abscesses, etc must be taken care of immediately with preventive antibiotics.   * Patient will need longterm prophylactic " antibiotics use with dental procedures or other invasive procedures (2 g amoxicilin or 450mg ([id number]) clindamycin) 1 hour prior to dental procedures for a minimum of 2 years postoperative.    * baseline KOOS, PROMIS today.      Shaka De Luna M.D., M.S.  Dept. of Orthopaedic Surgery  [facility]      Again, thank you for allowing me to participate in the care of your patient.        Sincerely,        Shaka De Luna MD

## 2022-05-17 ENCOUNTER — THERAPY VISIT (OUTPATIENT)
Dept: PHYSICAL THERAPY | Facility: CLINIC | Age: 62
End: 2022-05-17
Payer: COMMERCIAL

## 2022-05-17 DIAGNOSIS — M25.561 RIGHT KNEE PAIN: Primary | ICD-10-CM

## 2022-05-17 PROCEDURE — 97140 MANUAL THERAPY 1/> REGIONS: CPT | Mod: GP | Performed by: PHYSICAL THERAPIST

## 2022-05-17 NOTE — PROGRESS NOTES
DISCHARGE REPORT    Progress reporting period is from 2/22/2022 to 5/17/2022.       SUBJECTIVE  Subjective: Having surgery in September on the knee. Will be having a total knee replacement. Hasn't seen a lot of progress since starting physical therpay.    Current Pain level: 7/10.     Initial Pain level: 6/10.   Changes in function:  None  Adverse reaction to treatment or activity: None    OBJECTIVE  Changes noted in objective findings:  The objective findings below are from DOS 5/17/2022.    Gait - Reduced heel strike and knee extension during gait resulting in noticeable limp  Palpation - Hypertonicity of gastrocnemius and hypomobility of tibiofemoral joint  ROM - 0-10-90     ASSESSMENT/PLAN  Updated problem list and treatment plan: Diagnosis 1:  Right knee pain  STG/LTGs have been met or progress has been made towards goals:  None  Assessment of Progress: The patient's condition is unchanged.  Self Management Plans:  Patient has been instructed in a home treatment program.  Patient is independent in a home treatment program.  I have re-evaluated this patient and find that the nature, scope, duration and intensity of the therapy is appropriate for the medical condition of the patient.      Recommendations:  The patient has not seen progress in terms of her knee pain and will be having a total knee replacement in September. Pt would like to be discharged to [facility] at this time.    Please refer to the daily flowsheet for treatment today, total treatment time and time spent performing 1:1 timed codes.

## 2022-09-11 DIAGNOSIS — E03.9 ACQUIRED HYPOTHYROIDISM: ICD-10-CM

## 2022-09-12 RX ORDER — LEVOTHYROXINE, LIOTHYRONINE 38; 9 UG/1; UG/1
TABLET ORAL
Qty: 90 TABLET | Refills: 0 | Status: SHIPPED | OUTPATIENT
Start: 2022-09-12 | End: 2022-12-06

## 2022-09-15 ENCOUNTER — OFFICE VISIT (OUTPATIENT)
Dept: INTERNAL MEDICINE | Facility: CLINIC | Age: 62
End: 2022-09-15
Payer: COMMERCIAL

## 2022-09-15 VITALS
HEART RATE: 78 BPM | BODY MASS INDEX: 31.56 KG/M2 | WEIGHT: 171.5 LBS | RESPIRATION RATE: 16 BRPM | OXYGEN SATURATION: 97 % | SYSTOLIC BLOOD PRESSURE: 118 MMHG | HEIGHT: 62 IN | TEMPERATURE: 98.2 F | DIASTOLIC BLOOD PRESSURE: 72 MMHG

## 2022-09-15 DIAGNOSIS — Z12.31 VISIT FOR SCREENING MAMMOGRAM: ICD-10-CM

## 2022-09-15 DIAGNOSIS — R73.09 ELEVATED GLUCOSE: ICD-10-CM

## 2022-09-15 DIAGNOSIS — Z01.818 PREOP GENERAL PHYSICAL EXAM: Primary | ICD-10-CM

## 2022-09-15 DIAGNOSIS — Z13.220 SCREENING FOR HYPERLIPIDEMIA: ICD-10-CM

## 2022-09-15 DIAGNOSIS — Z12.4 CERVICAL CANCER SCREENING: ICD-10-CM

## 2022-09-15 DIAGNOSIS — Z11.4 SCREENING FOR HIV (HUMAN IMMUNODEFICIENCY VIRUS): ICD-10-CM

## 2022-09-15 LAB
HBA1C MFR BLD: 5.8 % (ref 0–5.6)
HGB BLD-MCNC: 14.1 G/DL (ref 11.7–15.7)

## 2022-09-15 PROCEDURE — 99214 OFFICE O/P EST MOD 30 MIN: CPT | Performed by: INTERNAL MEDICINE

## 2022-09-15 PROCEDURE — 83036 HEMOGLOBIN GLYCOSYLATED A1C: CPT | Performed by: INTERNAL MEDICINE

## 2022-09-15 PROCEDURE — 80048 BASIC METABOLIC PNL TOTAL CA: CPT | Performed by: INTERNAL MEDICINE

## 2022-09-15 PROCEDURE — 80061 LIPID PANEL: CPT | Performed by: INTERNAL MEDICINE

## 2022-09-15 PROCEDURE — 93000 ELECTROCARDIOGRAM COMPLETE: CPT | Performed by: INTERNAL MEDICINE

## 2022-09-15 PROCEDURE — 36415 COLL VENOUS BLD VENIPUNCTURE: CPT | Performed by: INTERNAL MEDICINE

## 2022-09-15 PROCEDURE — 87389 HIV-1 AG W/HIV-1&-2 AB AG IA: CPT | Performed by: INTERNAL MEDICINE

## 2022-09-15 PROCEDURE — 85018 HEMOGLOBIN: CPT | Performed by: INTERNAL MEDICINE

## 2022-09-15 NOTE — PROGRESS NOTES
[facility]  [address] [address] [facility]  [place] [address]  Phone: [phone]  Primary Provider: Opal Goff  Pre-op Performing Provider:    JOCELYN CHAHAL  VIDEO,       PREOPERATIVE EVALUATION:  Today's date: 9/15/2022    Gary Hinkle is a 61 year old female who presents for a preoperative evaluation.    Surgical Information:  Surgery/Procedure: ARTHROPLASTY, RIGHT KNEE, TOTAL  Surgery Location: [facility]  Surgeon: Shaka De Luna MD  Surgery Date: 9/27/2022  Time of Surgery:10:30 AM  Where patient plans to recover: At home with family  Fax number for surgical facility: Note does not need to be faxed, will be available electronically in Epic.    Type of Anesthesia Anticipated: Spinal    Assessment & Plan     The proposed surgical procedure is considered INTERMEDIATE risk.    Preop general physical exam  This is a fully suitable operative candidate      Risks and Recommendations:  The patient has the following additional risks and recommendations for perioperative complications:   - No identified additional risk factors other than previously addressed    Medication Instructions:  Patient is to take all scheduled medications on the day of surgery    RECOMMENDATION:  APPROVAL GIVEN to proceed with proposed procedure, without further diagnostic evaluation.        26 minutes spent on the date of the encounter doing chart review, history and exam, documentation and further activities per the note      Subjective     HPI related to upcoming procedure: progressive right knee pain and scheduled for total right knee replacement       Preop Questions 9/15/2022   1. Have you ever had a heart attack or stroke? No   2. Have you ever had surgery on your heart or blood vessels, such as a stent placement, a coronary artery bypass, or surgery on an artery in your head, neck, heart, or legs? No   3. Do you have chest pain with activity? YES - clarification. We discharge her symptoms at some length  And  she is experiencing being out of breath with exercise , no symptoms consistent with a angina pectoralis    4. Do you have a history of  heart failure? No   5. Do you currently have a cold, bronchitis or symptoms of other infection? No   6. Do you have a cough, shortness of breath, or wheezing? No   7. Do you or anyone in your family have previous history of blood clots? No   8. Do you or does anyone in your family have a serious bleeding problem such as prolonged bleeding following surgeries or cuts? No   9. Have you ever had problems with anemia or been told to take iron pills? No   10. Have you had any abnormal blood loss such as black, tarry or bloody stools, or abnormal vaginal bleeding? No   11. Have you ever had a blood transfusion? No   12. Are you willing to have a blood transfusion if it is medically needed before, during, or after your surgery? Yes   13. Have you or any of your relatives ever had problems with anesthesia? No   14. Do you have sleep apnea, excessive snoring or daytime drowsiness? No   15. Do you have any artifical heart valves or other implanted medical devices like a pacemaker, defibrillator, or continuous glucose monitor? No   16. Do you have artificial joints? No   17. Are you allergic to latex? No     Health Care Directive:  Patient does not have a Health Care Directive or Living Will: Discussed advance care planning with patient; information given to patient to review.    Preoperative Review of :   reviewed - no record of controlled substances prescribed.      Status of Chronic Conditions:  See problem list for active medical problems.  Problems all longstanding and stable, except as noted/documented.  See ROS for pertinent symptoms related to these conditions.      Review of Systems  CONSTITUTIONAL: NEGATIVE for fever, chills, change in weight  ENT/MOUTH: NEGATIVE for ear, mouth and throat problems  RESP: NEGATIVE for significant cough or SOB  CV: NEGATIVE for chest pain,  "palpitations or peripheral edema  ENDOCRINE: NEGATIVE for temperature intolerance, skin/hair changes  HEME/ALLERGY/IMMUNE: NEGATIVE for bleeding problems  PSYCHIATRIC: NEGATIVE for changes in mood or affect  ROS otherwise negative    Patient Active Problem List    Diagnosis Date Noted     Obesity 04/13/2022     Priority: Medium     Lichen sclerosus of female genitalia 10/09/2015     Priority: Medium     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     Priority: Medium     Hypothyroidism 09/08/2013     Priority: Medium     Positive thyroid antibodies         Hyperlipidemia LDL goal <130 09/06/2013     Priority: Medium      Past Medical History:   Diagnosis Date     Hyperlipidemia LDL goal < 130      Hypothyroidism      Lichen sclerosus of female genitalia 10/9/2015     Obesity      Past Surgical History:   Procedure Laterality Date     APPENDECTOMY       Current Outpatient Medications   Medication Sig Dispense Refill     clobetasol (TEMOVATE) 0.05 % external ointment Apply thin layer to effected area twice daily 15 g 0     NP THYROID 60 MG tablet TAKE ONE TABLET BY MOUTH ONE TIME DAILY 90 tablet 0     polyethylene glycol (MIRALAX) 17 GM/Dose powder Take 17 g (1 capful) by mouth daily 116 g 0     simvastatin (ZOCOR) 10 MG tablet Take 1 tablet (10 mg) by mouth At Bedtime 90 tablet 3       No Known Allergies     Social History     Tobacco Use     Smoking status: Never Smoker     Smokeless tobacco: Never Used   Substance Use Topics     Alcohol use: No     Family History   Problem Relation Age of Onset     C.A.D. Mother         d age 80     History   Drug Use No         Objective     /72   Pulse 78   Temp 98.2  F (36.8  C) (Oral)   Resp 16   Ht 1.575 m (5' 2\")   Wt 77.8 kg (171 lb 8 oz)   LMP  (LMP Unknown)   SpO2 97%   Breastfeeding No   BMI 31.37 kg/m      Physical Exam  GENERAL APPEARANCE: healthy, alert and no distress  HENT: ear canals and TM's normal and nose and mouth without ulcers or lesions  NECK: no adenopathy, no " asymmetry, masses, or scars and thyroid normal to palpation  RESP: lungs clear to auscultation - no rales, rhonchi or wheezes  CV: regular rate and rhythm, normal S1 S2, no S3 or S4 and no murmur, click or rub   ABDOMEN: soft, nontender, no HSM or masses and bowel sounds normal  NEURO: Normal strength and tone, sensory exam grossly normal, mentation intact and speech normal  PSYCH: mentation appears normal and affect normal/bright    Recent Labs   Lab Test 04/13/22  1022 10/06/21  1635   HGB 13.5  --      --     138   POTASSIUM 4.2 4.0   CR 0.53 0.64   A1C  --  5.8*        Diagnostics:  Orders Placed This Encounter   Procedures     REVIEW OF HEALTH MAINTENANCE PROTOCOL ORDERS     HIV Antigen Antibody Combo     Lipid panel reflex to direct LDL Non-fasting     Basic metabolic panel  (Ca, Cl, CO2, Creat, Gluc, K, Na, BUN)     Hemoglobin     Hemoglobin A1c     EKG 12-lead complete w/read - Clinics        Revised Cardiac Risk Index (RCRI):  The patient has the following serious cardiovascular risks for perioperative complications:   - No serious cardiac risks = 0 points     RCRI Interpretation: 0 points: Class I (very low risk - 0.4% complication rate)           Signed Electronically by: Dave Fernandez MD  Copy of this evaluation report is provided to requesting physician.

## 2022-09-15 NOTE — H&P (VIEW-ONLY)
[facility]  [address]  [place] [address]  Phone: [phone]  Primary Provider: Opal Goff  Pre-op Performing Provider:    JOCELYN CHAHAL  VIDEO,       PREOPERATIVE EVALUATION:  Today's date: 9/15/2022    Gary Hinkle is a 61 year old female who presents for a preoperative evaluation.    Surgical Information:  Surgery/Procedure: ARTHROPLASTY, RIGHT KNEE, TOTAL  Surgery Location: [facility]  Surgeon: Shaka De Luna MD  Surgery Date: 9/27/2022  Time of Surgery:10:30 AM  Where patient plans to recover: At home with family  Fax number for surgical facility: Note does not need to be faxed, will be available electronically in Epic.    Type of Anesthesia Anticipated: Spinal    Assessment & Plan     The proposed surgical procedure is considered INTERMEDIATE risk.    Preop general physical exam  This is a fully suitable operative candidate      Risks and Recommendations:  The patient has the following additional risks and recommendations for perioperative complications:   - No identified additional risk factors other than previously addressed    Medication Instructions:  Patient is to take all scheduled medications on the day of surgery    RECOMMENDATION:  APPROVAL GIVEN to proceed with proposed procedure, without further diagnostic evaluation.        26 minutes spent on the date of the encounter doing chart review, history and exam, documentation and further activities per the note      Subjective     HPI related to upcoming procedure: progressive right knee pain and scheduled for total right knee replacement       Preop Questions 9/15/2022   1. Have you ever had a heart attack or stroke? No   2. Have you ever had surgery on your heart or blood vessels, such as a stent placement, a coronary artery bypass, or surgery on an artery in your head, neck, heart, or legs? No   3. Do you have chest pain with activity? YES - clarification. We discharge her symptoms at some length  And  she is experiencing being out of breath with exercise , no symptoms consistent with a angina pectoralis    4. Do you have a history of  heart failure? No   5. Do you currently have a cold, bronchitis or symptoms of other infection? No   6. Do you have a cough, shortness of breath, or wheezing? No   7. Do you or anyone in your family have previous history of blood clots? No   8. Do you or does anyone in your family have a serious bleeding problem such as prolonged bleeding following surgeries or cuts? No   9. Have you ever had problems with anemia or been told to take iron pills? No   10. Have you had any abnormal blood loss such as black, tarry or bloody stools, or abnormal vaginal bleeding? No   11. Have you ever had a blood transfusion? No   12. Are you willing to have a blood transfusion if it is medically needed before, during, or after your surgery? Yes   13. Have you or any of your relatives ever had problems with anesthesia? No   14. Do you have sleep apnea, excessive snoring or daytime drowsiness? No   15. Do you have any artifical heart valves or other implanted medical devices like a pacemaker, defibrillator, or continuous glucose monitor? No   16. Do you have artificial joints? No   17. Are you allergic to latex? No     Health Care Directive:  Patient does not have a Health Care Directive or Living Will: Discussed advance care planning with patient; information given to patient to review.    Preoperative Review of :   reviewed - no record of controlled substances prescribed.      Status of Chronic Conditions:  See problem list for active medical problems.  Problems all longstanding and stable, except as noted/documented.  See ROS for pertinent symptoms related to these conditions.      Review of Systems  CONSTITUTIONAL: NEGATIVE for fever, chills, change in weight  ENT/MOUTH: NEGATIVE for ear, mouth and throat problems  RESP: NEGATIVE for significant cough or SOB  CV: NEGATIVE for chest pain,  "palpitations or peripheral edema  ENDOCRINE: NEGATIVE for temperature intolerance, skin/hair changes  HEME/ALLERGY/IMMUNE: NEGATIVE for bleeding problems  PSYCHIATRIC: NEGATIVE for changes in mood or affect  ROS otherwise negative    Patient Active Problem List    Diagnosis Date Noted     Obesity 04/13/2022     Priority: Medium     Lichen sclerosus of female genitalia 10/09/2015     Priority: Medium     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     Priority: Medium     Hypothyroidism 09/08/2013     Priority: Medium     Positive thyroid antibodies         Hyperlipidemia LDL goal <130 09/06/2013     Priority: Medium      Past Medical History:   Diagnosis Date     Hyperlipidemia LDL goal < 130      Hypothyroidism      Lichen sclerosus of female genitalia 10/9/2015     Obesity      Past Surgical History:   Procedure Laterality Date     APPENDECTOMY       Current Outpatient Medications   Medication Sig Dispense Refill     clobetasol (TEMOVATE) 0.05 % external ointment Apply thin layer to effected area twice daily 15 g 0     NP THYROID 60 MG tablet TAKE ONE TABLET BY MOUTH ONE TIME DAILY 90 tablet 0     polyethylene glycol (MIRALAX) 17 GM/Dose powder Take 17 g (1 capful) by mouth daily 116 g 0     simvastatin (ZOCOR) 10 MG tablet Take 1 tablet (10 mg) by mouth At Bedtime 90 tablet 3       No Known Allergies     Social History     Tobacco Use     Smoking status: Never Smoker     Smokeless tobacco: Never Used   Substance Use Topics     Alcohol use: No     Family History   Problem Relation Age of Onset     C.A.D. Mother         d age 80     History   Drug Use No         Objective     /72   Pulse 78   Temp 98.2  F (36.8  C) (Oral)   Resp 16   Ht 1.575 m (5' 2\")   Wt 77.8 kg (171 lb 8 oz)   LMP  (LMP Unknown)   SpO2 97%   Breastfeeding No   BMI 31.37 kg/m      Physical Exam  GENERAL APPEARANCE: healthy, alert and no distress  HENT: ear canals and TM's normal and nose and mouth without ulcers or lesions  NECK: no adenopathy, no " asymmetry, masses, or scars and thyroid normal to palpation  RESP: lungs clear to auscultation - no rales, rhonchi or wheezes  CV: regular rate and rhythm, normal S1 S2, no S3 or S4 and no murmur, click or rub   ABDOMEN: soft, nontender, no HSM or masses and bowel sounds normal  NEURO: Normal strength and tone, sensory exam grossly normal, mentation intact and speech normal  PSYCH: mentation appears normal and affect normal/bright    Recent Labs   Lab Test 04/13/22  1022 10/06/21  1635   HGB 13.5  --      --     138   POTASSIUM 4.2 4.0   CR 0.53 0.64   A1C  --  5.8*        Diagnostics:  Orders Placed This Encounter   Procedures     REVIEW OF HEALTH MAINTENANCE PROTOCOL ORDERS     HIV Antigen Antibody Combo     Lipid panel reflex to direct LDL Non-fasting     Basic metabolic panel  (Ca, Cl, CO2, Creat, Gluc, K, Na, BUN)     Hemoglobin     Hemoglobin A1c     EKG 12-lead complete w/read - Clinics        Revised Cardiac Risk Index (RCRI):  The patient has the following serious cardiovascular risks for perioperative complications:   - No serious cardiac risks = 0 points     RCRI Interpretation: 0 points: Class I (very low risk - 0.4% complication rate)           Signed Electronically by: Dave Fernandez MD  Copy of this evaluation report is provided to requesting physician.

## 2022-09-15 NOTE — LETTER
September 20, 2022      Gary Hinkle  [address] [place] [address]  [place] [address]        Dear Gary:    We are writing to inform you of your test results.  These are all okay for surgery.     Resulted Orders   Lipid panel reflex to direct LDL Non-fasting   Result Value Ref Range    Cholesterol 188 <200 mg/dL    Triglycerides 222 (H) <150 mg/dL    Direct Measure HDL 52 >=50 mg/dL    LDL Cholesterol Calculated 92 <=100 mg/dL    Non HDL Cholesterol 136 (H) <130 mg/dL    Patient Fasting > 8hrs? No     Narrative    Cholesterol  Desirable:  <200 mg/dL    Triglycerides  Normal:  Less than 150 mg/dL  Borderline High:  150-199 mg/dL  High:  200-499 mg/dL  Very High:  Greater than or equal to 500 mg/dL    Direct Measure HDL  Female:  Greater than or equal to 50 mg/dL   Male:  Greater than or equal to 40 mg/dL    LDL Cholesterol  Desirable:  <100mg/dL  Above Desirable:  100-129 mg/dL   Borderline High:  130-159 mg/dL   High:  160-189 mg/dL   Very High:  >= 190 mg/dL    Non HDL Cholesterol  Desirable:  130 mg/dL  Above Desirable:  130-159 mg/dL  Borderline High:  160-189 mg/dL  High:  190-219 mg/dL  Very High:  Greater than or equal to 220 mg/dL   Basic metabolic panel  (Ca, Cl, CO2, Creat, Gluc, K, Na, BUN)   Result Value Ref Range    Sodium 139 133 - 144 mmol/L    Potassium 4.5 3.4 - 5.3 mmol/L    Chloride 104 94 - 109 mmol/L    Carbon Dioxide (CO2) 28 20 - 32 mmol/L    Anion Gap 7 3 - 14 mmol/L    Urea Nitrogen 15 7 - 30 mg/dL    Creatinine 0.62 0.52 - 1.04 mg/dL    Calcium 10.0 8.5 - 10.1 mg/dL    Glucose 105 (H) 70 - 99 mg/dL    GFR Estimate >90 >60 mL/min/1.73m2      Comment:      Effective December 21, 2021 eGFRcr in adults is calculated using the 2021 CKD-EPI creatinine equation which includes age and gender (Linda henderson al., [place], DOI: 10.1056/NPVObv0150705)   Hemoglobin   Result Value Ref Range    Hemoglobin 14.1 11.7 - 15.7 g/dL   Hemoglobin A1c   Result Value Ref Range    Hemoglobin A1C 5.8 (H) 0.0 - 5.6 %      Comment:       Normal <5.7%   Prediabetes 5.7-6.4%    Diabetes 6.5% or higher     Note: Adopted from ADA consensus guidelines.     If you have any questions or concerns, please call the clinic at the number listed above.     Sincerely,      Dave Fernandez MD/tg

## 2022-09-16 LAB
ANION GAP SERPL CALCULATED.3IONS-SCNC: 7 MMOL/L (ref 3–14)
BUN SERPL-MCNC: 15 MG/DL (ref 7–30)
CALCIUM SERPL-MCNC: 10 MG/DL (ref 8.5–10.1)
CHLORIDE BLD-SCNC: 104 MMOL/L (ref 94–109)
CHOLEST SERPL-MCNC: 188 MG/DL
CO2 SERPL-SCNC: 28 MMOL/L (ref 20–32)
CREAT SERPL-MCNC: 0.62 MG/DL (ref 0.52–1.04)
FASTING STATUS PATIENT QL REPORTED: NO
GFR SERPL CREATININE-BSD FRML MDRD: >90 ML/MIN/1.73M2
GLUCOSE BLD-MCNC: 105 MG/DL (ref 70–99)
HDLC SERPL-MCNC: 52 MG/DL
LDLC SERPL CALC-MCNC: 92 MG/DL
NONHDLC SERPL-MCNC: 136 MG/DL
POTASSIUM BLD-SCNC: 4.5 MMOL/L (ref 3.4–5.3)
SODIUM SERPL-SCNC: 139 MMOL/L (ref 133–144)
TRIGL SERPL-MCNC: 222 MG/DL

## 2022-09-19 LAB — HIV 1+2 AB+HIV1 P24 AG SERPL QL IA: NONREACTIVE

## 2022-09-20 ENCOUNTER — DOCUMENTATION ONLY (OUTPATIENT)
Dept: LAB | Facility: CLINIC | Age: 62
End: 2022-09-20

## 2022-09-20 NOTE — PROGRESS NOTES
Gary Hinkle has an upcoming lab appointment:    Future Appointments   Date Time Provider Department Center   9/23/2022  9:00 AM [facility] [place] [facility]   10/14/2022  8:00 AM Shaka De Luna MD [facility] [place] [facility]     Patient is scheduled for the following lab(s):     There is no order available. Please review and place either future orders or HMPO (Review of Health Maintenance Protocol Orders), as appropriate.    There are no preventive care reminders to display for this patient.  Alonzo Ken

## 2022-09-23 ENCOUNTER — LAB (OUTPATIENT)
Dept: LAB | Facility: CLINIC | Age: 62
End: 2022-09-23
Payer: COMMERCIAL

## 2022-09-23 ENCOUNTER — DOCUMENTATION ONLY (OUTPATIENT)
Dept: LAB | Facility: CLINIC | Age: 62
End: 2022-09-23

## 2022-09-23 DIAGNOSIS — M17.11 PRIMARY OSTEOARTHRITIS OF RIGHT KNEE: Primary | ICD-10-CM

## 2022-09-23 DIAGNOSIS — Z20.822 ENCOUNTER FOR LABORATORY TESTING FOR COVID-19 VIRUS: ICD-10-CM

## 2022-09-23 LAB — SARS-COV-2 RNA RESP QL NAA+PROBE: NEGATIVE

## 2022-09-23 PROCEDURE — U0003 INFECTIOUS AGENT DETECTION BY NUCLEIC ACID (DNA OR RNA); SEVERE ACUTE RESPIRATORY SYNDROME CORONAVIRUS 2 (SARS-COV-2) (CORONAVIRUS DISEASE [COVID-19]), AMPLIFIED PROBE TECHNIQUE, MAKING USE OF HIGH THROUGHPUT TECHNOLOGIES AS DESCRIBED BY CMS-2020-01-R: HCPCS

## 2022-09-23 PROCEDURE — U0005 INFEC AGEN DETEC AMPLI PROBE: HCPCS

## 2022-09-23 RX ORDER — SWAB
1 SWAB, NON-MEDICATED MISCELLANEOUS DAILY
COMMUNITY
End: 2023-04-03

## 2022-09-23 NOTE — PROGRESS NOTES
09/23/22 1611   Discharge Planning   Patient/Family Anticipates Transition to home with family   Comment, Barriers to Discharge Mandarin language   Concerns to be Addressed denies needs/concerns at this time   Living Arrangements   People in Home spouse   Type of Residence Private Residence   Is your private residence a single family home or apartment? Single family home   Number of Stairs, Within Home, Primary none  (4 steps in to residence)   Stair Railings, Within Home, Primary none   Once home, are you able to live on one level? Yes   Which level? Main Level   Which rooms are not on the main level? Bedroom;Bathroom;Living Room;Kitchen   Bathroom Shower/Tub Tub/Shower unit   Equipment Currently Used at Home none   Support System   Support Systems Spouse/Significant Other;Children   Do you have someone available to stay with you one or two nights once you are home? Yes   Medical Clearance   Date of Physical 09/15/22   Clinic Name [facility]   It is recommended that you call and check with any specialty providers before surgery to see if you need surgical clearance.  Do you see any specialty providers outside of your primary care provider? Yes   Blood   Known bleeding disorder or coagulopathy? No   Does the patient have any Taoist/cultural preferences related to blood products? No   Education   Has the patient scheduled or completed pre-op total joint education, either in class or online, in the last 12 months? No   Patient attended total joint pre-op class/received pre-op teaching  email/phone call   Relationship/Environment   Name(s) of People in Home spouse in home/ son Juan moran

## 2022-09-26 ENCOUNTER — ANESTHESIA EVENT (OUTPATIENT)
Dept: SURGERY | Facility: CLINIC | Age: 62
End: 2022-09-26
Payer: COMMERCIAL

## 2022-09-27 ENCOUNTER — TELEPHONE (OUTPATIENT)
Dept: SURGERY | Facility: CLINIC | Age: 62
End: 2022-09-27

## 2022-09-27 ENCOUNTER — APPOINTMENT (OUTPATIENT)
Dept: PHYSICAL THERAPY | Facility: CLINIC | Age: 62
End: 2022-09-27
Attending: ORTHOPAEDIC SURGERY
Payer: COMMERCIAL

## 2022-09-27 ENCOUNTER — ANESTHESIA (OUTPATIENT)
Dept: SURGERY | Facility: CLINIC | Age: 62
End: 2022-09-27
Payer: COMMERCIAL

## 2022-09-27 ENCOUNTER — APPOINTMENT (OUTPATIENT)
Dept: GENERAL RADIOLOGY | Facility: CLINIC | Age: 62
End: 2022-09-27
Attending: PHYSICIAN ASSISTANT
Payer: COMMERCIAL

## 2022-09-27 ENCOUNTER — HOSPITAL ENCOUNTER (OUTPATIENT)
Facility: CLINIC | Age: 62
Discharge: HOME HEALTH | End: 2022-09-28
Attending: ORTHOPAEDIC SURGERY | Admitting: ORTHOPAEDIC SURGERY
Payer: COMMERCIAL

## 2022-09-27 DIAGNOSIS — Z96.651 S/P TOTAL KNEE ARTHROPLASTY, RIGHT: Primary | ICD-10-CM

## 2022-09-27 PROBLEM — M17.11 PRIMARY OSTEOARTHRITIS OF RIGHT KNEE: Status: ACTIVE | Noted: 2022-09-27

## 2022-09-27 LAB — GLUCOSE BLDC GLUCOMTR-MCNC: 108 MG/DL (ref 70–99)

## 2022-09-27 PROCEDURE — 258N000003 HC RX IP 258 OP 636: Performed by: NURSE ANESTHETIST, CERTIFIED REGISTERED

## 2022-09-27 PROCEDURE — 360N000077 HC SURGERY LEVEL 4, PER MIN: Performed by: ORTHOPAEDIC SURGERY

## 2022-09-27 PROCEDURE — 250N000011 HC RX IP 250 OP 636: Performed by: NURSE ANESTHETIST, CERTIFIED REGISTERED

## 2022-09-27 PROCEDURE — 250N000011 HC RX IP 250 OP 636: Performed by: ORTHOPAEDIC SURGERY

## 2022-09-27 PROCEDURE — C1776 JOINT DEVICE (IMPLANTABLE): HCPCS | Performed by: ORTHOPAEDIC SURGERY

## 2022-09-27 PROCEDURE — 250N000009 HC RX 250: Performed by: ORTHOPAEDIC SURGERY

## 2022-09-27 PROCEDURE — 97161 PT EVAL LOW COMPLEX 20 MIN: CPT | Mod: GP | Performed by: PHYSICAL THERAPIST

## 2022-09-27 PROCEDURE — 250N000009 HC RX 250: Performed by: NURSE ANESTHETIST, CERTIFIED REGISTERED

## 2022-09-27 PROCEDURE — 370N000017 HC ANESTHESIA TECHNICAL FEE, PER MIN: Performed by: ORTHOPAEDIC SURGERY

## 2022-09-27 PROCEDURE — 27447 TOTAL KNEE ARTHROPLASTY: CPT | Mod: RT | Performed by: ORTHOPAEDIC SURGERY

## 2022-09-27 PROCEDURE — 82962 GLUCOSE BLOOD TEST: CPT

## 2022-09-27 PROCEDURE — 250N000011 HC RX IP 250 OP 636: Performed by: PHYSICIAN ASSISTANT

## 2022-09-27 PROCEDURE — 999N000065 XR KNEE PORT RIGHT 1/2 VIEWS: Mod: RT

## 2022-09-27 PROCEDURE — 258N000003 HC RX IP 258 OP 636: Performed by: PHYSICIAN ASSISTANT

## 2022-09-27 PROCEDURE — 250N000013 HC RX MED GY IP 250 OP 250 PS 637: Performed by: PHYSICIAN ASSISTANT

## 2022-09-27 PROCEDURE — 271N000001 HC OR GENERAL SUPPLY NON-STERILE: Performed by: ORTHOPAEDIC SURGERY

## 2022-09-27 PROCEDURE — 710N000009 HC RECOVERY PHASE 1, LEVEL 1, PER MIN: Performed by: ORTHOPAEDIC SURGERY

## 2022-09-27 PROCEDURE — 250N000013 HC RX MED GY IP 250 OP 250 PS 637: Performed by: NURSE ANESTHETIST, CERTIFIED REGISTERED

## 2022-09-27 PROCEDURE — 27447 TOTAL KNEE ARTHROPLASTY: CPT | Mod: AS | Performed by: PHYSICIAN ASSISTANT

## 2022-09-27 PROCEDURE — 250N000013 HC RX MED GY IP 250 OP 250 PS 637: Performed by: ORTHOPAEDIC SURGERY

## 2022-09-27 PROCEDURE — 999N000141 HC STATISTIC PRE-PROCEDURE NURSING ASSESSMENT: Performed by: ORTHOPAEDIC SURGERY

## 2022-09-27 PROCEDURE — C1713 ANCHOR/SCREW BN/BN,TIS/BN: HCPCS | Performed by: ORTHOPAEDIC SURGERY

## 2022-09-27 PROCEDURE — 97530 THERAPEUTIC ACTIVITIES: CPT | Mod: GP | Performed by: PHYSICAL THERAPIST

## 2022-09-27 PROCEDURE — 272N000001 HC OR GENERAL SUPPLY STERILE: Performed by: ORTHOPAEDIC SURGERY

## 2022-09-27 DEVICE — IMPLANTABLE DEVICE: Type: IMPLANTABLE DEVICE | Site: KNEE | Status: FUNCTIONAL

## 2022-09-27 DEVICE — BONE CEMENT DEPUY FAST SET 20GM CMW2: Type: IMPLANTABLE DEVICE | Site: KNEE | Status: FUNCTIONAL

## 2022-09-27 DEVICE — IMP BASEPLATE TIBIAL STRK TRIATHLN KNEE SZ 3 5536-B-300: Type: IMPLANTABLE DEVICE | Site: KNEE | Status: FUNCTIONAL

## 2022-09-27 DEVICE — IMP COMP PATELLA HOWM ASYM TRI 32X10MM 5551-L-320: Type: IMPLANTABLE DEVICE | Site: KNEE | Status: FUNCTIONAL

## 2022-09-27 RX ORDER — LIDOCAINE 40 MG/G
CREAM TOPICAL
Status: DISCONTINUED | OUTPATIENT
Start: 2022-09-27 | End: 2022-09-28 | Stop reason: HOSPADM

## 2022-09-27 RX ORDER — SODIUM CHLORIDE, SODIUM LACTATE, POTASSIUM CHLORIDE, CALCIUM CHLORIDE 600; 310; 30; 20 MG/100ML; MG/100ML; MG/100ML; MG/100ML
INJECTION, SOLUTION INTRAVENOUS CONTINUOUS
Status: DISCONTINUED | OUTPATIENT
Start: 2022-09-27 | End: 2022-09-27 | Stop reason: HOSPADM

## 2022-09-27 RX ORDER — ONDANSETRON 2 MG/ML
INJECTION INTRAMUSCULAR; INTRAVENOUS PRN
Status: DISCONTINUED | OUTPATIENT
Start: 2022-09-27 | End: 2022-09-27

## 2022-09-27 RX ORDER — ACETAMINOPHEN 325 MG/1
650 TABLET ORAL EVERY 4 HOURS PRN
Qty: 100 TABLET | Refills: 0 | Status: SHIPPED | OUTPATIENT
Start: 2022-09-27

## 2022-09-27 RX ORDER — MAGNESIUM SULFATE HEPTAHYDRATE 40 MG/ML
2 INJECTION, SOLUTION INTRAVENOUS ONCE
Status: COMPLETED | OUTPATIENT
Start: 2022-09-27 | End: 2022-09-27

## 2022-09-27 RX ORDER — SIMVASTATIN 10 MG
10 TABLET ORAL AT BEDTIME
Status: DISCONTINUED | OUTPATIENT
Start: 2022-09-27 | End: 2022-09-28 | Stop reason: HOSPADM

## 2022-09-27 RX ORDER — DEXAMETHASONE SODIUM PHOSPHATE 4 MG/ML
INJECTION, SOLUTION INTRA-ARTICULAR; INTRALESIONAL; INTRAMUSCULAR; INTRAVENOUS; SOFT TISSUE PRN
Status: DISCONTINUED | OUTPATIENT
Start: 2022-09-27 | End: 2022-09-27

## 2022-09-27 RX ORDER — HYDROMORPHONE HCL IN WATER/PF 6 MG/30 ML
0.4 PATIENT CONTROLLED ANALGESIA SYRINGE INTRAVENOUS EVERY 5 MIN PRN
Status: DISCONTINUED | OUTPATIENT
Start: 2022-09-27 | End: 2022-09-27 | Stop reason: HOSPADM

## 2022-09-27 RX ORDER — NALOXONE HYDROCHLORIDE 0.4 MG/ML
0.2 INJECTION, SOLUTION INTRAMUSCULAR; INTRAVENOUS; SUBCUTANEOUS
Status: DISCONTINUED | OUTPATIENT
Start: 2022-09-27 | End: 2022-09-28 | Stop reason: HOSPADM

## 2022-09-27 RX ORDER — SODIUM CHLORIDE, SODIUM LACTATE, POTASSIUM CHLORIDE, CALCIUM CHLORIDE 600; 310; 30; 20 MG/100ML; MG/100ML; MG/100ML; MG/100ML
INJECTION, SOLUTION INTRAVENOUS CONTINUOUS
Status: DISCONTINUED | OUTPATIENT
Start: 2022-09-27 | End: 2022-09-28 | Stop reason: HOSPADM

## 2022-09-27 RX ORDER — BUPIVACAINE HYDROCHLORIDE 5 MG/ML
INJECTION, SOLUTION PERINEURAL PRN
Status: DISCONTINUED | OUTPATIENT
Start: 2022-09-27 | End: 2022-09-27 | Stop reason: HOSPADM

## 2022-09-27 RX ORDER — CEFAZOLIN SODIUM/WATER 2 G/20 ML
2 SYRINGE (ML) INTRAVENOUS SEE ADMIN INSTRUCTIONS
Status: DISCONTINUED | OUTPATIENT
Start: 2022-09-27 | End: 2022-09-27 | Stop reason: HOSPADM

## 2022-09-27 RX ORDER — HYDROMORPHONE HCL IN WATER/PF 6 MG/30 ML
0.2 PATIENT CONTROLLED ANALGESIA SYRINGE INTRAVENOUS
Status: DISCONTINUED | OUTPATIENT
Start: 2022-09-27 | End: 2022-09-28 | Stop reason: HOSPADM

## 2022-09-27 RX ORDER — PROCHLORPERAZINE MALEATE 5 MG
10 TABLET ORAL EVERY 6 HOURS PRN
Status: DISCONTINUED | OUTPATIENT
Start: 2022-09-27 | End: 2022-09-28 | Stop reason: HOSPADM

## 2022-09-27 RX ORDER — AMOXICILLIN 250 MG
1-2 CAPSULE ORAL 2 TIMES DAILY
Qty: 30 TABLET | Refills: 0 | Status: SHIPPED | OUTPATIENT
Start: 2022-09-27 | End: 2022-11-11

## 2022-09-27 RX ORDER — TRANEXAMIC ACID 650 MG/1
1950 TABLET ORAL ONCE
Status: COMPLETED | OUTPATIENT
Start: 2022-09-27 | End: 2022-09-27

## 2022-09-27 RX ORDER — GLYCOPYRROLATE 0.2 MG/ML
INJECTION, SOLUTION INTRAMUSCULAR; INTRAVENOUS PRN
Status: DISCONTINUED | OUTPATIENT
Start: 2022-09-27 | End: 2022-09-27

## 2022-09-27 RX ORDER — HYDROMORPHONE HCL IN WATER/PF 6 MG/30 ML
0.4 PATIENT CONTROLLED ANALGESIA SYRINGE INTRAVENOUS
Status: DISCONTINUED | OUTPATIENT
Start: 2022-09-27 | End: 2022-09-28 | Stop reason: HOSPADM

## 2022-09-27 RX ORDER — POLYETHYLENE GLYCOL 3350 17 G/17G
17 POWDER, FOR SOLUTION ORAL DAILY
Status: DISCONTINUED | OUTPATIENT
Start: 2022-09-28 | End: 2022-09-28 | Stop reason: HOSPADM

## 2022-09-27 RX ORDER — VANCOMYCIN HYDROCHLORIDE 1 G/20ML
INJECTION, POWDER, LYOPHILIZED, FOR SOLUTION INTRAVENOUS PRN
Status: DISCONTINUED | OUTPATIENT
Start: 2022-09-27 | End: 2022-09-27 | Stop reason: HOSPADM

## 2022-09-27 RX ORDER — BUPIVACAINE HYDROCHLORIDE 7.5 MG/ML
INJECTION, SOLUTION INTRASPINAL PRN
Status: DISCONTINUED | OUTPATIENT
Start: 2022-09-27 | End: 2022-09-27

## 2022-09-27 RX ORDER — POLYETHYLENE GLYCOL 3350 17 G/17G
17 POWDER, FOR SOLUTION ORAL DAILY
Status: DISCONTINUED | OUTPATIENT
Start: 2022-09-27 | End: 2022-09-27

## 2022-09-27 RX ORDER — ONDANSETRON 4 MG/1
4 TABLET, ORALLY DISINTEGRATING ORAL EVERY 6 HOURS PRN
Status: DISCONTINUED | OUTPATIENT
Start: 2022-09-27 | End: 2022-09-28 | Stop reason: HOSPADM

## 2022-09-27 RX ORDER — LIDOCAINE HYDROCHLORIDE 10 MG/ML
INJECTION, SOLUTION INFILTRATION; PERINEURAL PRN
Status: DISCONTINUED | OUTPATIENT
Start: 2022-09-27 | End: 2022-09-27

## 2022-09-27 RX ORDER — CEFAZOLIN SODIUM 1 G/3ML
1 INJECTION, POWDER, FOR SOLUTION INTRAMUSCULAR; INTRAVENOUS EVERY 8 HOURS
Status: COMPLETED | OUTPATIENT
Start: 2022-09-27 | End: 2022-09-28

## 2022-09-27 RX ORDER — ONDANSETRON 2 MG/ML
4 INJECTION INTRAMUSCULAR; INTRAVENOUS EVERY 30 MIN PRN
Status: DISCONTINUED | OUTPATIENT
Start: 2022-09-27 | End: 2022-09-27 | Stop reason: HOSPADM

## 2022-09-27 RX ORDER — BISACODYL 10 MG
10 SUPPOSITORY, RECTAL RECTAL DAILY PRN
Status: DISCONTINUED | OUTPATIENT
Start: 2022-09-27 | End: 2022-09-28 | Stop reason: HOSPADM

## 2022-09-27 RX ORDER — OXYCODONE HYDROCHLORIDE 5 MG/1
5 TABLET ORAL EVERY 4 HOURS PRN
Status: DISCONTINUED | OUTPATIENT
Start: 2022-09-27 | End: 2022-09-27 | Stop reason: HOSPADM

## 2022-09-27 RX ORDER — CEFAZOLIN SODIUM/WATER 2 G/20 ML
2 SYRINGE (ML) INTRAVENOUS
Status: COMPLETED | OUTPATIENT
Start: 2022-09-27 | End: 2022-09-27

## 2022-09-27 RX ORDER — THYROID 30 MG/1
60 TABLET ORAL DAILY
Status: DISCONTINUED | OUTPATIENT
Start: 2022-09-27 | End: 2022-09-28 | Stop reason: HOSPADM

## 2022-09-27 RX ORDER — ONDANSETRON 2 MG/ML
4 INJECTION INTRAMUSCULAR; INTRAVENOUS EVERY 6 HOURS PRN
Status: DISCONTINUED | OUTPATIENT
Start: 2022-09-27 | End: 2022-09-28 | Stop reason: HOSPADM

## 2022-09-27 RX ORDER — CLOBETASOL PROPIONATE 0.5 MG/G
OINTMENT TOPICAL 2 TIMES DAILY PRN
Status: DISCONTINUED | OUTPATIENT
Start: 2022-09-27 | End: 2022-09-28 | Stop reason: HOSPADM

## 2022-09-27 RX ORDER — FENTANYL CITRATE 50 UG/ML
50 INJECTION, SOLUTION INTRAMUSCULAR; INTRAVENOUS EVERY 5 MIN PRN
Status: DISCONTINUED | OUTPATIENT
Start: 2022-09-27 | End: 2022-09-27 | Stop reason: HOSPADM

## 2022-09-27 RX ORDER — NALOXONE HYDROCHLORIDE 0.4 MG/ML
0.4 INJECTION, SOLUTION INTRAMUSCULAR; INTRAVENOUS; SUBCUTANEOUS
Status: DISCONTINUED | OUTPATIENT
Start: 2022-09-27 | End: 2022-09-28 | Stop reason: HOSPADM

## 2022-09-27 RX ORDER — ONDANSETRON 4 MG/1
4 TABLET, ORALLY DISINTEGRATING ORAL EVERY 30 MIN PRN
Status: DISCONTINUED | OUTPATIENT
Start: 2022-09-27 | End: 2022-09-27 | Stop reason: HOSPADM

## 2022-09-27 RX ORDER — KETAMINE HYDROCHLORIDE 10 MG/ML
INJECTION INTRAMUSCULAR; INTRAVENOUS PRN
Status: DISCONTINUED | OUTPATIENT
Start: 2022-09-27 | End: 2022-09-27

## 2022-09-27 RX ORDER — HYDROXYZINE HYDROCHLORIDE 25 MG/1
25 TABLET, FILM COATED ORAL EVERY 6 HOURS PRN
Status: DISCONTINUED | OUTPATIENT
Start: 2022-09-27 | End: 2022-09-27 | Stop reason: HOSPADM

## 2022-09-27 RX ORDER — OXYCODONE HYDROCHLORIDE 5 MG/1
5 TABLET ORAL EVERY 4 HOURS PRN
Status: DISCONTINUED | OUTPATIENT
Start: 2022-09-27 | End: 2022-09-28 | Stop reason: HOSPADM

## 2022-09-27 RX ORDER — OXYCODONE HYDROCHLORIDE 5 MG/1
5-10 TABLET ORAL EVERY 4 HOURS PRN
Qty: 16 TABLET | Refills: 0 | Status: SHIPPED | OUTPATIENT
Start: 2022-09-27 | End: 2022-11-11

## 2022-09-27 RX ORDER — OXYCODONE HYDROCHLORIDE 5 MG/1
10 TABLET ORAL EVERY 4 HOURS PRN
Status: DISCONTINUED | OUTPATIENT
Start: 2022-09-27 | End: 2022-09-28 | Stop reason: HOSPADM

## 2022-09-27 RX ORDER — AMOXICILLIN 250 MG
1 CAPSULE ORAL 2 TIMES DAILY
Status: DISCONTINUED | OUTPATIENT
Start: 2022-09-27 | End: 2022-09-28 | Stop reason: HOSPADM

## 2022-09-27 RX ORDER — ACETAMINOPHEN 325 MG/1
650 TABLET ORAL EVERY 4 HOURS PRN
Status: DISCONTINUED | OUTPATIENT
Start: 2022-09-30 | End: 2022-09-28 | Stop reason: HOSPADM

## 2022-09-27 RX ORDER — ACETAMINOPHEN 325 MG/1
975 TABLET ORAL ONCE
Status: COMPLETED | OUTPATIENT
Start: 2022-09-27 | End: 2022-09-27

## 2022-09-27 RX ORDER — ROPIVACAINE HYDROCHLORIDE 5 MG/ML
INJECTION, SOLUTION EPIDURAL; INFILTRATION; PERINEURAL PRN
Status: DISCONTINUED | OUTPATIENT
Start: 2022-09-27 | End: 2022-09-27

## 2022-09-27 RX ORDER — ACETAMINOPHEN 325 MG/1
975 TABLET ORAL EVERY 8 HOURS
Status: DISCONTINUED | OUTPATIENT
Start: 2022-09-27 | End: 2022-09-28 | Stop reason: HOSPADM

## 2022-09-27 RX ORDER — PROPOFOL 10 MG/ML
INJECTION, EMULSION INTRAVENOUS CONTINUOUS PRN
Status: DISCONTINUED | OUTPATIENT
Start: 2022-09-27 | End: 2022-09-27

## 2022-09-27 RX ORDER — LIDOCAINE 40 MG/G
CREAM TOPICAL
Status: DISCONTINUED | OUTPATIENT
Start: 2022-09-27 | End: 2022-09-27 | Stop reason: HOSPADM

## 2022-09-27 RX ADMIN — PROPOFOL 75 MCG/KG/MIN: 10 INJECTION, EMULSION INTRAVENOUS at 10:47

## 2022-09-27 RX ADMIN — FENTANYL CITRATE 50 MCG: 50 INJECTION, SOLUTION INTRAMUSCULAR; INTRAVENOUS at 13:05

## 2022-09-27 RX ADMIN — MIDAZOLAM 2 MG: 1 INJECTION INTRAMUSCULAR; INTRAVENOUS at 10:39

## 2022-09-27 RX ADMIN — FENTANYL CITRATE 50 MCG: 50 INJECTION, SOLUTION INTRAMUSCULAR; INTRAVENOUS at 12:53

## 2022-09-27 RX ADMIN — FENTANYL CITRATE 50 MCG: 50 INJECTION, SOLUTION INTRAMUSCULAR; INTRAVENOUS at 13:42

## 2022-09-27 RX ADMIN — KETAMINE HYDROCHLORIDE 25 MG: 10 INJECTION, SOLUTION INTRAMUSCULAR; INTRAVENOUS at 10:49

## 2022-09-27 RX ADMIN — ASPIRIN 325 MG: 325 TABLET ORAL at 21:53

## 2022-09-27 RX ADMIN — SODIUM CHLORIDE, POTASSIUM CHLORIDE, SODIUM LACTATE AND CALCIUM CHLORIDE: 600; 310; 30; 20 INJECTION, SOLUTION INTRAVENOUS at 16:15

## 2022-09-27 RX ADMIN — GLYCOPYRROLATE 0.2 MG: 0.2 INJECTION, SOLUTION INTRAMUSCULAR; INTRAVENOUS at 10:55

## 2022-09-27 RX ADMIN — SENNOSIDES AND DOCUSATE SODIUM 1 TABLET: 50; 8.6 TABLET ORAL at 19:49

## 2022-09-27 RX ADMIN — DEXAMETHASONE SODIUM PHOSPHATE 8 MG: 4 INJECTION, SOLUTION INTRA-ARTICULAR; INTRALESIONAL; INTRAMUSCULAR; INTRAVENOUS; SOFT TISSUE at 10:50

## 2022-09-27 RX ADMIN — Medication 2 G: at 12:35

## 2022-09-27 RX ADMIN — ROPIVACAINE HYDROCHLORIDE 20 ML: 5 INJECTION, SOLUTION EPIDURAL; INFILTRATION; PERINEURAL at 13:04

## 2022-09-27 RX ADMIN — LIDOCAINE HYDROCHLORIDE 0.2 ML: 10 INJECTION, SOLUTION EPIDURAL; INFILTRATION; INTRACAUDAL; PERINEURAL at 09:36

## 2022-09-27 RX ADMIN — HYDROMORPHONE HYDROCHLORIDE 0.2 MG: 0.2 INJECTION, SOLUTION INTRAMUSCULAR; INTRAVENOUS; SUBCUTANEOUS at 19:49

## 2022-09-27 RX ADMIN — CHOLECALCIFEROL (VITAMIN D3) 10 MCG (400 UNIT) TABLET 10 MCG: at 16:15

## 2022-09-27 RX ADMIN — Medication 30 MCG: at 13:04

## 2022-09-27 RX ADMIN — OXYCODONE HYDROCHLORIDE 5 MG: 5 TABLET ORAL at 21:53

## 2022-09-27 RX ADMIN — ACETAMINOPHEN 975 MG: 325 TABLET, FILM COATED ORAL at 16:15

## 2022-09-27 RX ADMIN — MAGNESIUM SULFATE HEPTAHYDRATE 2 G: 40 INJECTION, SOLUTION INTRAVENOUS at 10:50

## 2022-09-27 RX ADMIN — KETAMINE HYDROCHLORIDE 25 MG: 10 INJECTION, SOLUTION INTRAMUSCULAR; INTRAVENOUS at 10:47

## 2022-09-27 RX ADMIN — OXYCODONE HYDROCHLORIDE 5 MG: 5 TABLET ORAL at 14:06

## 2022-09-27 RX ADMIN — SIMVASTATIN 10 MG: 10 TABLET, FILM COATED ORAL at 21:53

## 2022-09-27 RX ADMIN — BUPIVACAINE HYDROCHLORIDE IN DEXTROSE 1.7 ML: 7.5 INJECTION, SOLUTION SUBARACHNOID at 10:44

## 2022-09-27 RX ADMIN — FENTANYL CITRATE 50 MCG: 50 INJECTION, SOLUTION INTRAMUSCULAR; INTRAVENOUS at 13:18

## 2022-09-27 RX ADMIN — LIDOCAINE HYDROCHLORIDE 50 MG: 10 INJECTION, SOLUTION INFILTRATION; PERINEURAL at 10:44

## 2022-09-27 RX ADMIN — ONDANSETRON 4 MG: 2 INJECTION INTRAMUSCULAR; INTRAVENOUS at 10:47

## 2022-09-27 RX ADMIN — HYDROXYZINE HYDROCHLORIDE 25 MG: 25 TABLET ORAL at 14:06

## 2022-09-27 RX ADMIN — TRANEXAMIC ACID 1950 MG: 650 TABLET ORAL at 08:59

## 2022-09-27 RX ADMIN — CEFAZOLIN 1 G: 1 INJECTION, POWDER, FOR SOLUTION INTRAMUSCULAR; INTRAVENOUS at 19:50

## 2022-09-27 RX ADMIN — Medication 2 G: at 10:35

## 2022-09-27 RX ADMIN — THYROID, PORCINE 60 MG: 30 TABLET ORAL at 16:15

## 2022-09-27 RX ADMIN — SODIUM CHLORIDE, POTASSIUM CHLORIDE, SODIUM LACTATE AND CALCIUM CHLORIDE: 600; 310; 30; 20 INJECTION, SOLUTION INTRAVENOUS at 09:36

## 2022-09-27 RX ADMIN — ACETAMINOPHEN 975 MG: 325 TABLET, FILM COATED ORAL at 08:58

## 2022-09-27 ASSESSMENT — ACTIVITIES OF DAILY LIVING (ADL)
ADLS_ACUITY_SCORE: 20
ADLS_ACUITY_SCORE: 22
ADLS_ACUITY_SCORE: 21
ADLS_ACUITY_SCORE: 21

## 2022-09-27 NOTE — OP NOTE
DATE OF SERVICE:  9/27/2022    PREOPERATIVE DIAGNOSIS:  Primary osteoarthritis, right knee.    POSTOPERATIVE DIAGNOSIS: Primary osteoarthritis, right knee.    OPERATION PERFORMED: Hybrid tricompartmental total knee arthroplasty ( press-fit tibial and femoral components for the medial, lateral compartments, and cemented patellofemoral component), right knee.    ATTENDING SURGEON: Shaka De Luna M.D., M.S.    ASSISTANT(S): Saul Espinoza PA-C   The PA's assistance was medically necessary given the technical complexity of the case; with assistance with patient positioning, retraction and knee joint exposure, limb manipulation for femoral and tibial osteotomies, assistance with implant placement, trial and final arthroplasty knee implant reduction, and wound closure.     ANESTHESIA:  Spinal with MAC, in the supine position.    IV FLUIDS:  750 mL LR.    EBL:  50mL .    URINE OUTPUT: no stone    TOURNIQUET TIME: 49 minutes at 250mmHg.    IMPLANTS / GRAFTS:        #3 posterior stabilized [place] Triathlon press-fit beaded femur,      #3 Gina Triathlon Tritanium press-fit tibial tray     #32 Gina Triathlon X3 asymmetric medial off-set patella      9mm X3 PS polyethylene tibial liner    LATERAL RELEASE:   None required - good tracking.    APPROACH:   Medial Parapatellar    COMPLICATIONS:  NONE    ANTIBIOTICS:  Cefazolin 2 gm intravenously prior to tourniquet inflation and 2gm at release and closure.    Tranexamic Acid: 1950mg oral prior to procedure in preop.    SPECIMENS: none    DRAINS: none    FINDINGS: advanced tricompartmental degenerative osteoarthritis, eburnated bone on bone changes, with marginal osteophytes. Inflammed synovium, effusion. Good bone quality.    INDICATIONS FOR PROCEDURE: Gary Hinkle is a pleasant 61 year old female with ongoing knee pain.Xrays showed advanced degenerative arthrosis.The patient has unfortunately failed nonoperative attempts at knee pain relief with ongoing symptomatic arthropathy,  including physical therapy, activity modification, weight loss, NSAIDS, and injections (cortisone and viscosupplementation). Symptoms have been interfering with activities of daily living and quality of life.  The risk and benefit analysis of knee arthroplasty was explained to include but not be limited to wear, loosening, infection, bleeding, pain, scar, implant dislocation, fracture, failure to relieve symptoms, thromboembolic disease, neurovascular damage with possible temporary or permanent nerve damage, leg length inequality, need for further surgery, risks of anesthesia and death.  Despite these risks, as a quality of life decision, the patient elected to proceed.    And with informed and written consent the patient was taken to the operating room.    PROCEDURE AND FINDINGS:    The patient was identified in the preoperative holding area. The correct surgical procedure and site was confirmed with the patient. Again, the risks of surgery were reviewed. The patient elected to proceed understanding the risks. Written informed consent was obtained. The correct surgical site was marked with an indelible marker by myself, Shaka De Luna MD, the surgeon.     The patient was then taken to the operating room and placed supine on the operating table. After adequate anesthesia was obtained, a non-sterile tourniquet was placed to the upper thigh. All bony prominences were well padded. The arms were well positioned and padded to be comfortable. The right knee and lower extremity was prepped and draped in the usual sterile fashion.     A time-out was completed confirming the correct patient, the correct surgery and surgical site, positioning, the availability of implants, administration of prophylactic antibiotics, and known allergies by all surgical staff.    A midline incision was made and carried down through the peritenon over the patella tendon.  A medial-based arthrotomy was extended proximally using a medial  parapatellar approach and distally using a subperiosteal medial collateral ligament elevation.  Accessible anterior menisci were incised.  The knee was flexed, and canal entry into both femur and tibia allowed a 5 degree 8 mm distal femoral cut across the distal aspect of the medial and lateral femoral condyles, and a 3 degree 4 mm medial-based tibial cut across the medial and lateral tibial plateau.    Minimally, or less,  invasive techniques with limited incisons and reduced sized cutting blocks and minimal patellar eversion were utilized.    Attention was then turned to the femur.  This was sized to prevent notching and externally rotated to the epicondylar axis.  Necessary anterior and posterior as well as Chamfer cuts were made medial and lateral.   A trial femoral component was placed after removing remaining menisci.  The flexion and extension gap balancing was assured using an 9mm poly as well as a 11 mm poly.  The tibial alignment pins were removed following flexion/extension balancing.  Tibial component size was selected and punched, externally rotating tibial component to the junction of the middle and medial thirds of the tibial tubercle.    Attention was then turned to the patella and the 23 mm patella was recessed to a 13 mm thick patella to accommodate a medial off-set component which was drilled.  A complete synovectomy was performed.    Patellar tracking was assured using a thicker poly insert. No lateral release was needed. After confirmation of such, 1 bag of cement was mixed at the back table.   Beginning with the tibia and then the femur, these components were press-fit into place. This was then followed by the patella, which was cemented in place.  Excess cement was removed from patella component.  The knee was taken to extension.  The tourniquet was released, and a second dose of Ancef was administered.    The wound was copiously irrigated during the cement cure with dilute betadine solution  as well as antibiotic saline. A local joint block was administered through the wound into the surrounding tissues. The actual polyethylene, liner which allowed full extension and good flexion, was inserted. The wound was irrigated again. One gram of vancomycin powder was sprinkled into the knee. The medial arthrotomy, medial collateral lateral elevation, and deep fascia of the vastus medialis obliquus was closed with 0 Ethibond.  0 Vicryl and 3-0 monocryl were used in the peritenon and subdermal tissue respectively.  A 3-0 monocryl subcuticular suture was used to maintain good skin eversion and apposition.  Dermabond was placed over the closed incision. A water-proof sterile dressing (Aquacel Ag)  was applied over adaptec gauze.    The patient was then taken to recovery in stable condition.    The postoperative plan will be weight bearing as tolerated, knee arthroplasty protocol with range of motion to full immediately, pharmacologic DVT prophylaxis for 4 weeks, and antibiotics for 23 hours.  We look forward to following the patient through the perioperative time period.    Shaka De Luna M.D., M.S.  Dept. of Orthopaedic Surgery  [facility]

## 2022-09-27 NOTE — ANESTHESIA PROCEDURE NOTES
Intrathecal injection Procedure Note    Pre-Procedure   Staff -        CRNA: Ruben Mckinnon APRN CRNA       Performed By: CRNA       Location: OR       Pre-Anesthestic Checklist: patient identified, IV checked, risks and benefits discussed, informed consent, monitors and equipment checked, pre-op evaluation, at physician/surgeon's request and post-op pain management  Timeout:       Correct Patient: Yes        Correct Procedure: Yes        Correct Site: Yes        Correct Position: Yes   Procedure Documentation  Procedure: intrathecal injection       Patient Position: sitting       Patient Prep/Sterile Barriers: sterile gloves, mask, patient draped       Skin prep: Betadine       Insertion Site: L4-5. (midline approach).       Needle Gauge: 25.        Needle Length (Inches): 3.5        Spinal Needle Type: Pencan       Introducer used       # of attempts: 1 and  # of redirects:     Assessment/Narrative         Paresthesias: No.       Sensory Level: T6       CSF fluid: clear.       Opening pressure was cmH2O while  Sitting.

## 2022-09-27 NOTE — LETTER
Transition Communication Hand-off for Care Transitions to Next Level of Care Provider    Name: Gary Hinkle  : 1960  MRN #: [id number]  Primary Care Provider: Opal Goff     Primary Clinic: [address] [place] [address] [place] [id number]     Reason for Hospitalization:  Primary osteoarthritis of right knee [M17.11]  S/P TKR (total knee replacement), right [Z96.651]  Admit Date/Time: 2022  8:35 AM  Discharge Date: 22  Payor Source: Payor: BLUE PLUS / Plan: BLUE [employer]US [employer] / Product Type: HMO /          Reason for Communication Hand-off Referral: Fragility    Discharge Plan:  Discharge Plan:    Flowsheet Row Most Recent Value   Disposition Comments Referral for Home Care-PT. Pt will return home with family support at time of discharge        Discharge Needs Assessment:  Needs    Flowsheet Row Most Recent Value   Equipment Currently Used at Home none   # of Referrals Placed by CTS External Care Coordination, Homecare        Follow-up plan:    Future Appointments   Date Time Provider Department Center   10/14/2022  8:00 AM Shaka De Luna MD [facility]       Any outstanding tests or procedures:            Supplies     Future Labs/Procedures    [facility]     Process Instructions:    By signing this order, the Authorizing Provider is attesting that they have completed a face-to-face evaluation on the patient to determine their need for this equipment in the last 60 days. A new face-to-face evaluation is required each time  A new prescription for one of the specified items is ordered.   If an additional provider completed the evaluation, please indicate their name below.     **As of 2018, an order requisition and face sheet will print for all DME orders. Please give printed order and face sheet to patient if not obtaining product from [facility] DME closet.     Comments:    DME Documentation: Describe the reason for need to support medical necessity: Impaired gait status post knee  surgery. I, the undersigned, certify that the above prescribed supplies are medically necessary for this patient and is both reasonable and necessary in reference to accepted standards of medical practice in the treatment of this patient's condition and is not prescribed as a convenience.    [facility]     Process Instructions:    By signing this order, the Authorizing Provider is attesting that they have completed a face-to-face evaluation on the patient to determine their need for this equipment in the last 60 days. A new face-to-face evaluation is required each time  A new prescription for one of the specified items is ordered.   If an additional provider completed the evaluation, please indicate their name below.     **As of 2018, an order requisition and face sheet will print for all DME orders. Please give printed order and face sheet to patient if not obtaining product from [facility] DME closet.     Comments:    DME Documentation: Describe the reason for need to support medical necessity: Impaired gait status post knee surgery. I, the undersigned, certify that the above prescribed supplies are medically necessary for this patient and is both reasonable and necessary in reference to accepted standards of medical practice in the treatment of this patient's condition and is not prescribed as a convenience.    [facility]     Process Instructions:    By signing this order, the Authorizing Provider is attesting that they have completed a face-to-face evaluation on the patient to determine their need for this equipment in the last 60 days. A new face-to-face evaluation is required each time  A new prescription for one of the specified items is ordered.   If an additional provider completed the evaluation, please indicate their name below.     **As of 2018, an order requisition and face sheet will print for all DME orders. Please give printed order and face sheet to patient if not obtaining product from  [facility] DME closet.     Comments:    DME Documentation: Describe the reason for need to support medical necessity: Impaired gait status post knee surgery. I, the undersigned, certify that the above prescribed supplies are medically necessary for this patient and is both reasonable and necessary in reference to accepted standards of medical practice in the treatment of this patient's condition and is not prescribed as a convenience.          EMILIA Phan    AVS/Discharge Summary is the source of truth; this is a helpful guide for improved communication of patient story

## 2022-09-27 NOTE — ANESTHESIA PROCEDURE NOTES
Femoral (distal femoral in the adductor canal) Procedure Note    Pre-Procedure   Staff -        CRNA: Lauri Petit APRN CRNA       Performed By: CRNA       Location: post-op       Procedure Start/Stop Times: 9/27/2022 1:00 PM and 9/27/2022 1:04 PM       Pre-Anesthestic Checklist: patient identified, IV checked, site marked, risks and benefits discussed, informed consent, monitors and equipment checked, pre-op evaluation, at physician/surgeon's request and post-op pain management  Timeout:       Correct Patient: Yes        Correct Procedure: Yes        Correct Site: Yes        Correct Position: Yes        Correct Laterality: Yes        Site Marked: Yes  Procedure Documentation  Procedure: Femoral (distal femoral in the adductor canal)       Laterality: right       Patient Position: supine       Skin prep: Chloraprep       Needle Gauge: 22.        Needle Length (Inches): 4        Ultrasound guided       1. Ultrasound was used to identify targeted nerve, plexus, vascular marker, or fascial plane and place a needle adjacent to it in real-time.       2. Ultrasound was used to visualize the spread of anesthetic in close proximity to the above referenced structure.       3. A permanent image is entered into the patient's record.       4. The visualized anatomic structures appeared normal.       5. There were no apparent abnormal pathologic findings.    Assessment/Narrative         The placement was negative for: blood aspirated and painful injection       Paresthesias: No.       Bolus given via needle..        Secured via.        Insertion/Infusion Method: Single Shot       Complications: none    Medication(s) Administered   Medication Administration Time: 9/27/2022 1:00 PM

## 2022-09-27 NOTE — PROGRESS NOTES
"[place] [facility] [place]    Patient admitted to room 2315 at approximately 1445 via cart from surgery. Patient was accompanied by transport tech and .     Verbal SBAR report received from Lynn prior to patient arrival.     Patient trasferred to bed via air wei. Patient alert and oriented X 3. Pain is controlled with current analgesics.  Medication(s) being used: Fentynal, Atarax, and oxycodone used in PACU.  . Admission vital signs: Blood pressure 134/68, pulse 66, temperature 97.9  F (36.6  C), temperature source Axillary, resp. rate 22, height 1.575 m (5' 2\"), weight 77.6 kg (171 lb), SpO2 98 %, not currently breastfeeding. Patient and  was oriented to plan of care, call light, bed controls, tv, telephone, bathroom and visiting hours.     Risk Assessment    The following safety risks were identified during admission: fall. Yellow risk band applied: YES.     Skin Initial Assessment    This writer admitted this patient and completed a full skin assessment and Zachary score in the Adult PCS flowsheet. Appropriate interventions initiated as needed.     Secondary skin check completed by DEVAUGHN Castro.         Education    Patient has a [place] to Observation order: Yes  Observation education completed and documented: Yes      Meeta Resendiz RN    "

## 2022-09-27 NOTE — PROGRESS NOTES
"DATE & TIME: 9/27 DAYS    Ax0x 4  Mandarin speaking- pt son in room to help communicate   Mobility: Ax2 w/ walker + gaitbelt   Regular diet  -pt did not eat dinner due to being too tired.   Voiding: bladder scanned pt for 89.   Last BM: 9/26   PIV: R- saline locked.    Pain: pt rates pain a 1-3 and doesn't want pain meds.    Skin: R knee incision  VS: /74 (BP Location: Left arm)   Pulse 65   Temp 97.6  F (36.4  C) (Oral)   Resp 13   Ht 1.575 m (5' 2\")   Wt 77.6 kg (171 lb)   SpO2 96%   BMI 31.28 kg/m      Tele: no  O2 status: 1L while sleeping.   Pt is very tired.     Gloria Gatica RN on 9/27/2022 at 6:30 PM      "

## 2022-09-27 NOTE — INTERVAL H&P NOTE
"I have reviewed the surgical (or preoperative) H&P that is linked to this encounter, and examined the patient. There are no significant changes    Clinical Conditions Present on Arrival:  Clinically Significant Risk Factors Present on Admission                   # Obesity: Estimated body mass index is 31.28 kg/m  as calculated from the following:    Height as of this encounter: 1.575 m (5' 2\").    Weight as of this encounter: 77.6 kg (171 lb).       "

## 2022-09-27 NOTE — PROGRESS NOTES
[facility]      OUTPATIENT PHYSICAL THERAPY EVALUATION  PLAN OF TREATMENT FOR OUTPATIENT REHABILITATION  (COMPLETE FOR INITIAL CLAIMS ONLY)  Patient's Last Name, First Name, M.I.  YOB: 1960  Gary Hinkle                           Provider's Name  [facility] Medical Record No.  [id number]                               Onset Date:  09/27/22   Start of Care Date:  09/27/22      Type:     _X_PT   ___OT   ___SLP Medical Diagnosis:  R TKA                        PT Diagnosis:  impaired functional mobility   Visits from SOC:  1   _________________________________________________________________________________  Plan of Treatment/Functional Goals    Planned Interventions: bed mobility training, gait training, home exercise program, patient/family education, ROM (range of motion), stair training, strengthening, transfer training     Goals: See Physical Therapy Goals on Care Plan in [facility] electronic health record.    Therapy Frequency: Daily  Predicted Duration of Therapy Intervention: 09/30/22  _________________________________________________________________________________    I CERTIFY THE NEED FOR THESE SERVICES FURNISHED UNDER        THIS PLAN OF TREATMENT AND WHILE UNDER MY CARE     (Physician co-signature of this document indicates review and certification of the therapy plan).                Certification date from: 09/27/22, Certification date to: 09/30/22 Jhonathan Espinoza PA  Referring Physician: Jhonathan Espinoza PA            Initial Assessment        See Physical Therapy evaluation dated 09/27/22 in Epic electronic health record.

## 2022-09-27 NOTE — ANESTHESIA CARE TRANSFER NOTE
Patient: Gary Hinkle    Procedure: Procedure(s):  ARTHROPLASTY, RIGHT KNEE, TOTAL       Diagnosis: Primary osteoarthritis of right knee [M17.11]  Diagnosis Additional Information: No value filed.    Anesthesia Type:   Spinal     Note:    Oropharynx: oropharynx clear of all foreign objects and spontaneously breathing  Level of Consciousness: awake  Oxygen Supplementation: room air    Independent Airway: airway patency satisfactory and stable  Dentition: dentition unchanged  Vital Signs Stable: post-procedure vital signs reviewed and stable  Report to RN Given: handoff report given  Patient transferred to: PACU    Handoff Report: Identifed the Patient, Identified the Reponsible Provider, Reviewed the pertinent medical history, Discussed the surgical course, Reviewed Intra-OP anesthesia mangement and issues during anesthesia, Set expectations for post-procedure period and Allowed opportunity for questions and acknowledgement of understanding      Vitals:  Vitals Value Taken Time   /77 09/27/22 1300   Temp     Pulse 55 09/27/22 1314   Resp 21 09/27/22 1314   SpO2 96 % 09/27/22 1314   Vitals shown include unvalidated device data.    Electronically Signed By: ROSHNI Pearson CRNA  September 27, 2022  1:15 PM

## 2022-09-27 NOTE — PROGRESS NOTES
Author called  services at [phone] and discussed declination of  services.  The declination form is discussed in [place] with patient and patient agrees.   [place] [id number]  Dania Spangler RN  9/27/2022

## 2022-09-27 NOTE — ANESTHESIA PREPROCEDURE EVALUATION
Anesthesia Pre-Procedure Evaluation    Patient: Gary Hinkle   MRN: [id number] : 1960        Procedure : Procedure(s):  ARTHROPLASTY, RIGHT KNEE, TOTAL          Past Medical History:   Diagnosis Date     Hyperlipidemia LDL goal < 130      Hypothyroidism      Lichen sclerosus of female genitalia 10/9/2015     Obesity       Past Surgical History:   Procedure Laterality Date     APPENDECTOMY        No Known Allergies   Social History     Tobacco Use     Smoking status: Never Smoker     Smokeless tobacco: Never Used   Substance Use Topics     Alcohol use: No      Wt Readings from Last 1 Encounters:   22 77.6 kg (171 lb)        Anesthesia Evaluation   Pt has had prior anesthetic. Type: General.    No history of anesthetic complications       ROS/MED HX  ENT/Pulmonary:  - neg pulmonary ROS     Neurologic:  - neg neurologic ROS     Cardiovascular: Comment: Last NTG use was last week.  NO ER admissions for chest pain. Denies CP today.  Takes NTG at rest and with activity.  Takes NTG 1-2 x per month    (+) Dyslipidemia --CAD angina-at rest. --Previous cardiac testing   Echo: Date: Results:    Stress Test: Date:  Results:   FINAL CONCLUSIONS    Normal Stress Echocardiogram.    Poor exercise tolerance, achieving 7.0 METs and 92% of max predicted heart rate.    No diagnostic ECG evidence of ischemia.     ECG Reviewed: Date: 2022 Results:  Sinus Rhythm   -Left atrial enlargement.    - Negative precordial T-waves.   BORDERLINE ECG. normal  read by RNL, no prior ECG to compare  Cath:  Date: Results:      METS/Exercise Tolerance: >4 METS    Hematologic:  - neg hematologic  ROS     Musculoskeletal:   (+) arthritis,     GI/Hepatic:  - neg GI/hepatic ROS     Renal/Genitourinary:  - neg Renal ROS     Endo:     (+) thyroid problem, hypothyroidism, Obesity,     Psychiatric/Substance Use:  - neg psychiatric ROS     Infectious Disease:  - neg infectious disease ROS     Malignancy:  - neg malignancy ROS     Other:  - neg  other ROS          Physical Exam    Airway  airway exam normal      Mallampati: II   TM distance: > 3 FB   Neck ROM: full   Mouth opening: > 3 cm    Respiratory Devices and Support         Dental  no notable dental history         Cardiovascular   cardiovascular exam normal          Pulmonary   pulmonary exam normal        breath sounds clear to auscultation           OUTSIDE LABS:  CBC:   Lab Results   Component Value Date    WBC 4.5 04/13/2022    WBC 4.0 12/06/2013    HGB 14.1 09/15/2022    HGB 13.5 04/13/2022    HCT 40.8 04/13/2022    HCT 39.7 12/06/2013     04/13/2022     12/06/2013     BMP:   Lab Results   Component Value Date     09/15/2022     04/13/2022    POTASSIUM 4.5 09/15/2022    POTASSIUM 4.2 04/13/2022    CHLORIDE 104 09/15/2022    CHLORIDE 107 04/13/2022    CO2 28 09/15/2022    CO2 28 04/13/2022    BUN 15 09/15/2022    BUN 14 04/13/2022    CR 0.62 09/15/2022    CR 0.53 04/13/2022     (H) 09/27/2022     (H) 09/15/2022     COAGS: No results found for: PTT, INR, FIBR  POC: No results found for: BGM, HCG, HCGS  HEPATIC:   Lab Results   Component Value Date    ALBUMIN 3.9 04/13/2022    PROTTOTAL 7.6 04/13/2022    ALT 41 04/13/2022    AST 24 04/13/2022    ALKPHOS 99 04/13/2022    BILITOTAL 0.6 04/13/2022     OTHER:   Lab Results   Component Value Date    A1C 5.8 (H) 09/15/2022    NATALIYA 10.0 09/15/2022    TSH 2.88 02/01/2022    T4 0.28 (L) 08/30/2013    SED 7 04/13/2022       Anesthesia Plan    ASA Status:  3   NPO Status:  NPO Appropriate    Anesthesia Type: Spinal.              Consents    Anesthesia Plan(s) and associated risks, benefits, and realistic alternatives discussed. Questions answered and patient/representative(s) expressed understanding.     - Discussed: Risks, Benefits and Alternatives for BOTH SEDATION and the PROCEDURE were discussed     - Discussed with:  Patient      - Extended Intubation/Ventilatory Support Discussed: No.      - Patient is DNR/DNI  Status: No    Use of blood products discussed: No .     Postoperative Care    Pain management: Peripheral nerve block (Single Shot), Neuraxial analgesia, IV analgesics.   PONV prophylaxis: Dexamethasone or Solumedrol, Ondansetron (or other 5HT-3), Background Propofol Infusion     Comments:    Other Comments: Consent discussed via Juan (son) , as  services are unavailable via contracted provider.  Pt and son in agreement with plan.            ROSHNI Pearson CRNA

## 2022-09-27 NOTE — PROGRESS NOTES
Skin affirmation note     Admitting nurse completed full skin assessment, Zachary score and Zachary interventions. This writer agrees with the initial skin assessment findings.     Gloria Gatica RN on 9/27/2022 at 3:24 PM

## 2022-09-27 NOTE — TELEPHONE ENCOUNTER
[facility] same day surgery called. Pt Arrived at [place] and is on their way to [place]. Appointment letter from us said to arrive at [facility]. However the surgery is scheduled for here.

## 2022-09-27 NOTE — ANESTHESIA POSTPROCEDURE EVALUATION
Patient: Gary Hinkle    Procedure: Procedure(s):  ARTHROPLASTY, RIGHT KNEE, TOTAL       Anesthesia Type:  Spinal    Note:  Disposition: Inpatient   Postop Pain Control: Uneventful            Sign Out: Well controlled pain   PONV: No   Neuro/Psych: Uneventful            Sign Out: Acceptable/Baseline neuro status   Airway/Respiratory: Uneventful            Sign Out: Acceptable/Baseline resp. status   CV/Hemodynamics: Uneventful            Sign Out: Acceptable CV status; No obvious hypovolemia; No obvious fluid overload   Other NRE: NONE   DID A NON-ROUTINE EVENT OCCUR? No           Last vitals:  Vitals Value Taken Time   /73 09/27/22 1315   Temp     Pulse 54 09/27/22 1321   Resp 16 09/27/22 1321   SpO2 95 % 09/27/22 1321   Vitals shown include unvalidated device data.    Electronically Signed By: ROSHNI Pearson CRNA  September 27, 2022  1:22 PM

## 2022-09-27 NOTE — PROGRESS NOTES
PT eval- anticipate home with  and home PT, she will need a youth height walker from here at discharge, she is very sleepy and not keeping eyes opened while sitting EOB and reports feeling very light headed, did not stand, has knee immobilizer in the room,  was present.     09/27/22 1500   Quick Adds   Type of Visit Initial PT Evaluation       Present yes   Language Mandarin   Living Environment   People in Home spouse   Current Living Arrangements house   Home Accessibility stairs to enter home   Number of Stairs, Main Entrance 5   Stair Railings, Main Entrance railing on right side (ascending)   Living Environment Comments will stay on main level   Self-Care   Equipment Currently Used at Home none   General Information   Onset of Illness/Injury or Date of Surgery 09/27/22   Referring Physician Link, ELMER Melendez   Patient/Family Therapy Goals Statement (PT) to return home with , interested in home PT   Pertinent History of Current Problem (include personal factors and/or comorbidities that impact the POC) R knee OA, s/p R TKA   Existing Precautions/Restrictions oxygen therapy device and L/min  (KI until can do  SLR)   Weight-Bearing Status - RLE weight-bearing as tolerated   General Observations KI in room   Cognition   Affect/Mental Status (Cognition) low arousal/lethargic  (just arrived to the floor from PACU, sleepy)   Follows Commands (Cognition) follows one-step commands   Pain Assessment   Patient Currently in Pain Yes, see Vital Sign flowsheet   Range of Motion (ROM)   ROM Comment AAROM R knee flexion moved 5 degrees and stated was too painful to do more   Strength (Manual Muscle Testing)   Strength (Manual Muscle Testing) Deficits observed during functional mobility;Able to perform L SLR   Strength Comments max A for R SLR   Bed Mobility   Comment, (Bed Mobility) supine to sit max A for R LE and min A HHA to pull trunk to sitting   Transfers   Comment,  (Transfers) not attempted as pt very sleepy and not able to keep eyes open sitting EOB   Clinical Impression   Criteria for Skilled Therapeutic Intervention Yes, treatment indicated   PT Diagnosis (PT) impaired functional mobility   Influenced by the following impairments pain, decreased R quad strength/contraction, decreased R knee AROM   Functional limitations due to impairments impaired bed mobility   Clinical Presentation (PT Evaluation Complexity) Stable/Uncomplicated   Clinical Presentation Rationale clinical judgement   Clinical Decision Making (Complexity) low complexity   Planned Therapy Interventions (PT) bed mobility training;gait training;home exercise program;patient/family education;ROM (range of motion);stair training;strengthening;transfer training   Anticipated Equipment Needs at Discharge (PT) walker, rolling  (will need youth height FWW for home)   Risk & Benefits of therapy have been explained evaluation/treatment results reviewed;care plan/treatment goals reviewed;risks/benefits reviewed;current/potential barriers reviewed;participants voiced agreement with care plan;patient   PT Discharge Planning   PT Discharge Recommendation (DC Rec) home with assist;home with home care physical therapy   PT Rationale for DC Rec spouse will be home to assist, will need to be able to do 5 steps to get into her home, was not able to assess gait or stairs today, will assess tomorrow   PT Brief overview of current status Ax1 for sitting EOB, pt not able to keep eyes open, light headed with sitting, did not try standing today   Plan of Care Review   Plan of Care Reviewed With patient   Therapy Certification   Start of care date 09/27/22   Certification date from 09/27/22   Certification date to 09/30/22   Medical Diagnosis R TKA   Total Evaluation Time   Total Evaluation Time (Minutes) 10   Physical Therapy Goals   PT Frequency Daily   PT Predicted Duration/Target Date for Goal Attainment 09/30/22   PT Goals  Transfers;Gait;Stairs   PT: Transfers Minimal assist;Sit to/from stand;Assistive device   PT: Gait Minimal assist;Rolling walker;50 feet   PT: Stairs Minimal assist;5 stairs;Rail on left

## 2022-09-27 NOTE — H&P
"The History and Physical on patient's chart was personally reviewed today with the patient. there have been no interval changes in patient's history since H+P performed.    History:  Gary Hinkle is a 61 year old female with ongoing right knee pain with no known injury worse the past ~2 years. Seen for the same 2/4/2022, noted to have osteoarthritis. Declined injection at the time, opted for only Physical Therapy. Returns today with continued pain, getting worse. Talked to son and want to proceed with knee replacement surgery. Continues to take tylenol.     Pain has been present for more than 10 years. Locates pain across the front of the knee and long the inner/outer aspects, aggravated with weight bearing activities such as walking, standing, twisting, stairs. Pain is a \"shooting and burning\" pain. Ok at rest unless moves around. Positive night pain.     Reports injection 12 years ago in [place], nothing recently.     Does have low back pain, 40+ years.    xrays with bone on bone osteoarthritis. Treatment options discussed, nonsurgical versus surgical. As a quality of life decision, patient elects for total knee arthroplasty.    Patient elects to proceed with planned procedure. Right total knee arthroplasty.    Risks and perceived benefits of surgery again discussed with patient. Patient's questions addressed and answered. Written informed consent obtained and reviewed. Surgical site marked with indelible marker with patient's participation after confirming site with patient.      Shaka De Luna M.D., M.S.  Dept. of Orthopaedic Surgery  [facility]    "

## 2022-09-28 ENCOUNTER — APPOINTMENT (OUTPATIENT)
Dept: PHYSICAL THERAPY | Facility: CLINIC | Age: 62
End: 2022-09-28
Attending: ORTHOPAEDIC SURGERY
Payer: COMMERCIAL

## 2022-09-28 VITALS
BODY MASS INDEX: 31.47 KG/M2 | HEIGHT: 62 IN | RESPIRATION RATE: 14 BRPM | TEMPERATURE: 97.2 F | SYSTOLIC BLOOD PRESSURE: 145 MMHG | WEIGHT: 171 LBS | HEART RATE: 75 BPM | DIASTOLIC BLOOD PRESSURE: 72 MMHG | OXYGEN SATURATION: 92 %

## 2022-09-28 DIAGNOSIS — Z96.651 S/P TKR (TOTAL KNEE REPLACEMENT), RIGHT: Primary | ICD-10-CM

## 2022-09-28 LAB
FASTING STATUS PATIENT QL REPORTED: ABNORMAL
GLUCOSE SERPL-MCNC: 152 MG/DL (ref 70–99)
HGB BLD-MCNC: 10.8 G/DL (ref 11.7–15.7)

## 2022-09-28 PROCEDURE — 97110 THERAPEUTIC EXERCISES: CPT | Mod: GP | Performed by: PHYSICAL THERAPIST

## 2022-09-28 PROCEDURE — 36415 COLL VENOUS BLD VENIPUNCTURE: CPT | Performed by: PHYSICIAN ASSISTANT

## 2022-09-28 PROCEDURE — 250N000013 HC RX MED GY IP 250 OP 250 PS 637: Performed by: PHYSICIAN ASSISTANT

## 2022-09-28 PROCEDURE — 82947 ASSAY GLUCOSE BLOOD QUANT: CPT | Performed by: ORTHOPAEDIC SURGERY

## 2022-09-28 PROCEDURE — 250N000011 HC RX IP 250 OP 636: Performed by: PHYSICIAN ASSISTANT

## 2022-09-28 PROCEDURE — 97116 GAIT TRAINING THERAPY: CPT | Mod: GP | Performed by: PHYSICAL THERAPIST

## 2022-09-28 PROCEDURE — 85018 HEMOGLOBIN: CPT | Performed by: PHYSICIAN ASSISTANT

## 2022-09-28 PROCEDURE — 99213 OFFICE O/P EST LOW 20 MIN: CPT | Performed by: PHYSICIAN ASSISTANT

## 2022-09-28 RX ADMIN — ACETAMINOPHEN 975 MG: 325 TABLET, FILM COATED ORAL at 01:55

## 2022-09-28 RX ADMIN — ACETAMINOPHEN 975 MG: 325 TABLET, FILM COATED ORAL at 09:17

## 2022-09-28 RX ADMIN — CEFAZOLIN 1 G: 1 INJECTION, POWDER, FOR SOLUTION INTRAMUSCULAR; INTRAVENOUS at 04:55

## 2022-09-28 RX ADMIN — CHOLECALCIFEROL (VITAMIN D3) 10 MCG (400 UNIT) TABLET 10 MCG: at 09:17

## 2022-09-28 RX ADMIN — THYROID, PORCINE 60 MG: 30 TABLET ORAL at 09:17

## 2022-09-28 RX ADMIN — OXYCODONE HYDROCHLORIDE 10 MG: 5 TABLET ORAL at 13:14

## 2022-09-28 RX ADMIN — SENNOSIDES AND DOCUSATE SODIUM 1 TABLET: 50; 8.6 TABLET ORAL at 09:17

## 2022-09-28 RX ADMIN — OXYCODONE HYDROCHLORIDE 5 MG: 5 TABLET ORAL at 04:55

## 2022-09-28 ASSESSMENT — ACTIVITIES OF DAILY LIVING (ADL)
ADLS_ACUITY_SCORE: 21
DEPENDENT_IADLS:: INDEPENDENT
ADLS_ACUITY_SCORE: 21
ADLS_ACUITY_SCORE: 21

## 2022-09-28 NOTE — PLAN OF CARE
Patient vital signs are at baseline: Yes  Patient able to ambulate as they were prior to admission or with assist devices provided by therapies during their stay:  No,  Reason:  Has only been up to bedside commode.  Patient MUST void prior to discharge:  Yes  Patient able to tolerate oral intake:  Yes  Pain has adequate pain control using Oral analgesics:  Yes  Does patient have an identified :  Yes  Has goal D/C date and time been discussed with patient:  Yes    Lyudmila Mccain RN on 9/28/2022 at 6:14 AM

## 2022-09-28 NOTE — PROGRESS NOTES
Patient vital signs are at baseline: Yes  Patient able to ambulate as they were prior to admission or with assist devices provided by therapies during their stay:  No,  Reason: uses walker  Patient MUST void prior to discharge:  Yes  Patient able to tolerate oral intake:  Yes  Pain has adequate pain control using Oral analgesics:  Yes  Does patient have an identified :  Yes  Has goal D/C date and time been discussed with patient:  Yes

## 2022-09-28 NOTE — PROGRESS NOTES
[facility] Medicine Progress Note  Date of Service: 09/28/2022    Assessment & Plan   Gary Hinkle is a 61 year old female who presented on 9/27/2022 for scheduled Procedure(s):  ARTHROPLASTY, RIGHT KNEE, TOTAL by Shaka De Luna MD and is being followed by the hospital medicine service for co-management of acute and/or chronic perioperative medical problems.    S/p Procedure(s):  ARTHROPLASTY, RIGHT KNEE, TOTAL   1 Day Post-Op   Hg 10.8.  Pain well managed.    - pain control, wound cares, physical therapy, occupational therapy and DVT prophylaxis per orthopedic surgery service    Hyperlipidemia  Chronic.  - continue home simvastatin    Hypothyroidism  Chronic.   - continue home thyroid ([place]) tablet    DVT Prophylaxis: as per orthopedic surgery service - aspirin 325 mg daily  Code Status: Full Code    Lines: peripheral   Bertrand catheter: none    Discussion: Medically, the patient appears to be recovering appropriately since surgery. Vital signs stable. No medical barriers to discharge at this time.    Disposition: Anticipate discharge today, per ortho if pain and mobility goal are met     Attestation:  I have reviewed today's vital signs, notes, medications, labs and imaging.  Total time: 15 minutes    Jaki Moreno PA-C   [facility] Medicine    Supervising Physician Dr. Greg Patterson.     Interval History   Patient was seen today with assistance of iPad .  She has some pain though feels it is tolerable. She did well with PT and met her mobility goals. She has a good appetite. Voiding regularly. Passing gas. No BM.     Denies headache, lightheadedness, dizziness, fever, chills, chest pain, palpitations, shortness of breath, cough, abdominal pain, nausea, vomiting, numbness or tingling in bilateral lower extremities.         Physical Exam   Temp:  [97  F (36.1  C)-98.3  F (36.8  C)] 97.2  F (36.2  C)  Pulse:  [53-75] 75  Resp:  [13-24] 14  BP: (111-145)/(62-83)  145/72  SpO2:  [91 %-100 %] 92 %    Weights:   Vitals:    09/27/22 0844   Weight: 77.6 kg (171 lb)    Body mass index is 31.28 kg/m .    General: Appears well, laying down comfortably in bed. Alert and oriented. Pleasant and cooperative. No distress. Non-toxic. Appears stated age.   CV: Regular rate, normal rhythm. Pedal pulses are 2+ bilaterally. No lower extremity edema.  Respiratory: No accessory muscle usage. Clear to auscultation bilaterally. No wheezes, crackles or rhonchi.   GI: Bowel sounds normoactive in all quadrants. Soft, non-tender, non-distended.  Skin: Warm, dry, intact. Did not assess incision, ACE wrap in place.   Musculoskeletal: Muscle tone is appropriate. Moves all extremities freely with limited range of motion right lower extremity due to pain and bandage.  Neuro: Sensation to light touch of bilateral lower extremities is grossly intact.     Data   Recent Labs   Lab 09/28/22 0457 09/27/22 0923   HGB 10.8*  --    * 108*       Recent Labs   Lab 09/28/22 0457 09/27/22  0923   * 108*        Unresulted Labs Ordered in the Past 30 Days of this Admission     No orders found for last 31 day(s).           Imaging  Recent Results (from the past 24 hour(s))   XR Knee Port Right 1/2 Views    Narrative    XR KNEE PORT RIGHT 1/2 VIEWS 9/27/2022 1:59 PM    HISTORY: Post-Op Total Knee    COMPARISON: None.      Impression    IMPRESSION: Postoperative changes of a recent right total knee  arthroplasty with patellar resurfacing. No fracture.    ESSENCE BERMUDEZ MD         SYSTEM ID:  [id number]     I reviewed all new labs and imaging results over the last 24 hours. I personally reviewed no images or EKG's today.    Medications     lactated ringers 100 mL/hr at 09/27/22 1615       acetaminophen  975 mg Oral Q8H     aspirin  325 mg Oral Daily     cholecalciferol  10 mcg Oral Daily     polyethylene glycol  17 g Oral Daily     senna-docusate  1 tablet Oral BID     simvastatin  10 mg Oral At Bedtime      sodium chloride (PF)  3 mL Intracatheter Q8H     thyroid  60 mg Oral Daily

## 2022-09-28 NOTE — CONSULTS
Care Management Note:     Care Management team received referral from Ortho team to assist pt with Home Care services post surgical services.     Per IDT rounds, EMR review, and discussion with PT/OT staff, it has been determined that pt will discharge to home with Home Care for  PT services.     JOSE ANTONIO met at bedside with pt , with assistance from  services via Ipad. Introduced self/role and recommendations for Home Care for physical therapy. Pt verbalized agreement for home care. Stated she plans to return home with her spouse and son.     Provided Pt a list of agency available in the [facility]. Informed [facility] is able to accept her referral, her insurance and provide PT to her home. Pt verbalized acceptance.      Pt stated she has no other discharge needs. States her son will assist with transportation.     SW informed Ortho PA to writer home care orders.  [facility] (phone: [phone] Fax: [phone])    EMILIA Phan  [facility] [phone]   [facility]  [phone]

## 2022-09-28 NOTE — PROGRESS NOTES
[place] [facility] DISCHARGE NOTE    Patient discharged to home at 2:39 PM via wheel chair. Accompanied by spouse and son and staff. Discharge instructions reviewed with patient, spouse and son, opportunity offered to ask questions. Prescriptions filled and sent with patient upon discharge. All belongings sent with patient.    Kailyn Coates RN

## 2022-09-28 NOTE — PLAN OF CARE
OT: Per discussion with physical therapy no IP OT needs identified. Will discontinue therapy orders at this time.

## 2022-09-28 NOTE — PROGRESS NOTES
Patient vital signs are at baseline: Yes  Patient able to ambulate as they were prior to admission or with assist devices provided by therapies during their stay:  No,  Reason:  Uses walker after surgery  Patient MUST void prior to discharge:  Yes  Patient able to tolerate oral intake:  Yes  Pain has adequate pain control using Oral analgesics:  Yes  Does patient have an identified :  Yes  Has goal D/C date and time been discussed with patient:  Yes

## 2022-09-29 ENCOUNTER — TELEPHONE (OUTPATIENT)
Dept: FAMILY MEDICINE | Facility: CLINIC | Age: 62
End: 2022-09-29

## 2022-09-29 ENCOUNTER — TELEPHONE (OUTPATIENT)
Dept: SURGERY | Facility: CLINIC | Age: 62
End: 2022-09-29

## 2022-09-29 DIAGNOSIS — Z96.651 STATUS POST TOTAL RIGHT KNEE REPLACEMENT: ICD-10-CM

## 2022-09-29 DIAGNOSIS — Z98.890 S/P RIGHT KNEE ARTHROSCOPY: Primary | ICD-10-CM

## 2022-09-29 NOTE — TELEPHONE ENCOUNTER
Gregoria called and informed.  She will see if Dr. Shaka De Luna would be able to follow patient.    Latanya Patiño,

## 2022-09-29 NOTE — TELEPHONE ENCOUNTER
[facility] Call Center    Phone Message    May a detailed message be left on voicemail: yes     Reason for Call: Other: pt's HomeCare orders need to be signed ASAP they are suppose to start tomorrow      Action Taken: Message routed to:  [facility] ortho    Travel Screening: Not Applicable

## 2022-09-29 NOTE — TELEPHONE ENCOUNTER
Reason for Call:  Other     Detailed comments: Nurse would like to know if provider will follow patient will in [facility].    Phone Number   Greg[facility] [phone]         Best Time:     Can we leave a detailed message on this number? YES    Call taken on 9/29/2022 at 10:11 AM by Corrine June

## 2022-09-30 ENCOUNTER — MEDICAL CORRESPONDENCE (OUTPATIENT)
Dept: HEALTH INFORMATION MANAGEMENT | Facility: CLINIC | Age: 62
End: 2022-09-30

## 2022-09-30 NOTE — TELEPHONE ENCOUNTER
[facility] Stated she spoke with Dr Goff and she will not follow the pt.  Will fax new orders. Dr damon to follow only until PT is completed. Will fwd him the message.    Umu MAGANA RN Specialty Triage 9/30/2022 12:05 PM

## 2022-09-30 NOTE — TELEPHONE ENCOUNTER
Jostin calling for [employer] [facility] requesting orders for     PT/OT/SN      Please call Jostin back at [phone]      Alicja Calvillo RN

## 2022-09-30 NOTE — TELEPHONE ENCOUNTER
Gregoria from [facility] calling again stating that they will not be able to provide physical therapy for this patient without a call for orders from Dr. De Luna.     [phone]    Monalisa Loving MS ATC

## 2022-10-03 ENCOUNTER — TELEPHONE (OUTPATIENT)
Dept: ORTHOPEDICS | Facility: CLINIC | Age: 62
End: 2022-10-03

## 2022-10-05 ENCOUNTER — MEDICAL CORRESPONDENCE (OUTPATIENT)
Dept: HEALTH INFORMATION MANAGEMENT | Facility: CLINIC | Age: 62
End: 2022-10-05

## 2022-10-05 ENCOUNTER — TELEPHONE (OUTPATIENT)
Dept: ORTHOPEDICS | Facility: CLINIC | Age: 62
End: 2022-10-05

## 2022-10-05 NOTE — TELEPHONE ENCOUNTER
Faxed back a signed order to [employer] at [phone]. Fax confirmed and sent to abstracting.  Vibha Obrien Certified Medical Assistant

## 2022-10-07 ENCOUNTER — MEDICAL CORRESPONDENCE (OUTPATIENT)
Dept: HEALTH INFORMATION MANAGEMENT | Facility: CLINIC | Age: 62
End: 2022-10-07

## 2022-10-11 ENCOUNTER — TELEPHONE (OUTPATIENT)
Dept: SURGERY | Facility: CLINIC | Age: 62
End: 2022-10-11

## 2022-10-11 NOTE — PROGRESS NOTES
Chief Complaint   Patient presents with     Surgical Followup     Surgery on 9/27/2022  Total Right Knee Patient has been doing PT  2 x a week and she also does it at home     Today's encounter utilized a language specific  (via iPad) to obtain the HPI, PAST MEDICAL/SURGICAL history, social history, family history, medications and allergies and review of systems; in addition to perform physical examination; review pertinent imaging studies; discuss exam findings and assessment; and go over treatment plan.      SURGERY: Total knee arthroplasty, right knee ([facility])  DATE OF SURGERY: 9/27/2022      HISTORY OF PRESENT ILLNESS: Gary Hinkle is a 61 year old female seen for postoperative evaluation of right total knee arthroplasty. It has been 2 weeks since surgery. Returns today stating doing ok, getting better. Denies any wound problems. Denies numbness, tingling, or weakness in the affected extremity. Denies fevers chills night sweats. Continues to take aspirin for anti-coagulation for deep vein thrombosis prophylaxis. Use of pain medications has been tylenol only. Has been doing Physical Therapy. Concerns today include: none.    She's not been elevating.    Present symptoms: moderate pain, moderate swelling.    Pain severity: 6/10      Past medical history:   Past Medical History:   Diagnosis Date     Hyperlipidemia LDL goal < 130      Hypothyroidism      Lichen sclerosus of female genitalia 10/9/2015     Obesity      Patient Active Problem List    Diagnosis Date Noted     Primary osteoarthritis of right knee 09/27/2022     Priority: Medium     Obesity 04/13/2022     Priority: Medium     Lichen sclerosus of female genitalia 10/09/2015     Priority: Medium     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     Priority: Medium     Hypothyroidism 09/08/2013     Priority: Medium     Positive thyroid antibodies         Hyperlipidemia LDL goal <130 09/06/2013     Priority: Medium       Past Surgical History:   Past  Surgical History:   Procedure Laterality Date     APPENDECTOMY       ARTHROPLASTY KNEE Right 9/27/2022    Procedure: ARTHROPLASTY, RIGHT KNEE, TOTAL;  Surgeon: Shaka De Luna MD;  Location: [facility] OR       Medications:   Current Outpatient Medications:      acetaminophen (TYLENOL) 325 MG tablet, Take 2 tablets (650 mg) by mouth every 4 hours as needed for other (mild pain), Disp: 100 tablet, Rfl: 0     aspirin (ASA) 325 MG EC tablet, Take 1 tablet (325 mg) by mouth daily, Disp: 30 tablet, Rfl: 0     clobetasol (TEMOVATE) 0.05 % external ointment, Apply thin layer to effected area twice daily, Disp: 15 g, Rfl: 0     NP THYROID 60 MG tablet, TAKE ONE TABLET BY MOUTH ONE TIME DAILY, Disp: 90 tablet, Rfl: 0     oxyCODONE (ROXICODONE) 5 MG tablet, Take 1-2 tablets (5-10 mg) by mouth every 4 hours as needed for moderate to severe pain, Disp: 16 tablet, Rfl: 0     polyethylene glycol (MIRALAX) 17 GM/Dose powder, Take 17 g (1 capful) by mouth daily, Disp: 116 g, Rfl: 0     senna-docusate (SENOKOT-S/PERICOLACE) 8.6-50 MG tablet, Take 1-2 tablets by mouth 2 times daily Take while on oral narcotics to prevent or treat constipation., Disp: 30 tablet, Rfl: 0     simvastatin (ZOCOR) 10 MG tablet, Take 1 tablet (10 mg) by mouth At Bedtime, Disp: 90 tablet, Rfl: 3     vitamin D3 (CHOLECALCIFEROL) 10 MCG (400 UNIT) capsule, Take 1 capsule by mouth daily, Disp: , Rfl:     Allergies: No Known Allergies      REVIEW OF SYSTEMS:  CONSTITUTIONAL:NEGATIVE for fever, chills, night sweats, change in weight  INTEGUMENTARY/SKIN: NEGATIVE for worrisome rashes, moles or lesions  MUSCULOSKELETAL:See HPI above  NEURO: NEGATIVE for weakness, dizziness or paresthesias  CARDIOVASCULAR: negative for chest pain, shortness of breath    PHYSICAL EXAM:  LMP  (LMP Unknown)    GENERAL APPEARANCE: healthy, alert, no distress  SKIN: no suspicious lesions or rashes  NEURO: Normal strength and tone, mentation intact and speech normal  PSYCH:  mentation appears  normal and affect normal  RESPIRATORY: No increased work of breathing.    BILATERAL LOWER EXTREMITIES:  Gait: in wheelchair.    Intact sensation deep peroneal nerve, superficial peroneal nerve, med/lat tibial nerve, sural nerve, saphenous nerve  Intact EHL, EDL, TA, FHL, GS, quadriceps hamstrings and hip flexors  Toes warm and well perfused, brisk capillary refill. Palpable 2+ dp pulses.  Bilateral calf soft and nttp or squeeze.  Edema: 1+, pedal edema.    right  KNEE EXAM:    Surgical dressing in place.  Incision: healing well. No drainage. Dry.  Skin: intact, diffuse resolving ecchymosis, no erythema  3x5cm eschar from swelling blister lateral knee away from incision.  ROM: 7 extension to 65 flexion  Effusion: moderate   Tender: diffuse      X-RAY:  3 views right knee from 10/14/2022 were reviewed in clinic today. No obvious fractures or dislocations. Total knee arthroplasty components in place without evidence of failure or loosening.      Impression: Gary Hinkle is a 61 year old female status post Total knee arthroplasty, right knee, doing well, 2 weeks out from surgery.      Plan:   * reviewed xrays with patient, showing total knee arthroplasty implants.  * continue DVT prophylaxis for a total of 4 weeks postoperative  * continue with knee range of motion exercises, focusing on extension; more stiffness than typical at 2 weeks.  * wean off all pain medications as tolerated  * xrays next visit: 2 views of the knee  * return to clinic 4 weeks   * Physical Therapy, home exercise program daily.  * all questions addressed and answered prior to discharge from clinic today to patient's satisfaction.  * patient will need longterm prophylactic antibiotics use with dental procedures or other invasive procedures (2 g amoxicilin or 450mg ([id number]) clindamycin) 1 hour prior to dental procedures.    * KOOS Jr/Promis Knee score: NOT  to be done at next visit; NOT completed during todays visit; to be completed at 12 weeks and 12  months postoperative.      Shaka De Luna M.D., M.S.  Dept. of Orthopaedic Surgery  [facility]

## 2022-10-11 NOTE — TELEPHONE ENCOUNTER
[facility] Call Center    Phone Message    May a detailed message be left on voicemail: yes     Reason for Call: Other: Home care nurse called and said they did initial eval and no further OT is needed     Action Taken: Other: ortho     Travel Screening: Not Applicable

## 2022-10-14 ENCOUNTER — OFFICE VISIT (OUTPATIENT)
Dept: ORTHOPEDICS | Facility: CLINIC | Age: 62
End: 2022-10-14
Payer: COMMERCIAL

## 2022-10-14 ENCOUNTER — ANCILLARY PROCEDURE (OUTPATIENT)
Dept: GENERAL RADIOLOGY | Facility: CLINIC | Age: 62
End: 2022-10-14
Attending: ORTHOPAEDIC SURGERY
Payer: COMMERCIAL

## 2022-10-14 DIAGNOSIS — Z96.651 STATUS POST TOTAL RIGHT KNEE REPLACEMENT: Primary | ICD-10-CM

## 2022-10-14 DIAGNOSIS — Z96.651 STATUS POST TOTAL RIGHT KNEE REPLACEMENT: ICD-10-CM

## 2022-10-14 PROCEDURE — 73562 X-RAY EXAM OF KNEE 3: CPT | Mod: TC | Performed by: RADIOLOGY

## 2022-10-14 PROCEDURE — 99024 POSTOP FOLLOW-UP VISIT: CPT | Performed by: ORTHOPAEDIC SURGERY

## 2022-10-14 ASSESSMENT — PAIN SCALES - GENERAL: PAINLEVEL: SEVERE PAIN (6)

## 2022-10-21 ENCOUNTER — TELEPHONE (OUTPATIENT)
Dept: SURGERY | Facility: CLINIC | Age: 62
End: 2022-10-21

## 2022-10-21 ENCOUNTER — MEDICAL CORRESPONDENCE (OUTPATIENT)
Dept: HEALTH INFORMATION MANAGEMENT | Facility: CLINIC | Age: 62
End: 2022-10-21

## 2022-10-21 NOTE — TELEPHONE ENCOUNTER
Left detailed message stating per MD note on 10/14 plan to continue with physical therapy    Sophia VILLALOBOS RN Specialty Triage 10/21/2022 2:15 PM

## 2022-10-21 NOTE — TELEPHONE ENCOUNTER
Reason for Call:  Other call back    Detailed comments: Needs verbal auth on continuing physical therapy services    Phone Number Patient can be reached at: Cell number on file:    Telephone Information:   [phone]    Best Time: ANYTIME    Can we leave a detailed message on this number? YES    Call taken on 10/21/2022 at 1:36 PM by Enrique Ward

## 2022-10-26 NOTE — TELEPHONE ENCOUNTER
Called and spoke to Patt. She will fax the orders to [facility] and we will get them signed and faxed back.  Vibha Obrien Certified Medical Assistant

## 2022-10-26 NOTE — TELEPHONE ENCOUNTER
Faxed back signed orders to [employer] at [phone]. Fax confirmed and sent to abstracting.   Vibha Obrien Certified Medical Assistant

## 2022-11-04 ENCOUNTER — TELEPHONE (OUTPATIENT)
Dept: ORTHOPEDICS | Facility: CLINIC | Age: 62
End: 2022-11-04

## 2022-11-04 NOTE — TELEPHONE ENCOUNTER
Faxed back signed physical therapy reevaluation to [facility] at [phone]. Fax confirmed and sent to abstracting.  Vibha Obrien Certified Medical Assistant

## 2022-11-11 ENCOUNTER — ANCILLARY PROCEDURE (OUTPATIENT)
Dept: GENERAL RADIOLOGY | Facility: CLINIC | Age: 62
End: 2022-11-11
Attending: ORTHOPAEDIC SURGERY
Payer: COMMERCIAL

## 2022-11-11 ENCOUNTER — OFFICE VISIT (OUTPATIENT)
Dept: ORTHOPEDICS | Facility: CLINIC | Age: 62
End: 2022-11-11
Payer: COMMERCIAL

## 2022-11-11 VITALS
WEIGHT: 169 LBS | SYSTOLIC BLOOD PRESSURE: 122 MMHG | HEART RATE: 76 BPM | BODY MASS INDEX: 31.1 KG/M2 | DIASTOLIC BLOOD PRESSURE: 76 MMHG | HEIGHT: 62 IN

## 2022-11-11 DIAGNOSIS — Z96.651 STATUS POST TOTAL RIGHT KNEE REPLACEMENT: Primary | ICD-10-CM

## 2022-11-11 DIAGNOSIS — Z96.651 STATUS POST TOTAL RIGHT KNEE REPLACEMENT: ICD-10-CM

## 2022-11-11 PROCEDURE — 99024 POSTOP FOLLOW-UP VISIT: CPT | Performed by: ORTHOPAEDIC SURGERY

## 2022-11-11 PROCEDURE — 73560 X-RAY EXAM OF KNEE 1 OR 2: CPT | Mod: TC | Performed by: RADIOLOGY

## 2022-11-11 ASSESSMENT — PAIN SCALES - GENERAL: PAINLEVEL: MILD PAIN (3)

## 2022-11-11 NOTE — LETTER
11/11/2022         RE: Gary Hinkle  [address] [place] [id number]        Dear Colleague,    Thank you for referring your patient, Gary Hinkle, to the [facility]. Please see a copy of my visit note below.    Chief Complaint   Patient presents with     Right Knee - Total Joint Post-op     DOS 9/27/22, 6.5 wk s/p. Patient presents to clinic using a walker as an aid. Patient notes her knee is doing ok, a little bit of pain. She will walk without the walker sometimes at home. She has been doing physical therapy at home and is here for a follow up to determine if she should go to outpatient physical therapy. She has been doing her exercises at home. When she exercises a long time she will get tightness in her knee. At night she will have pain on both sides of the knee.      Today's encounter utilized a language specific  (via iPad) to obtain the HPI, PAST MEDICAL/SURGICAL history, social history, family history, medications and allergies and review of systems; in addition to perform physical examination; review pertinent imaging studies; discuss exam findings and assessment; and go over treatment plan.      SURGERY: Total knee arthroplasty, right knee ([facility])  DATE OF SURGERY: 9/27/2022      HISTORY OF PRESENT ILLNESS: Gary Hinkle is a 62 year old female seen for postoperative evaluation of right total knee arthroplasty. It has been 6 weeks since surgery. Returns today stating doing ok, getting better. Sore with increased activities. Walks with a walker for the most part, sometimes without.    Denies any wound problems. Denies numbness, tingling, or weakness in the affected extremity. Denies fevers chills night sweats.   Use of pain medications has been tylenol only. Has been doing Physical Therapy in home. Concerns today include: none.        Past medical history:   Past Medical History:   Diagnosis Date     Hyperlipidemia LDL goal < 130      Hypothyroidism       Lichen sclerosus of female genitalia 10/9/2015     Obesity      Patient Active Problem List    Diagnosis Date Noted     Primary osteoarthritis of right knee 09/27/2022     Priority: Medium     Obesity 04/13/2022     Priority: Medium     Lichen sclerosus of female genitalia 10/09/2015     Priority: Medium     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     Priority: Medium     Hypothyroidism 09/08/2013     Priority: Medium     Positive thyroid antibodies         Hyperlipidemia LDL goal <130 09/06/2013     Priority: Medium       Past Surgical History:   Past Surgical History:   Procedure Laterality Date     APPENDECTOMY       ARTHROPLASTY KNEE Right 9/27/2022    Procedure: ARTHROPLASTY, RIGHT KNEE, TOTAL;  Surgeon: Shaka De Luna MD;  Location: [facility] OR       Medications:   Current Outpatient Medications:      acetaminophen (TYLENOL) 325 MG tablet, Take 2 tablets (650 mg) by mouth every 4 hours as needed for other (mild pain), Disp: 100 tablet, Rfl: 0     aspirin (ASA) 325 MG EC tablet, Take 1 tablet (325 mg) by mouth daily, Disp: 30 tablet, Rfl: 0     clobetasol (TEMOVATE) 0.05 % external ointment, Apply thin layer to effected area twice daily, Disp: 15 g, Rfl: 0     NP THYROID 60 MG tablet, TAKE ONE TABLET BY MOUTH ONE TIME DAILY, Disp: 90 tablet, Rfl: 0     oxyCODONE (ROXICODONE) 5 MG tablet, Take 1-2 tablets (5-10 mg) by mouth every 4 hours as needed for moderate to severe pain, Disp: 16 tablet, Rfl: 0     polyethylene glycol (MIRALAX) 17 GM/Dose powder, Take 17 g (1 capful) by mouth daily, Disp: 116 g, Rfl: 0     senna-docusate (SENOKOT-S/PERICOLACE) 8.6-50 MG tablet, Take 1-2 tablets by mouth 2 times daily Take while on oral narcotics to prevent or treat constipation., Disp: 30 tablet, Rfl: 0     simvastatin (ZOCOR) 10 MG tablet, Take 1 tablet (10 mg) by mouth At Bedtime, Disp: 90 tablet, Rfl: 3     vitamin D3 (CHOLECALCIFEROL) 10 MCG (400 UNIT) capsule, Take 1 capsule by mouth daily, Disp: , Rfl:     Allergies: No Known  "Allergies      REVIEW OF SYSTEMS:  CONSTITUTIONAL:NEGATIVE for fever, chills, night sweats, change in weight  INTEGUMENTARY/SKIN: NEGATIVE for worrisome rashes, moles or lesions  MUSCULOSKELETAL:See HPI above  NEURO: NEGATIVE for weakness, dizziness or paresthesias  CARDIOVASCULAR: negative for chest pain, shortness of breath    PHYSICAL EXAM:  /76   Pulse 76   Ht 1.575 m (5' 2\")   Wt 76.7 kg (169 lb)   LMP  (LMP Unknown)   BMI 30.91 kg/m     GENERAL APPEARANCE: healthy, alert, no distress  SKIN: no suspicious lesions or rashes  NEURO: Normal strength and tone, mentation intact and speech normal  PSYCH:  mentation appears normal and affect normal  RESPIRATORY: No increased work of breathing.    BILATERAL LOWER EXTREMITIES:  Gait: quadriceps avoidance right, using walker.  Bilateral calf soft and nttp or squeeze.    Right lower extremity:  Edema: trace    right  KNEE EXAM:    Incision: healing well. No drainage. Dry. Skin changes from her itching lateral to incision.  Skin: intact, no ecchymosis, no erythema  Some lateral discoloration from previous swelling blister.  ROM: 3 extension to 95 flexion  Effusion: small  Tender: diffuse      X-RAY:  2 views right knee from 11/11/2022  were reviewed in clinic today. No obvious fractures or dislocations. Total knee arthroplasty components in place without evidence of failure or loosening.        Impression: Gary Hinkle is a 62 year old female status post Total knee arthroplasty, right knee, doing well, 6.5 weeks out from surgery.      Plan:   * reviewed xrays with patient, showing total knee arthroplasty implants.  * continue with knee range of motion exercises, focusing on extension; more stiffness than typical at 6 weeks.  * wean off all pain medications as tolerated  * xrays next visit: 2 views of the knee  * return to clinic 6 weeks   * Physical Therapy, home exercise program daily. Will refer to outpatient Physical Therapy.  * all questions addressed and " answered prior to discharge from clinic today to patient's satisfaction.  * patient will need longterm prophylactic antibiotics use with dental procedures or other invasive procedures (2 g amoxicilin or 450mg ([id number]) clindamycin) 1 hour prior to dental procedures.    * KOOS Jr/Promis Knee score: IS to be done at next visit; NOT completed during todays visit; to be completed at 12 weeks and 12 months postoperative.      Shaka De Luna M.D., M.S.  Dept. of Orthopaedic Surgery  [facility]            Again, thank you for allowing me to participate in the care of your patient.        Sincerely,        Shaka De Luna MD

## 2022-11-11 NOTE — PROGRESS NOTES
Chief Complaint   Patient presents with     Right Knee - Total Joint Post-op     DOS 9/27/22, 6.5 wk s/p. Patient presents to clinic using a walker as an aid. Patient notes her knee is doing ok, a little bit of pain. She will walk without the walker sometimes at home. She has been doing physical therapy at home and is here for a follow up to determine if she should go to outpatient physical therapy. She has been doing her exercises at home. When she exercises a long time she will get tightness in her knee. At night she will have pain on both sides of the knee.      Today's encounter utilized a language specific  (via iPad) to obtain the HPI, PAST MEDICAL/SURGICAL history, social history, family history, medications and allergies and review of systems; in addition to perform physical examination; review pertinent imaging studies; discuss exam findings and assessment; and go over treatment plan.      SURGERY: Total knee arthroplasty, right knee ([facility])  DATE OF SURGERY: 9/27/2022      HISTORY OF PRESENT ILLNESS: Gary Hinkle is a 62 year old female seen for postoperative evaluation of right total knee arthroplasty. It has been 6 weeks since surgery. Returns today stating doing ok, getting better. Sore with increased activities. Walks with a walker for the most part, sometimes without.    Denies any wound problems. Denies numbness, tingling, or weakness in the affected extremity. Denies fevers chills night sweats.   Use of pain medications has been tylenol only. Has been doing Physical Therapy in home. Concerns today include: none.        Past medical history:   Past Medical History:   Diagnosis Date     Hyperlipidemia LDL goal < 130      Hypothyroidism      Lichen sclerosus of female genitalia 10/9/2015     Obesity      Patient Active Problem List    Diagnosis Date Noted     Primary osteoarthritis of right knee 09/27/2022     Priority: Medium     Obesity 04/13/2022     Priority: Medium      Lichen sclerosus of female genitalia 10/09/2015     Priority: Medium     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     Priority: Medium     Hypothyroidism 09/08/2013     Priority: Medium     Positive thyroid antibodies         Hyperlipidemia LDL goal <130 09/06/2013     Priority: Medium       Past Surgical History:   Past Surgical History:   Procedure Laterality Date     APPENDECTOMY       ARTHROPLASTY KNEE Right 9/27/2022    Procedure: ARTHROPLASTY, RIGHT KNEE, TOTAL;  Surgeon: Shaka De Luna MD;  Location: [facility] OR       Medications:   Current Outpatient Medications:      acetaminophen (TYLENOL) 325 MG tablet, Take 2 tablets (650 mg) by mouth every 4 hours as needed for other (mild pain), Disp: 100 tablet, Rfl: 0     aspirin (ASA) 325 MG EC tablet, Take 1 tablet (325 mg) by mouth daily, Disp: 30 tablet, Rfl: 0     clobetasol (TEMOVATE) 0.05 % external ointment, Apply thin layer to effected area twice daily, Disp: 15 g, Rfl: 0     NP THYROID 60 MG tablet, TAKE ONE TABLET BY MOUTH ONE TIME DAILY, Disp: 90 tablet, Rfl: 0     oxyCODONE (ROXICODONE) 5 MG tablet, Take 1-2 tablets (5-10 mg) by mouth every 4 hours as needed for moderate to severe pain, Disp: 16 tablet, Rfl: 0     polyethylene glycol (MIRALAX) 17 GM/Dose powder, Take 17 g (1 capful) by mouth daily, Disp: 116 g, Rfl: 0     senna-docusate (SENOKOT-S/PERICOLACE) 8.6-50 MG tablet, Take 1-2 tablets by mouth 2 times daily Take while on oral narcotics to prevent or treat constipation., Disp: 30 tablet, Rfl: 0     simvastatin (ZOCOR) 10 MG tablet, Take 1 tablet (10 mg) by mouth At Bedtime, Disp: 90 tablet, Rfl: 3     vitamin D3 (CHOLECALCIFEROL) 10 MCG (400 UNIT) capsule, Take 1 capsule by mouth daily, Disp: , Rfl:     Allergies: No Known Allergies      REVIEW OF SYSTEMS:  CONSTITUTIONAL:NEGATIVE for fever, chills, night sweats, change in weight  INTEGUMENTARY/SKIN: NEGATIVE for worrisome rashes, moles or lesions  MUSCULOSKELETAL:See HPI above  NEURO: NEGATIVE for weakness,  "dizziness or paresthesias  CARDIOVASCULAR: negative for chest pain, shortness of breath    PHYSICAL EXAM:  /76   Pulse 76   Ht 1.575 m (5' 2\")   Wt 76.7 kg (169 lb)   LMP  (LMP Unknown)   BMI 30.91 kg/m     GENERAL APPEARANCE: healthy, alert, no distress  SKIN: no suspicious lesions or rashes  NEURO: Normal strength and tone, mentation intact and speech normal  PSYCH:  mentation appears normal and affect normal  RESPIRATORY: No increased work of breathing.    BILATERAL LOWER EXTREMITIES:  Gait: quadriceps avoidance right, using walker.  Bilateral calf soft and nttp or squeeze.    Right lower extremity:  Edema: trace    right  KNEE EXAM:    Incision: healing well. No drainage. Dry. Skin changes from her itching lateral to incision.  Skin: intact, no ecchymosis, no erythema  Some lateral discoloration from previous swelling blister.  ROM: 3 extension to 95 flexion  Effusion: small  Tender: diffuse      X-RAY:  2 views right knee from 11/11/2022  were reviewed in clinic today. No obvious fractures or dislocations. Total knee arthroplasty components in place without evidence of failure or loosening.        Impression: Gary Hinkle is a 62 year old female status post Total knee arthroplasty, right knee, doing well, 6.5 weeks out from surgery.      Plan:   * reviewed xrays with patient, showing total knee arthroplasty implants.  * continue with knee range of motion exercises, focusing on extension; more stiffness than typical at 6 weeks.  * wean off all pain medications as tolerated  * xrays next visit: 2 views of the knee  * return to clinic 6 weeks   * Physical Therapy, home exercise program daily. Will refer to outpatient Physical Therapy.  * all questions addressed and answered prior to discharge from clinic today to patient's satisfaction.  * patient will need longterm prophylactic antibiotics use with dental procedures or other invasive procedures (2 g amoxicilin or 450mg ([id number]) clindamycin) 1 hour prior " to dental procedures.    * KOOS Jr/Promis Knee score: IS to be done at next visit; NOT completed during todays visit; to be completed at 12 weeks and 12 months postoperative.      Shaka De Luna M.D., M.S.  Dept. of Orthopaedic Surgery  [facility]

## 2022-11-22 ENCOUNTER — THERAPY VISIT (OUTPATIENT)
Dept: PHYSICAL THERAPY | Facility: CLINIC | Age: 62
End: 2022-11-22
Attending: ORTHOPAEDIC SURGERY
Payer: COMMERCIAL

## 2022-11-22 DIAGNOSIS — Z96.651 STATUS POST TOTAL RIGHT KNEE REPLACEMENT: ICD-10-CM

## 2022-11-22 DIAGNOSIS — Z96.651 AFTERCARE FOLLOWING RIGHT KNEE JOINT REPLACEMENT SURGERY: Primary | ICD-10-CM

## 2022-11-22 DIAGNOSIS — Z47.1 AFTERCARE FOLLOWING RIGHT KNEE JOINT REPLACEMENT SURGERY: Primary | ICD-10-CM

## 2022-11-22 PROCEDURE — 97161 PT EVAL LOW COMPLEX 20 MIN: CPT | Mod: GP | Performed by: PHYSICAL THERAPIST

## 2022-11-22 PROCEDURE — 97110 THERAPEUTIC EXERCISES: CPT | Mod: GP | Performed by: PHYSICAL THERAPIST

## 2022-11-22 NOTE — PROGRESS NOTES
Physical Therapy Initial Evaluation  Subjective:  The history is provided by the patient. A  was used (Mandarin (ID #[id number]) via iPad).   Patient Health History  Gary Hinkle being seen for s/p R TKA by Dr. De Luna. Patient reports things have been going smoothly since surgery but progress continues to be slow and painful. .     Problem began: 9/27/2022 (DOS).   Problem occurred: General wear and tear   Pain is reported as 4/10 on pain scale.  General health as reported by patient is good.  Pertinent medical history includes: overweight, thyroid problems and heart problems.   Red flags:  None as reported by patient.  Medical allergies: none.   Surgeries include:  None.    Current medications:  Cardiac medication, thyroid medication and pain medication.    Current occupation is None.                     Therapist Generated HPI Evaluation         Type of problem:  Right knee.    Chronicity: s/p surgery.  Condition occurred with:  Degenerative joint disease (s/p surgery).    Patient reports pain:  Anterior, in the joint, medial, lateral and posterior.    Radiates to: none.   Since onset symptoms are gradually improving.  Symptoms are exacerbated by activity, ascending stairs, descending stairs, walking and bending/squatting  and relieved by rest.  Imaging testing: s/p surgery.    Barriers include:  None as reported by patient.                        Objective:    Gait:  Decreased step length, increased double limb stance time    Gait Type:  Antalgic   Assistive Devices:  Walker                                                        Knee Evaluation:  ROM:  Strength wnl knee: notable glute and hamstring co-contraction with quad set.  AROM      Extension: Left:    Right:  12  Flexion: Left:   Right: 88  PROM      Extension: Left:   Right:  9  Flexion: Left:   Right:  90  Pain: Pain at end range flex and ext    Strength:     Extension:  Right: 3-/5   Pain:    Quad Set Left: WNL    Pain:   Quad Set Right: Fair     Pain:  Ligament Testing:  Not Assessed                Special Tests: Not Assessed      Palpation:  Palpation of knee: gastroc.    Right knee tenderness present at:  Semitendinosus and Patellar Medial  Edema:    Circumference:      Joint Line:  Left:  34.5 cm   Right:  38.5 cm            General     ROS    Assessment/Plan:    Patient is a 62 year old female with right side knee complaints.    Patient has the following significant findings with corresponding treatment plan.                Diagnosis 1:  S/p R TKA  Pain -  hot/cold therapy, manual therapy, splint/taping/bracing/orthotics, self management, education and home program  Decreased ROM/flexibility - manual therapy, therapeutic exercise and home program  Decreased joint mobility - manual therapy, therapeutic exercise and home program  Decreased strength - therapeutic exercise, therapeutic activities and home program  Edema - cold therapy and self management/home program  Impaired gait - gait training and home program  Impaired muscle performance - neuro re-education and home program  Decreased function - therapeutic activities and home program    Therapy Evaluation Codes:   Cumulative Therapy Evaluation is: Low complexity.    Previous and current functional limitations:  (See Goal Flow Sheet for this information)    Short term and Long term goals: (See Goal Flow Sheet for this information)     Communication ability:  Patient appears to be able to clearly communicate and understand verbal and written communication and follow directions correctly.  Treatment Explanation - The following has been discussed with the patient:   RX ordered/plan of care  Anticipated outcomes  Possible risks and side effects  This patient would benefit from PT intervention to resume normal activities.   Rehab potential is good.    Frequency:  1 X week, once daily  Duration:  For 12 weeks  Discharge Plan:  Achieve all LTG.  Independent in home treatment program.  Reach maximal  therapeutic benefit.    Please refer to the daily flowsheet for treatment today, total treatment time and time spent performing 1:1 timed codes.

## 2022-11-22 NOTE — PROGRESS NOTES
[facility]    OUTPATIENT Physical Therapy ORTHOPEDIC EVALUATION  PLAN OF TREATMENT FOR OUTPATIENT REHABILITATION  (COMPLETE FOR INITIAL CLAIMS ONLY)  Patient's Last Name, First Name, M.I.  YOB: 1960  Gary Hinkle       Provider s Name:  [facility]   Medical Record No.  [id number]   Start of Care Date:  11/22/22   Onset Date:  09/27/22    Treatment Diagnosis:  s/p R TKA Medical Diagnosis:     Status post total right knee replacement  Aftercare following right knee joint replacement surgery       Goals:     11/22/22 0500   Body Part   Goals listed below are for R knee   Goal #1   Goal #1 ambulation   Previous Functional Level No restrictions   Current Functional Level Minutes patient can walk   Performance Level 15 minutes with walker, antalgic pattern   STG Target Performance Minutes patient will be able to walk   Performance Level 15 minutes without walker, 4/10 PL at worst   Rationale for safe household ambulation;for safe outdoor household ambulation;for safe community ambulation;to promote a healthy and active lifestyle;to maintain proper body mechanics/posture while ambulating to avoid additional compensatory injury due to improper gait mechanics   Due Date 12/20/22    LTG Target Performance Minutes patient will be able to  walk   Performance Level 30 minutes with normal gait pattern with full knee ext, no AD, 2/10 PL at worst   Rationale for safe household ambulation;for safe community ambulation;for safe outdoor household ambulation;to maintain proper body mechanics/posture while ambulating to avoid additional compensatory injury due to improper gait mechanics;to promote a healthy and active lifestyle   Due Date 01/17/23       Therapy Frequency:  1x/week for 12 weeks  Predicted Duration of Therapy Intervention:  12 weeks    Tenzin Lane, PT                 I  CERTIFY THE NEED FOR THESE SERVICES FURNISHED UNDER        THIS PLAN OF TREATMENT AND WHILE UNDER MY CARE     (Physician co-signature of this document indicates review and certification of the therapy plan).                     Certification Date From:  11/22/22   Certification Date To:  02/14/23    Referring Provider:  Shaka De Luna    Initial Assessment        See Epic Evaluation SOC Date: 11/22/22

## 2022-11-25 DIAGNOSIS — E78.5 HYPERLIPIDEMIA LDL GOAL <130: ICD-10-CM

## 2022-11-28 RX ORDER — SIMVASTATIN 10 MG
10 TABLET ORAL AT BEDTIME
Qty: 90 TABLET | Refills: 0 | Status: SHIPPED | OUTPATIENT
Start: 2022-11-28 | End: 2023-02-27

## 2022-12-06 ENCOUNTER — THERAPY VISIT (OUTPATIENT)
Dept: PHYSICAL THERAPY | Facility: CLINIC | Age: 62
End: 2022-12-06
Payer: COMMERCIAL

## 2022-12-06 DIAGNOSIS — Z96.651 STATUS POST TOTAL RIGHT KNEE REPLACEMENT: Primary | ICD-10-CM

## 2022-12-06 DIAGNOSIS — Z96.651 AFTERCARE FOLLOWING RIGHT KNEE JOINT REPLACEMENT SURGERY: ICD-10-CM

## 2022-12-06 DIAGNOSIS — E03.9 ACQUIRED HYPOTHYROIDISM: ICD-10-CM

## 2022-12-06 DIAGNOSIS — Z47.1 AFTERCARE FOLLOWING RIGHT KNEE JOINT REPLACEMENT SURGERY: ICD-10-CM

## 2022-12-06 PROCEDURE — 97110 THERAPEUTIC EXERCISES: CPT | Mod: GP | Performed by: PHYSICAL THERAPIST

## 2022-12-06 PROCEDURE — 97116 GAIT TRAINING THERAPY: CPT | Mod: GP | Performed by: PHYSICAL THERAPIST

## 2022-12-06 RX ORDER — LEVOTHYROXINE, LIOTHYRONINE 38; 9 UG/1; UG/1
TABLET ORAL
Qty: 90 TABLET | Refills: 1 | Status: SHIPPED | OUTPATIENT
Start: 2022-12-06 | End: 2023-04-03

## 2022-12-06 NOTE — TELEPHONE ENCOUNTER
Son calling regarding refill request. States pt is almost out of medication. Prescription approved per [facility] Refill Protocol.    Pt was scheduled for a physical for 4/3/23.    Kailyn Smith RN  [facility]

## 2022-12-18 NOTE — PROGRESS NOTES
Chief Complaint   Patient presents with     Right Knee - Total Joint Post-op     S/P Right TKA, DOS 9/27/22, 12.5 weeks. She is doing very well, no pain in the knee today, she is very happy with her knee replacement. She is still working with physical therapy, this is going well.      Today's encounter utilized a language specific  (via iPad) to obtain the HPI, PAST MEDICAL/SURGICAL history, social history, family history, medications and allergies and review of systems; in addition to perform physical examination; review pertinent imaging studies; discuss exam findings and assessment; and go over treatment plan.      SURGERY: Total knee arthroplasty, right knee ([facility])  DATE OF SURGERY: 9/27/2022      HISTORY OF PRESENT ILLNESS: Gary Hinkle is a 62 year old female seen for postoperative evaluation of right total knee arthroplasty. It has been 12 weeks since surgery. Returns today stating doing well, not really any pain, only with bending the knee. She is pleased. Continues with Physical Therapy working on range of motion, going well.      Denies any wound problems. Denies numbness, tingling, or weakness in the affected extremity. Denies fevers chills night sweats.   Use of pain medications has been tylenol only.     Concerns today include: none.        Past medical history:   Past Medical History:   Diagnosis Date     Hyperlipidemia LDL goal < 130      Hypothyroidism      Lichen sclerosus of female genitalia 10/9/2015     Obesity      Patient Active Problem List    Diagnosis Date Noted     Status post total right knee replacement 11/22/2022     Priority: Medium     Aftercare following right knee joint replacement surgery 11/22/2022     Priority: Medium     Primary osteoarthritis of right knee 09/27/2022     Priority: Medium     Obesity 04/13/2022     Priority: Medium     Lichen sclerosus of female genitalia 10/09/2015     Priority: Medium     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     Priority: Medium      Hypothyroidism 09/08/2013     Priority: Medium     Positive thyroid antibodies         Hyperlipidemia LDL goal <130 09/06/2013     Priority: Medium       Past Surgical History:   Past Surgical History:   Procedure Laterality Date     APPENDECTOMY       ARTHROPLASTY KNEE Right 9/27/2022    Procedure: ARTHROPLASTY, RIGHT KNEE, TOTAL;  Surgeon: Shaka De Luna MD;  Location: [facility] OR       Medications:   Current Outpatient Medications:      acetaminophen (TYLENOL) 325 MG tablet, Take 2 tablets (650 mg) by mouth every 4 hours as needed for other (mild pain), Disp: 100 tablet, Rfl: 0     clobetasol (TEMOVATE) 0.05 % external ointment, Apply thin layer to effected area twice daily, Disp: 15 g, Rfl: 0     NP THYROID 60 MG tablet, TAKE ONE TABLET BY MOUTH ONE TIME DAILY, Disp: 90 tablet, Rfl: 1     polyethylene glycol (MIRALAX) 17 GM/Dose powder, Take 17 g (1 capful) by mouth daily (Patient not taking: Reported on 11/11/2022), Disp: 116 g, Rfl: 0     simvastatin (ZOCOR) 10 MG tablet, Take 1 tablet (10 mg) by mouth At Bedtime, Disp: 90 tablet, Rfl: 0     vitamin D3 (CHOLECALCIFEROL) 10 MCG (400 UNIT) capsule, Take 1 capsule by mouth daily, Disp: , Rfl:     Allergies: No Known Allergies      REVIEW OF SYSTEMS:  CONSTITUTIONAL:NEGATIVE for fever, chills, night sweats, change in weight  INTEGUMENTARY/SKIN: NEGATIVE for worrisome rashes, moles or lesions  MUSCULOSKELETAL:See HPI above  NEURO: NEGATIVE for weakness, dizziness or paresthesias  CARDIOVASCULAR: negative for chest pain, shortness of breath    PHYSICAL EXAM:  LMP  (LMP Unknown)    GENERAL APPEARANCE: healthy, alert, no distress  SKIN: no suspicious lesions or rashes  NEURO: Normal strength and tone, mentation intact and speech normal  PSYCH:  mentation appears normal and affect normal  RESPIRATORY: No increased work of breathing.    BILATERAL LOWER EXTREMITIES:  Gait: slight quadriceps avoidance right   Bilateral calf soft and nttp or squeeze.    Right lower  extremity:  Edema: trace    right  KNEE EXAM:    Incision: healed well. No drainage. Dry. Skin changes from her itching lateral to incision.  Skin: intact, no ecchymosis, no erythema  Some lateral discoloration from previous swelling blister.  Tender to palpation over the scar proximally.  ROM: 3 extension to 110 flexion  Effusion: small        X-RAY:  2 views right knee from 12/23/2022  were reviewed in clinic today. No obvious fractures or dislocations. Total knee arthroplasty components in place without evidence of failure or loosening.        Impression: Gary Hinkle is a 62 year old female status post Total knee arthroplasty, right knee, doing well, 12 weeks out from surgery.      Plan:   * reviewed xrays with patient, showing total knee arthroplasty implants.  * continue with knee range of motion exercises, focusing on extension; more stiffness than typical at 12 weeks.  * wean off all pain medications as tolerated  * xrays next visit: 2 views of the knee  * return to clinic 9 months  * Physical Therapy, home exercise program daily. Will refer to outpatient Physical Therapy.  * all questions addressed and answered prior to discharge from clinic today to patient's satisfaction.  * patient will need longterm prophylactic antibiotics use with dental procedures or other invasive procedures (2 g amoxicilin or 450mg ([id number]) clindamycin) 1 hour prior to dental procedures.    * KOOS Jr/Promis Knee score: IS to be done at next visit (12 months); WAS completed during todays visit; to be completed at 12 weeks and 12 months postoperative.      Shaka De Luna M.D., M.S.  Dept. of Orthopaedic Surgery  [facility]

## 2022-12-20 ENCOUNTER — THERAPY VISIT (OUTPATIENT)
Dept: PHYSICAL THERAPY | Facility: CLINIC | Age: 62
End: 2022-12-20
Payer: COMMERCIAL

## 2022-12-20 DIAGNOSIS — Z96.651 STATUS POST TOTAL RIGHT KNEE REPLACEMENT: Primary | ICD-10-CM

## 2022-12-20 DIAGNOSIS — Z47.1 AFTERCARE FOLLOWING RIGHT KNEE JOINT REPLACEMENT SURGERY: ICD-10-CM

## 2022-12-20 DIAGNOSIS — Z96.651 AFTERCARE FOLLOWING RIGHT KNEE JOINT REPLACEMENT SURGERY: ICD-10-CM

## 2022-12-20 PROCEDURE — 97110 THERAPEUTIC EXERCISES: CPT | Mod: GP | Performed by: PHYSICAL THERAPIST

## 2022-12-20 PROCEDURE — 97140 MANUAL THERAPY 1/> REGIONS: CPT | Mod: GP | Performed by: PHYSICAL THERAPIST

## 2022-12-20 NOTE — PROGRESS NOTES
PROGRESS  REPORT    Progress reporting period is from 11/22/2022 to 12/20/2022 (total of 3 visits).       SUBJECTIVE  Subjective: Things are better today. Walking has been better. Thigh is stronger now. Pain has been less lately. Doing HEP everyday.    Current Pain level: 1/10.     Initial Pain level: 4/10.   Changes in function:  Yes (See Goal flowsheet attached for changes in current functional level)  Adverse reaction to treatment or activity: None    OBJECTIVE  Changes noted in objective findings:  The objective findings below are from DOS 12/20/2022.    AAROM - 0-6-108  Gait - Ambulates without AD with mild limp due to reduced stride length and heel strike on uninvolved side  Integument - Surgical site healing well without signs of infection noted.     ASSESSMENT/PLAN  Updated problem list and treatment plan: Diagnosis 1:  s/p R TKA  Pain -  manual therapy, self management, education and home program  Decreased ROM/flexibility - manual therapy, therapeutic exercise, therapeutic activity and home program  Decreased joint mobility - manual therapy, therapeutic exercise, therapeutic activity and home program  Decreased strength - therapeutic exercise, therapeutic activities and home program  Impaired gait - gait training, assistive devices and home program  Decreased function - therapeutic activities and home program  STG/LTGs have been met or progress has been made towards goals:  Yes (See Goal flow sheet completed today.)  Assessment of Progress: The patient's condition is improving.  Self Management Plans:  Patient has been instructed in a home treatment program.  Patient is independent in a home treatment program.  I have re-evaluated this patient and find that the nature, scope, duration and intensity of the therapy is appropriate for the medical condition of the patient.  Limei continues to require the following intervention to meet STG and LTG's:  PT    Recommendations:  This patient would benefit from  continued therapy.     Frequency:  1 X week, once daily  Duration:  for 8 weeks        Please refer to the daily flowsheet for treatment today, total treatment time and time spent performing 1:1 timed codes.

## 2022-12-23 ENCOUNTER — ANCILLARY PROCEDURE (OUTPATIENT)
Dept: GENERAL RADIOLOGY | Facility: CLINIC | Age: 62
End: 2022-12-23
Attending: ORTHOPAEDIC SURGERY
Payer: COMMERCIAL

## 2022-12-23 ENCOUNTER — OFFICE VISIT (OUTPATIENT)
Dept: ORTHOPEDICS | Facility: CLINIC | Age: 62
End: 2022-12-23
Payer: COMMERCIAL

## 2022-12-23 DIAGNOSIS — Z96.651 STATUS POST TOTAL RIGHT KNEE REPLACEMENT: Primary | ICD-10-CM

## 2022-12-23 DIAGNOSIS — Z96.651 STATUS POST TOTAL RIGHT KNEE REPLACEMENT: ICD-10-CM

## 2022-12-23 PROCEDURE — 73560 X-RAY EXAM OF KNEE 1 OR 2: CPT | Mod: TC | Performed by: RADIOLOGY

## 2022-12-23 PROCEDURE — 99024 POSTOP FOLLOW-UP VISIT: CPT | Performed by: ORTHOPAEDIC SURGERY

## 2022-12-23 NOTE — LETTER
12/23/2022         RE: Gary Hinkle  [address] [place] [address]  [place] [address]        Dear Colleague,    Thank you for referring your patient, Gary Hinkle, to the [facility]. Please see a copy of my visit note below.    Chief Complaint   Patient presents with     Right Knee - Total Joint Post-op     S/P Right TKA, DOS 9/27/22, 12.5 weeks. She is doing very well, no pain in the knee today, she is very happy with her knee replacement. She is still working with physical therapy, this is going well.      Today's encounter utilized a language specific  (via iPad) to obtain the HPI, PAST MEDICAL/SURGICAL history, social history, family history, medications and allergies and review of systems; in addition to perform physical examination; review pertinent imaging studies; discuss exam findings and assessment; and go over treatment plan.      SURGERY: Total knee arthroplasty, right knee ([facility])  DATE OF SURGERY: 9/27/2022      HISTORY OF PRESENT ILLNESS: Gary Hinkle is a 62 year old female seen for postoperative evaluation of right total knee arthroplasty. It has been 12 weeks since surgery. Returns today stating doing well, not really any pain, only with bending the knee. She is pleased. Continues with Physical Therapy working on range of motion, going well.      Denies any wound problems. Denies numbness, tingling, or weakness in the affected extremity. Denies fevers chills night sweats.   Use of pain medications has been tylenol only.     Concerns today include: none.        Past medical history:   Past Medical History:   Diagnosis Date     Hyperlipidemia LDL goal < 130      Hypothyroidism      Lichen sclerosus of female genitalia 10/9/2015     Obesity      Patient Active Problem List    Diagnosis Date Noted     Status post total right knee replacement 11/22/2022     Priority: Medium     Aftercare following right knee joint replacement surgery 11/22/2022     Priority: Medium      Primary osteoarthritis of right knee 09/27/2022     Priority: Medium     Obesity 04/13/2022     Priority: Medium     Lichen sclerosus of female genitalia 10/09/2015     Priority: Medium     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     Priority: Medium     Hypothyroidism 09/08/2013     Priority: Medium     Positive thyroid antibodies         Hyperlipidemia LDL goal <130 09/06/2013     Priority: Medium       Past Surgical History:   Past Surgical History:   Procedure Laterality Date     APPENDECTOMY       ARTHROPLASTY KNEE Right 9/27/2022    Procedure: ARTHROPLASTY, RIGHT KNEE, TOTAL;  Surgeon: Shaka De Luna MD;  Location: [facility] OR       Medications:   Current Outpatient Medications:      acetaminophen (TYLENOL) 325 MG tablet, Take 2 tablets (650 mg) by mouth every 4 hours as needed for other (mild pain), Disp: 100 tablet, Rfl: 0     clobetasol (TEMOVATE) 0.05 % external ointment, Apply thin layer to effected area twice daily, Disp: 15 g, Rfl: 0     NP THYROID 60 MG tablet, TAKE ONE TABLET BY MOUTH ONE TIME DAILY, Disp: 90 tablet, Rfl: 1     polyethylene glycol (MIRALAX) 17 GM/Dose powder, Take 17 g (1 capful) by mouth daily (Patient not taking: Reported on 11/11/2022), Disp: 116 g, Rfl: 0     simvastatin (ZOCOR) 10 MG tablet, Take 1 tablet (10 mg) by mouth At Bedtime, Disp: 90 tablet, Rfl: 0     vitamin D3 (CHOLECALCIFEROL) 10 MCG (400 UNIT) capsule, Take 1 capsule by mouth daily, Disp: , Rfl:     Allergies: No Known Allergies      REVIEW OF SYSTEMS:  CONSTITUTIONAL:NEGATIVE for fever, chills, night sweats, change in weight  INTEGUMENTARY/SKIN: NEGATIVE for worrisome rashes, moles or lesions  MUSCULOSKELETAL:See HPI above  NEURO: NEGATIVE for weakness, dizziness or paresthesias  CARDIOVASCULAR: negative for chest pain, shortness of breath    PHYSICAL EXAM:  LMP  (LMP Unknown)    GENERAL APPEARANCE: healthy, alert, no distress  SKIN: no suspicious lesions or rashes  NEURO: Normal strength and tone, mentation intact and speech  normal  PSYCH:  mentation appears normal and affect normal  RESPIRATORY: No increased work of breathing.    BILATERAL LOWER EXTREMITIES:  Gait: slight quadriceps avoidance right   Bilateral calf soft and nttp or squeeze.    Right lower extremity:  Edema: trace    right  KNEE EXAM:    Incision: healed well. No drainage. Dry. Skin changes from her itching lateral to incision.  Skin: intact, no ecchymosis, no erythema  Some lateral discoloration from previous swelling blister.  Tender to palpation over the scar proximally.  ROM: 3 extension to 110 flexion  Effusion: small        X-RAY:  2 views right knee from 12/23/2022  were reviewed in clinic today. No obvious fractures or dislocations. Total knee arthroplasty components in place without evidence of failure or loosening.        Impression: Gary Hinkle is a 62 year old female status post Total knee arthroplasty, right knee, doing well, 12 weeks out from surgery.      Plan:   * reviewed xrays with patient, showing total knee arthroplasty implants.  * continue with knee range of motion exercises, focusing on extension; more stiffness than typical at 12 weeks.  * wean off all pain medications as tolerated  * xrays next visit: 2 views of the knee  * return to clinic 9 months  * Physical Therapy, home exercise program daily. Will refer to outpatient Physical Therapy.  * all questions addressed and answered prior to discharge from clinic today to patient's satisfaction.  * patient will need longterm prophylactic antibiotics use with dental procedures or other invasive procedures (2 g amoxicilin or 450mg ([id number]) clindamycin) 1 hour prior to dental procedures.    * KOOS Jr/Promis Knee score: IS to be done at next visit (12 months); WAS completed during todays visit; to be completed at 12 weeks and 12 months postoperative.      Shaka De Luna M.D., M.S.  Dept. of Orthopaedic Surgery  [facility]            Again, thank you for allowing me to participate in the  care of your patient.        Sincerely,        Shaka De Luna MD

## 2022-12-27 ENCOUNTER — THERAPY VISIT (OUTPATIENT)
Dept: PHYSICAL THERAPY | Facility: CLINIC | Age: 62
End: 2022-12-27
Payer: COMMERCIAL

## 2022-12-27 DIAGNOSIS — Z96.651 AFTERCARE FOLLOWING RIGHT KNEE JOINT REPLACEMENT SURGERY: ICD-10-CM

## 2022-12-27 DIAGNOSIS — Z47.1 AFTERCARE FOLLOWING RIGHT KNEE JOINT REPLACEMENT SURGERY: ICD-10-CM

## 2022-12-27 DIAGNOSIS — Z96.651 STATUS POST TOTAL RIGHT KNEE REPLACEMENT: Primary | ICD-10-CM

## 2022-12-27 PROCEDURE — 97140 MANUAL THERAPY 1/> REGIONS: CPT | Mod: GP | Performed by: PHYSICAL THERAPIST

## 2022-12-27 PROCEDURE — 97110 THERAPEUTIC EXERCISES: CPT | Mod: GP | Performed by: PHYSICAL THERAPIST

## 2023-01-03 ENCOUNTER — THERAPY VISIT (OUTPATIENT)
Dept: PHYSICAL THERAPY | Facility: CLINIC | Age: 63
End: 2023-01-03
Payer: COMMERCIAL

## 2023-01-03 DIAGNOSIS — Z47.1 AFTERCARE FOLLOWING RIGHT KNEE JOINT REPLACEMENT SURGERY: ICD-10-CM

## 2023-01-03 DIAGNOSIS — Z96.651 AFTERCARE FOLLOWING RIGHT KNEE JOINT REPLACEMENT SURGERY: ICD-10-CM

## 2023-01-03 DIAGNOSIS — Z96.651 STATUS POST TOTAL RIGHT KNEE REPLACEMENT: Primary | ICD-10-CM

## 2023-01-03 PROCEDURE — 97110 THERAPEUTIC EXERCISES: CPT | Mod: GP | Performed by: PHYSICAL THERAPIST

## 2023-01-03 PROCEDURE — 97140 MANUAL THERAPY 1/> REGIONS: CPT | Mod: GP | Performed by: PHYSICAL THERAPIST

## 2023-01-10 ENCOUNTER — THERAPY VISIT (OUTPATIENT)
Dept: PHYSICAL THERAPY | Facility: CLINIC | Age: 63
End: 2023-01-10
Payer: COMMERCIAL

## 2023-01-10 DIAGNOSIS — Z96.651 AFTERCARE FOLLOWING RIGHT KNEE JOINT REPLACEMENT SURGERY: ICD-10-CM

## 2023-01-10 DIAGNOSIS — Z96.651 STATUS POST TOTAL RIGHT KNEE REPLACEMENT: Primary | ICD-10-CM

## 2023-01-10 DIAGNOSIS — Z47.1 AFTERCARE FOLLOWING RIGHT KNEE JOINT REPLACEMENT SURGERY: ICD-10-CM

## 2023-01-10 PROCEDURE — 97140 MANUAL THERAPY 1/> REGIONS: CPT | Mod: GP | Performed by: PHYSICAL THERAPIST

## 2023-01-10 PROCEDURE — 97116 GAIT TRAINING THERAPY: CPT | Mod: GP | Performed by: PHYSICAL THERAPIST

## 2023-01-10 PROCEDURE — 97110 THERAPEUTIC EXERCISES: CPT | Mod: GP | Performed by: PHYSICAL THERAPIST

## 2023-01-17 ENCOUNTER — THERAPY VISIT (OUTPATIENT)
Dept: PHYSICAL THERAPY | Facility: CLINIC | Age: 63
End: 2023-01-17
Payer: COMMERCIAL

## 2023-01-17 DIAGNOSIS — Z47.1 AFTERCARE FOLLOWING RIGHT KNEE JOINT REPLACEMENT SURGERY: ICD-10-CM

## 2023-01-17 DIAGNOSIS — Z96.651 AFTERCARE FOLLOWING RIGHT KNEE JOINT REPLACEMENT SURGERY: ICD-10-CM

## 2023-01-17 DIAGNOSIS — Z96.651 STATUS POST TOTAL RIGHT KNEE REPLACEMENT: Primary | ICD-10-CM

## 2023-01-17 PROCEDURE — 97110 THERAPEUTIC EXERCISES: CPT | Mod: GP | Performed by: PHYSICAL THERAPIST

## 2023-01-21 ENCOUNTER — HEALTH MAINTENANCE LETTER (OUTPATIENT)
Age: 63
End: 2023-01-21

## 2023-01-24 ENCOUNTER — THERAPY VISIT (OUTPATIENT)
Dept: PHYSICAL THERAPY | Facility: CLINIC | Age: 63
End: 2023-01-24
Payer: COMMERCIAL

## 2023-01-24 DIAGNOSIS — Z96.651 AFTERCARE FOLLOWING RIGHT KNEE JOINT REPLACEMENT SURGERY: ICD-10-CM

## 2023-01-24 DIAGNOSIS — Z96.651 STATUS POST TOTAL RIGHT KNEE REPLACEMENT: Primary | ICD-10-CM

## 2023-01-24 DIAGNOSIS — Z47.1 AFTERCARE FOLLOWING RIGHT KNEE JOINT REPLACEMENT SURGERY: ICD-10-CM

## 2023-01-24 PROCEDURE — 97110 THERAPEUTIC EXERCISES: CPT | Mod: GP | Performed by: PHYSICAL THERAPIST

## 2023-01-31 ENCOUNTER — THERAPY VISIT (OUTPATIENT)
Dept: PHYSICAL THERAPY | Facility: CLINIC | Age: 63
End: 2023-01-31
Payer: COMMERCIAL

## 2023-01-31 DIAGNOSIS — Z96.651 STATUS POST TOTAL RIGHT KNEE REPLACEMENT: Primary | ICD-10-CM

## 2023-01-31 DIAGNOSIS — Z96.651 AFTERCARE FOLLOWING RIGHT KNEE JOINT REPLACEMENT SURGERY: ICD-10-CM

## 2023-01-31 DIAGNOSIS — Z47.1 AFTERCARE FOLLOWING RIGHT KNEE JOINT REPLACEMENT SURGERY: ICD-10-CM

## 2023-01-31 PROCEDURE — 97110 THERAPEUTIC EXERCISES: CPT | Mod: GP | Performed by: PHYSICAL THERAPIST

## 2023-01-31 NOTE — PROGRESS NOTES
DISCHARGE REPORT    Progress reporting period is from 11/22/2022 to 1/31/2023.       SUBJECTIVE  Subjective: Things have been feeling pretty good. Can walk at home for about 1 hour before having to stop due to fatigue. Stairs have not been an issue. Able to go up and down stairs every other step (reciprical). No concerns at this time for daily activities. Ready to be done with physical therapy.    Current Pain level: 1/10.     Initial Pain level: 4/10.   Changes in function:  Yes (See Goal flowsheet attached for changes in current functional level)  Adverse reaction to treatment or activity: None    OBJECTIVE  Changes noted in objective findings:  The objective findings below are from DOS 1/31/2023.    PROM - 0-2-117  Stairs - Performed 1 flight with reciprical gait pattern and handrail assist safely  Ambulation without AD without limp     ASSESSMENT/PLAN  Updated problem list and treatment plan: Diagnosis 1:  s/p R TKA  STG/LTGs have been met or progress has been made towards goals:  Yes (See Goal flow sheet completed today.)  Assessment of Progress: The patient has met all of their long term goals.  Self Management Plans:  Patient has been instructed in a home treatment program.  Patient is independent in a home treatment program.  I have re-evaluated this patient and find that the nature, scope, duration and intensity of the therapy is appropriate for the medical condition of the patient.    Recommendations:  This patient is ready to be discharged from therapy and continue their home treatment program.    Please refer to the daily flowsheet for treatment today, total treatment time and time spent performing 1:1 timed codes.

## 2023-02-25 DIAGNOSIS — E78.5 HYPERLIPIDEMIA LDL GOAL <130: ICD-10-CM

## 2023-02-27 RX ORDER — SIMVASTATIN 10 MG
10 TABLET ORAL AT BEDTIME
Qty: 90 TABLET | Refills: 0 | Status: SHIPPED | OUTPATIENT
Start: 2023-02-27 | End: 2023-03-27

## 2023-03-25 DIAGNOSIS — E78.5 HYPERLIPIDEMIA LDL GOAL <130: ICD-10-CM

## 2023-03-27 RX ORDER — SIMVASTATIN 10 MG
10 TABLET ORAL AT BEDTIME
Qty: 90 TABLET | Refills: 0 | Status: SHIPPED | OUTPATIENT
Start: 2023-03-27 | End: 2023-04-03

## 2023-03-30 PROBLEM — Z96.651 STATUS POST TOTAL RIGHT KNEE REPLACEMENT: Status: ACTIVE | Noted: 2022-11-22

## 2023-03-30 PROBLEM — M17.11 PRIMARY OSTEOARTHRITIS OF RIGHT KNEE: Status: RESOLVED | Noted: 2022-09-27 | Resolved: 2023-03-30

## 2023-04-03 ENCOUNTER — OFFICE VISIT (OUTPATIENT)
Dept: INTERNAL MEDICINE | Facility: CLINIC | Age: 63
End: 2023-04-03
Payer: COMMERCIAL

## 2023-04-03 VITALS
HEIGHT: 62 IN | HEART RATE: 72 BPM | WEIGHT: 173 LBS | OXYGEN SATURATION: 95 % | DIASTOLIC BLOOD PRESSURE: 75 MMHG | SYSTOLIC BLOOD PRESSURE: 114 MMHG | BODY MASS INDEX: 31.83 KG/M2

## 2023-04-03 DIAGNOSIS — Z96.651 S/P TOTAL KNEE ARTHROPLASTY, RIGHT: ICD-10-CM

## 2023-04-03 DIAGNOSIS — Z00.01 ENCOUNTER FOR GENERAL ADULT MEDICAL EXAMINATION WITH ABNORMAL FINDINGS: Primary | ICD-10-CM

## 2023-04-03 DIAGNOSIS — R53.83 FATIGUE, UNSPECIFIED TYPE: ICD-10-CM

## 2023-04-03 DIAGNOSIS — G47.00 INSOMNIA, UNSPECIFIED TYPE: ICD-10-CM

## 2023-04-03 DIAGNOSIS — Z12.31 ENCOUNTER FOR SCREENING MAMMOGRAM FOR BREAST CANCER: ICD-10-CM

## 2023-04-03 DIAGNOSIS — Z00.00 ROUTINE GENERAL MEDICAL EXAMINATION AT A HEALTH CARE FACILITY: ICD-10-CM

## 2023-04-03 DIAGNOSIS — E78.5 HYPERLIPIDEMIA LDL GOAL <130: ICD-10-CM

## 2023-04-03 DIAGNOSIS — R73.09 ELEVATED GLUCOSE: ICD-10-CM

## 2023-04-03 DIAGNOSIS — E03.9 ACQUIRED HYPOTHYROIDISM: ICD-10-CM

## 2023-04-03 DIAGNOSIS — Z12.4 SCREENING FOR MALIGNANT NEOPLASM OF CERVIX: ICD-10-CM

## 2023-04-03 DIAGNOSIS — Z96.651 STATUS POST TOTAL RIGHT KNEE REPLACEMENT: ICD-10-CM

## 2023-04-03 LAB
ANION GAP SERPL CALCULATED.3IONS-SCNC: 3 MMOL/L (ref 3–14)
BUN SERPL-MCNC: 16 MG/DL (ref 7–30)
CALCIUM SERPL-MCNC: 9.6 MG/DL (ref 8.5–10.1)
CHLORIDE BLD-SCNC: 106 MMOL/L (ref 94–109)
CHOLEST SERPL-MCNC: 176 MG/DL
CO2 SERPL-SCNC: 29 MMOL/L (ref 20–32)
CREAT SERPL-MCNC: 0.55 MG/DL (ref 0.52–1.04)
ERYTHROCYTE [DISTWIDTH] IN BLOOD BY AUTOMATED COUNT: 12.5 % (ref 10–15)
FASTING STATUS PATIENT QL REPORTED: YES
GFR SERPL CREATININE-BSD FRML MDRD: >90 ML/MIN/1.73M2
GLUCOSE BLD-MCNC: 116 MG/DL (ref 70–99)
HBA1C MFR BLD: 6 % (ref 0–5.6)
HCT VFR BLD AUTO: 39.7 % (ref 35–47)
HDLC SERPL-MCNC: 55 MG/DL
HGB BLD-MCNC: 13.3 G/DL (ref 11.7–15.7)
LDLC SERPL CALC-MCNC: 94 MG/DL
MCH RBC QN AUTO: 29.8 PG (ref 26.5–33)
MCHC RBC AUTO-ENTMCNC: 33.5 G/DL (ref 31.5–36.5)
MCV RBC AUTO: 89 FL (ref 78–100)
NONHDLC SERPL-MCNC: 121 MG/DL
PLATELET # BLD AUTO: 225 10E3/UL (ref 150–450)
POTASSIUM BLD-SCNC: 4.3 MMOL/L (ref 3.4–5.3)
RBC # BLD AUTO: 4.47 10E6/UL (ref 3.8–5.2)
SODIUM SERPL-SCNC: 138 MMOL/L (ref 133–144)
TRIGL SERPL-MCNC: 134 MG/DL
TSH SERPL DL<=0.005 MIU/L-ACNC: 2.62 MU/L (ref 0.4–4)
WBC # BLD AUTO: 4.8 10E3/UL (ref 4–11)

## 2023-04-03 PROCEDURE — 85027 COMPLETE CBC AUTOMATED: CPT | Performed by: INTERNAL MEDICINE

## 2023-04-03 PROCEDURE — 36415 COLL VENOUS BLD VENIPUNCTURE: CPT | Performed by: INTERNAL MEDICINE

## 2023-04-03 PROCEDURE — G0145 SCR C/V CYTO,THINLAYER,RESCR: HCPCS | Performed by: INTERNAL MEDICINE

## 2023-04-03 PROCEDURE — 84443 ASSAY THYROID STIM HORMONE: CPT | Performed by: INTERNAL MEDICINE

## 2023-04-03 PROCEDURE — 80048 BASIC METABOLIC PNL TOTAL CA: CPT | Performed by: INTERNAL MEDICINE

## 2023-04-03 PROCEDURE — 83036 HEMOGLOBIN GLYCOSYLATED A1C: CPT | Performed by: INTERNAL MEDICINE

## 2023-04-03 PROCEDURE — 99396 PREV VISIT EST AGE 40-64: CPT | Performed by: INTERNAL MEDICINE

## 2023-04-03 PROCEDURE — 99213 OFFICE O/P EST LOW 20 MIN: CPT | Mod: 25 | Performed by: INTERNAL MEDICINE

## 2023-04-03 PROCEDURE — 80061 LIPID PANEL: CPT | Performed by: INTERNAL MEDICINE

## 2023-04-03 PROCEDURE — 87624 HPV HI-RISK TYP POOLED RSLT: CPT | Performed by: INTERNAL MEDICINE

## 2023-04-03 RX ORDER — LEVOTHYROXINE, LIOTHYRONINE 38; 9 UG/1; UG/1
60 TABLET ORAL DAILY
Qty: 90 TABLET | Refills: 3 | Status: SHIPPED | OUTPATIENT
Start: 2023-04-03 | End: 2024-04-29

## 2023-04-03 RX ORDER — SIMVASTATIN 10 MG
10 TABLET ORAL AT BEDTIME
Qty: 90 TABLET | Refills: 3 | Status: SHIPPED | OUTPATIENT
Start: 2023-04-03 | End: 2024-05-01

## 2023-04-03 ASSESSMENT — ENCOUNTER SYMPTOMS
DIZZINESS: 0
HEARTBURN: 0
NUMBNESS: 0
CONSTIPATION: 0
DYSPHORIC MOOD: 0
SORE THROAT: 1
DIARRHEA: 0
APNEA: 0
HEMATOCHEZIA: 0
COUGH: 0
PARESTHESIAS: 0
MYALGIAS: 0
JOINT SWELLING: 1
VOMITING: 0
NERVOUS/ANXIOUS: 0
ADENOPATHY: 0
FATIGUE: 1
WEAKNESS: 0
SHORTNESS OF BREATH: 0
CHILLS: 0
NAUSEA: 0
DYSURIA: 0
WHEEZING: 0
PALPITATIONS: 0
ABDOMINAL PAIN: 0
SLEEP DISTURBANCE: 1
BACK PAIN: 0
UNEXPECTED WEIGHT CHANGE: 0
FEVER: 0
HEADACHES: 0
ACTIVITY CHANGE: 0
ARTHRALGIAS: 1
RHINORRHEA: 0

## 2023-04-03 NOTE — PATIENT INSTRUCTIONS
Sleep study:   will call you    =================================================================    Preventive Health Recommendations  Female Ages 50 - 64    Yearly exam: See your health care provider every year in order to  Review health changes.   Discuss preventive care.    Review your medicines if your doctor has prescribed any.    Get a Pap test every three years (unless you have an abnormal result and your provider advises testing more often).  If you get Pap tests with HPV test, you only need to test every 5 years, unless you have an abnormal result.   You do not need a Pap test if your uterus was removed (hysterectomy) and you have not had cancer.  You should be tested each year for STDs (sexually transmitted diseases) if you're at risk.   Have a mammogram every 1 to 2 years.  Have a colonoscopy at age 50, or have a yearly FIT test (stool test). These exams screen for colon cancer.    Have a cholesterol test every 5 years, or more often if advised.  Have a diabetes test (fasting glucose) every three years. If you are at risk for diabetes, you should have this test more often.   If you are at risk for osteoporosis (brittle bone disease), think about having a bone density scan (DEXA).    Shots: Get a flu shot each year. Get a tetanus shot every 10 years.    Nutrition:   Eat at least 5 servings of fruits and vegetables each day.  Eat whole-grain bread, whole-wheat pasta and brown rice instead of white grains and rice.  Get adequate Calcium and Vitamin D.     Lifestyle  Exercise at least 150 minutes a week (30 minutes a day, 5 days a week). This will help you control your weight and prevent disease.  Limit alcohol to one drink per day.  No smoking.   Wear sunscreen to prevent skin cancer.   See your dentist every six months for an exam and cleaning.  See your eye doctor every 1 to 2 years.

## 2023-04-03 NOTE — RESULT ENCOUNTER NOTE
"Gary Hinkle    Electrolytes and kidney function look great.   The HgbA1C is in the \"pre-diabetes\" range.  Keep your weight stable, get daily exercise, this will help.    Cholesterol looks great.  Blood count normal.  The Thyroid hormone level is great: No changes needed in medication    It was nice to see you today!    The PAP results will come in a separate letter.       FABIOLA INMAN M.D.  "

## 2023-04-03 NOTE — LETTER
"April 4, 2023      Gary Hinkle  [address] [place] [address]  [place] [address]    Dear Gary:    We are writing to inform you of your test results.    Electrolytes and kidney function look great.   The HgbA1C is in the \"pre-diabetes\" range.  Keep your weight stable, get daily exercise, this will help.    Cholesterol looks great.  Blood count normal.  The thyroid hormone level is great: No changes needed in medication.     It was nice to see you today!     The PAP results will come in a separate letter.   Resulted Orders   Hemoglobin A1c   Result Value Ref Range    Hemoglobin A1C 6.0 (H) 0.0 - 5.6 %      Comment:      Normal <5.7%   Prediabetes 5.7-6.4%    Diabetes 6.5% or higher     Note: Adopted from ADA consensus guidelines.   Lipid panel reflex to direct LDL Fasting   Result Value Ref Range    Cholesterol 176 <200 mg/dL    Triglycerides 134 <150 mg/dL    Direct Measure HDL 55 >=50 mg/dL    LDL Cholesterol Calculated 94 <=100 mg/dL    Non HDL Cholesterol 121 <130 mg/dL    Patient Fasting > 8hrs? Yes     Narrative    Cholesterol  Desirable:  <200 mg/dL    Triglycerides  Normal:  Less than 150 mg/dL  Borderline High:  150-199 mg/dL  High:  200-499 mg/dL  Very High:  Greater than or equal to 500 mg/dL    Direct Measure HDL  Female:  Greater than or equal to 50 mg/dL   Male:  Greater than or equal to 40 mg/dL    LDL Cholesterol  Desirable:  <100mg/dL  Above Desirable:  100-129 mg/dL   Borderline High:  130-159 mg/dL   High:  160-189 mg/dL   Very High:  >= 190 mg/dL    Non HDL Cholesterol  Desirable:  130 mg/dL  Above Desirable:  130-159 mg/dL  Borderline High:  160-189 mg/dL  High:  190-219 mg/dL  Very High:  Greater than or equal to 220 mg/dL   Basic metabolic panel  (Ca, Cl, CO2, Creat, Gluc, K, Na, BUN)   Result Value Ref Range    Sodium 138 133 - 144 mmol/L    Potassium 4.3 3.4 - 5.3 mmol/L    Chloride 106 94 - 109 mmol/L    Carbon Dioxide (CO2) 29 20 - 32 mmol/L    Anion Gap 3 3 - 14 mmol/L    Urea Nitrogen 16 7 - 30 mg/dL "    Creatinine 0.55 0.52 - 1.04 mg/dL    Calcium 9.6 8.5 - 10.1 mg/dL    Glucose 116 (H) 70 - 99 mg/dL    GFR Estimate >90 >60 mL/min/1.73m2      Comment:      eGFR calculated using 2021 CKD-EPI equation.   CBC with platelets   Result Value Ref Range    WBC Count 4.8 4.0 - 11.0 10e3/uL    RBC Count 4.47 3.80 - 5.20 10e6/uL    Hemoglobin 13.3 11.7 - 15.7 g/dL    Hematocrit 39.7 35.0 - 47.0 %    MCV 89 78 - 100 fL    MCH 29.8 26.5 - 33.0 pg    MCHC 33.5 31.5 - 36.5 g/dL    RDW 12.5 10.0 - 15.0 %    Platelet Count 225 150 - 450 10e3/uL   TSH with free T4 reflex   Result Value Ref Range    TSH 2.62 0.40 - 4.00 mU/L     If you have any questions or concerns, please call the clinic at the number listed above.     Sincerely,      Cindy Chance MD/tg

## 2023-04-03 NOTE — PROGRESS NOTES
SUBJECTIVE:   CC: Gary is an 62 year old who presents for preventive health visit.     63 y/o F here for AFE.    Using mandarin   h/o djd knee, Hyperlipidemia and hypothyroid.  > Had been working on conservative mgmt of R knee djd, unable to get good results.   She had R TKA 9/2022, doing well         View : No data to display.              Patient has been advised of split billing requirements and indicates understanding: Yes  Healthy Habits:    Getting at least 3 servings of Calcium per day:  Yes    Bi-annual eye exam:  Yes    Dental care twice a year:  NO (once per year)    Sleep apnea or symptoms of sleep apnea:  Excessive snoring and Daytime drowsiness    Diet:  Regular (no restrictions)    Duration of exercise:: sometimes will go for walks but had surgery on leg so doesn't get as much exercise.    Taking medications regularly:  Yes    Barriers to taking medications:  None    Medication side effects:  None    PHQ-2 Total Score:    Additional concerns today:  Yes (sometimes cannot fall asleep at night)        Today's PHQ-2 Score:       4/3/2023     7:51 AM   PHQ-2 ( 1999 [employer])   Q1: Little interest or pleasure in doing things 0   Q2: Feeling down, depressed or hopeless 0   PHQ-2 Score 0           Social History     Tobacco Use     Smoking status: Never     Smokeless tobacco: Never   Vaping Use     Vaping status: Never Used   Substance Use Topics     Alcohol use: No              View : No data to display.              Reviewed orders with patient.  Reviewed health maintenance and updated orders accordingly - Yes  Lab work is in process    Breast Cancer Screening:  Any new diagnosis of family breast, ovarian, or bowel cancer? No    FHS-7:        View : No data to display.                Mammogram Screening: Recommended mammography every 1-2 years with patient discussion and risk factor consideration  Pertinent mammograms are reviewed under the imaging tab.    History of abnormal Pap smear: NO -  age 30-65 PAP every 5 years with negative HPV co-testing recommended      Latest Ref Rng & Units 3/28/2019     8:24 AM 3/28/2019     8:22 AM 8/30/2013    12:00 AM   PAP / HPV   PAP (Historical)   NIL   NIL     HPV 16 DNA NEG^Negative Negative       HPV 18 DNA NEG^Negative Negative       Other HR HPV NEG^Negative Negative         Reviewed and updated as needed this visit by clinical staff    Allergies  Meds              Reviewed and updated as needed this visit by Provider                 Past Medical History:   Diagnosis Date     Hyperlipidemia LDL goal < 130      Hypothyroidism      Lichen sclerosus of female genitalia 10/9/2015     Obesity          61 y/o F here for AFE.    Using mandarin   h/o djd knee, Hyperlipidemia and hypothyroid.  > Had been working on conservative mgmt of R knee djd, unable to get good results.   She had R TKA 9/2022, doing well   > difficulty falling asleep for a year 4-6 hours. Unchanged. No meds tried.   > R knee lateral pain still- improved pain overall. Getting acupuncture- helping  > chronic sore throat+itchiness     Pt is 61 yo F with h/o djd s/p R tka 9/22, hld, hypothyroidism presenting for AFE. Pt endorses overall good health, but main concern today is sleep difficulty. Pt endorses 1+ year of difficulty falling asleep, averaging 4-6 hours a night. Endorses good sleep hygeine, no screen time. States she feels discomfort while laying down but denies pain. Denies racing thoughts.  massages body to help pt fall asleep. Has not tried any sleep medications. Pt endorses audible snoring and daytime fatigue, but denies being told she has periods of apnea.     R knee overall improved pain and mobility s/p TKA. Pt still experiencing pain along lateral joint line and some swelling. Does not impair mobility. Finished PT and continues to use acupuncture for pain management. Feels this is helpful.     Review of Systems   Constitutional: Positive for fatigue. Negative for  "activity change, chills, fever and unexpected weight change.   HENT: Positive for sore throat. Negative for congestion, ear discharge and rhinorrhea.    Eyes: Negative for visual disturbance.   Respiratory: Negative for apnea, cough, shortness of breath and wheezing.    Cardiovascular: Positive for chest pain (occasional midsternal, intermittent). Negative for palpitations and peripheral edema.   Gastrointestinal: Negative for abdominal pain, constipation, diarrhea, heartburn, hematochezia, nausea and vomiting.   Genitourinary: Negative for dysuria, pelvic pain and vaginal discharge.   Musculoskeletal: Positive for arthralgias (R knee laterally) and joint swelling. Negative for back pain, gait problem and myalgias.   Neurological: Negative for dizziness, weakness, numbness, headaches and paresthesias.   Hematological: Negative for adenopathy.   Psychiatric/Behavioral: Positive for sleep disturbance. Negative for dysphoric mood and mood changes. The patient is not nervous/anxious.          .     OBJECTIVE:   /75 (BP Location: Left arm, Patient Position: Sitting, Cuff Size: Adult Regular)   Pulse 72   Ht 1.581 m (5' 2.25\")   Wt 78.5 kg (173 lb)   LMP  (LMP Unknown)   SpO2 95%   BMI 31.39 kg/m    Physical Exam  Constitutional:       General: She is not in acute distress.  HENT:      Right Ear: Tympanic membrane, ear canal and external ear normal.      Left Ear: Tympanic membrane, ear canal and external ear normal.      Nose: Nose normal. No congestion.      Mouth/Throat:      Mouth: Mucous membranes are moist.      Pharynx: Oropharynx is clear. No oropharyngeal exudate.   Eyes:      Conjunctiva/sclera: Conjunctivae normal.      Pupils: Pupils are equal, round, and reactive to light.   Neck:      Thyroid: No thyromegaly.   Cardiovascular:      Rate and Rhythm: Normal rate and regular rhythm.      Pulses: Normal pulses.           Dorsalis pedis pulses are 2+ on the right side and 2+ on the left side.      " Heart sounds: No murmur heard.  Pulmonary:      Effort: Pulmonary effort is normal.      Breath sounds: Normal breath sounds. No wheezing, rhonchi or rales.   Abdominal:      General: Bowel sounds are normal. There is no abdominal bruit.      Palpations: Abdomen is soft.      Tenderness: There is no abdominal tenderness.   Genitourinary:     General: Normal vulva.      Vagina: Normal.      Cervix: Normal.      Uterus: Normal.    Musculoskeletal:         General: Normal range of motion.      Cervical back: Normal range of motion and neck supple.      Right knee: Swelling present. Tenderness present over the lateral joint line.      Left knee: No swelling. No tenderness.      Right lower leg: No edema.      Left lower leg: No edema.   Lymphadenopathy:      Cervical: No cervical adenopathy.   Skin:     General: Skin is warm and dry.   Neurological:      Mental Status: She is alert.      Sensory: Sensation is intact.      Motor: Motor function is intact.      Coordination: Coordination is intact.   Psychiatric:         Attention and Perception: Attention and perception normal.         Mood and Affect: Mood normal.         Speech: Speech normal.         Behavior: Behavior normal. Behavior is cooperative.         Thought Content: Thought content normal.         Judgment: Judgment normal.           Diagnostic Test Results:  Labs reviewed in Epic  Results for orders placed or performed in visit on 04/03/23   Hemoglobin A1c     Status: Abnormal   Result Value Ref Range    Hemoglobin A1C 6.0 (H) 0.0 - 5.6 %   CBC with platelets     Status: Normal   Result Value Ref Range    WBC Count 4.8 4.0 - 11.0 10e3/uL    RBC Count 4.47 3.80 - 5.20 10e6/uL    Hemoglobin 13.3 11.7 - 15.7 g/dL    Hematocrit 39.7 35.0 - 47.0 %    MCV 89 78 - 100 fL    MCH 29.8 26.5 - 33.0 pg    MCHC 33.5 31.5 - 36.5 g/dL    RDW 12.5 10.0 - 15.0 %    Platelet Count 225 150 - 450 10e3/uL       ASSESSMENT/PLAN:   (Z00.01) Encounter for general adult medical  examination with abnormal findings  (primary encounter diagnosis)  Comment: Pt declined TDaP and Covid vaccines today. followup in 1 year  Plan: CBC with platelets        (E78.5) Hyperlipidemia LDL goal <130  Comment: Monitor lipids  Plan: Lipid panel reflex to direct LDL Fasting, CBC         with platelets            (R73.09) Elevated glucose  Comment: Monitor A1C  Plan: Hemoglobin A1c, Basic metabolic panel  (Ca, Cl,        CO2, Creat, Gluc, K, Na, BUN), CBC with         platelets            (E03.9) Acquired hypothyroidism  Comment: Monitor TSH.   Plan: CBC with platelets, TSH with free T4 reflex            (Z96.651) S/P total knee arthroplasty, right  Comment: Overall pain improved- pain persists along lateral joint line with some edema. Pt has completed PT. Currently doing acupuncture for pain- feels it is helping   Plan: Followup with ortho as needed    (Z96.651) Status post total right knee replacement  Comment:   Plan: See above    (Z12.4) Screening for malignant neoplasm of cervix  Comment: Pap performed today.   Plan: Pap screen with HPV - recommended age 30 - 65         years            (Z12.31) Encounter for screening mammogram for breast cancer  Comment: Pt to schedule mammo  Plan: *MA Screening Digital Bilateral            (Z00.00) Routine general medical examination at a health care facility  Comment:  Plan:     (G47.00) Insomnia, unspecified type  Comment: Difficulty falling asleep for 1 year, with 4-6 hours of sleep a night. Pt endorses discomfort but no pain. Endorses snoring during night.   Plan: Start trazodone 50 mg HS     Adult Sleep Eval & Management          Referral            (R53.83) Fatigue, unspecified type  Comment: Daytime fatigue pt attributes to lack of sleep.   Plan: Adult Sleep Eval & Management          Referral        CBC            COUNSELING:  Reviewed preventive health counseling, as reflected in patient instructions       Immunizations    Declined TDaP and  "Covid-19 today.           BMI:   Estimated body mass index is 31.39 kg/m  as calculated from the following:    Height as of this encounter: 1.581 m (5' 2.25\").    Weight as of this encounter: 78.5 kg (173 lb).   Weight management plan: Discussed healthy diet and exercise guidelines      She reports that she has never smoked. She has never used smokeless tobacco.          Cindy Chance MD  [facility]  "

## 2023-04-05 LAB
BKR LAB AP GYN ADEQUACY: NORMAL
BKR LAB AP GYN INTERPRETATION: NORMAL
BKR LAB AP HPV REFLEX: NORMAL
BKR LAB AP PREVIOUS ABNORMAL: NORMAL
PATH REPORT.COMMENTS IMP SPEC: NORMAL
PATH REPORT.COMMENTS IMP SPEC: NORMAL
PATH REPORT.RELEVANT HX SPEC: NORMAL

## 2023-04-07 LAB
HUMAN PAPILLOMA VIRUS 16 DNA: NEGATIVE
HUMAN PAPILLOMA VIRUS 18 DNA: NEGATIVE
HUMAN PAPILLOMA VIRUS FINAL DIAGNOSIS: NORMAL
HUMAN PAPILLOMA VIRUS OTHER HR: NEGATIVE

## 2023-05-31 ENCOUNTER — OFFICE VISIT (OUTPATIENT)
Dept: FAMILY MEDICINE | Facility: CLINIC | Age: 63
End: 2023-05-31
Payer: COMMERCIAL

## 2023-05-31 VITALS
HEART RATE: 68 BPM | SYSTOLIC BLOOD PRESSURE: 125 MMHG | HEIGHT: 62 IN | DIASTOLIC BLOOD PRESSURE: 76 MMHG | OXYGEN SATURATION: 97 % | BODY MASS INDEX: 32.31 KG/M2 | TEMPERATURE: 97.5 F | WEIGHT: 175.6 LBS

## 2023-05-31 DIAGNOSIS — R06.83 SNORING: Primary | ICD-10-CM

## 2023-05-31 DIAGNOSIS — F51.01 PRIMARY INSOMNIA: ICD-10-CM

## 2023-05-31 DIAGNOSIS — E66.811 CLASS 1 OBESITY DUE TO EXCESS CALORIES WITH SERIOUS COMORBIDITY AND BODY MASS INDEX (BMI) OF 31.0 TO 31.9 IN ADULT: ICD-10-CM

## 2023-05-31 DIAGNOSIS — E66.09 CLASS 1 OBESITY DUE TO EXCESS CALORIES WITH SERIOUS COMORBIDITY AND BODY MASS INDEX (BMI) OF 31.0 TO 31.9 IN ADULT: ICD-10-CM

## 2023-05-31 PROCEDURE — 99213 OFFICE O/P EST LOW 20 MIN: CPT | Performed by: FAMILY MEDICINE

## 2023-05-31 ASSESSMENT — PAIN SCALES - GENERAL: PAINLEVEL: NO PAIN (0)

## 2023-05-31 NOTE — PROGRESS NOTES
"  Assessment & Plan     Snoring  Cad Try to lose wt  Handouts discussed   Can Try Breathe Right strips  It may not help  - Adult Sleep Eval & Management  Referral; Future    Primary insomnia  Can do melatonin 5 mg OTC    Class 1 obesity due to excess calories with serious comorbidity and body mass index (BMI) of 31.0 to 31.9 in adult  Discussed wt loss will help  - Adult Sleep Eval & Management  Referral; Future  Follow up ROSALINDA Goff MD  [facility] [place] is a 62 year old, presenting for the following health issues:  Sleep Problem  discussed via Phone       5/31/2023    10:49 AM   Additional Questions   Roomed by Rosaura KATZ     Insomnia  Onset/Duration: one year  Description:   Frequency of insomnia:  1-2 times a week  Time to fall asleep (sleep latency): 30 minutes  Middle of night awakening:  YES  Early morning awakening:  YES  Progression of Symptoms:  same  Accompanying Signs & Symptoms:  Daytime sleepiness/napping: YES, but not napping  Excessive snoring/apnea: YES  Restless legs: \"No, but feels heat coming from my legs\"  Waking to urinate: YES  Chronic pain:  No  Depression symptoms (if yes, do PHQ9): No  Anxiety symptoms (if yes, do SOURAV-7): No  History:  Prior Insomnia: No  New stressful situation: No  Precipitating factors:   Caffeine intake: YES one cup in the morning  OTC decongestants: No  Any new medications: No  Alleviating factors:  Self medicating (alcohol, etc.):  No  Stress-reduction (exercise, yoga, meditation etc): No  Therapies tried and outcome: none      Pt is worried that she snores  Does not know if she gasps for breath at Night  No sinus Problems  'she is obese      Review of Systems   CONSTITUTIONAL: NEGATIVE for fever, chills, change in weight  ENT/MOUTH: NEGATIVE for ear, mouth and throat problems  RESP: NEGATIVE for significant cough or SOB  CV: NEGATIVE for chest pain, palpitations or peripheral edema  ROS " otherwise negative      Objective    /76   Pulse 68   Temp 97.5  F (36.4  C) (Oral)   Wt 79.7 kg (175 lb 9.6 oz)   LMP  (LMP Unknown)   BMI 31.86 kg/m    Body mass index is 31.86 kg/m .  Physical Exam   GENERAL: healthy, alert and no distress  NECK: no adenopathy, no asymmetry, masses, or scars and thyroid normal to palpation  RESP: lungs clear to auscultation - no rales, rhonchi or wheezes  CV: regular rate and rhythm, normal S1 S2, no S3 or S4, no murmur, click or rub, no peripheral edema and peripheral pulses strong  ABDOMEN: soft, nontender, no hepatosplenomegaly, no masses and bowel sounds normal  MS: no gross musculoskeletal defects noted, no edema    none

## 2023-09-08 NOTE — LETTER
10/14/2022         RE: Gary Hinkle  [address] [place] [address]  [place] [address]        Dear Colleague,    Thank you for referring your patient, Gary Hinkle, to the [facility]. Please see a copy of my visit note below.    Chief Complaint   Patient presents with     Surgical Followup     Surgery on 9/27/2022  Total Right Knee Patient has been doing PT  2 x a week and she also does it at home     Today's encounter utilized a language specific  (via iPad) to obtain the HPI, PAST MEDICAL/SURGICAL history, social history, family history, medications and allergies and review of systems; in addition to perform physical examination; review pertinent imaging studies; discuss exam findings and assessment; and go over treatment plan.      SURGERY: Total knee arthroplasty, right knee ([facility])  DATE OF SURGERY: 9/27/2022      HISTORY OF PRESENT ILLNESS: Gary Hinkle is a 61 year old female seen for postoperative evaluation of right total knee arthroplasty. It has been 2 weeks since surgery. Returns today stating doing ok, getting better. Denies any wound problems. Denies numbness, tingling, or weakness in the affected extremity. Denies fevers chills night sweats. Continues to take aspirin for anti-coagulation for deep vein thrombosis prophylaxis. Use of pain medications has been tylenol only. Has been doing Physical Therapy. Concerns today include: none.    She's not been elevating.    Present symptoms: moderate pain, moderate swelling.    Pain severity: 6/10      Past medical history:   Past Medical History:   Diagnosis Date     Hyperlipidemia LDL goal < 130      Hypothyroidism      Lichen sclerosus of female genitalia 10/9/2015     Obesity      Patient Active Problem List    Diagnosis Date Noted     Primary osteoarthritis of right knee 09/27/2022     Priority: Medium     Obesity 04/13/2022     Priority: Medium     Lichen sclerosus of female genitalia 10/09/2015     Priority: Medium      [address] [facility] coding opportunities       Chart reviewed, no opportunity found: CHART REVIEWED, NO OPPORTUNITY FOUND        Patients Insurance     Medicare Insurance: Medicare 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     Priority: Medium     Hypothyroidism 09/08/2013     Priority: Medium     Positive thyroid antibodies         Hyperlipidemia LDL goal <130 09/06/2013     Priority: Medium       Past Surgical History:   Past Surgical History:   Procedure Laterality Date     APPENDECTOMY       ARTHROPLASTY KNEE Right 9/27/2022    Procedure: ARTHROPLASTY, RIGHT KNEE, TOTAL;  Surgeon: Shaka De Luna MD;  Location: [facility] OR       Medications:   Current Outpatient Medications:      acetaminophen (TYLENOL) 325 MG tablet, Take 2 tablets (650 mg) by mouth every 4 hours as needed for other (mild pain), Disp: 100 tablet, Rfl: 0     aspirin (ASA) 325 MG EC tablet, Take 1 tablet (325 mg) by mouth daily, Disp: 30 tablet, Rfl: 0     clobetasol (TEMOVATE) 0.05 % external ointment, Apply thin layer to effected area twice daily, Disp: 15 g, Rfl: 0     NP THYROID 60 MG tablet, TAKE ONE TABLET BY MOUTH ONE TIME DAILY, Disp: 90 tablet, Rfl: 0     oxyCODONE (ROXICODONE) 5 MG tablet, Take 1-2 tablets (5-10 mg) by mouth every 4 hours as needed for moderate to severe pain, Disp: 16 tablet, Rfl: 0     polyethylene glycol (MIRALAX) 17 GM/Dose powder, Take 17 g (1 capful) by mouth daily, Disp: 116 g, Rfl: 0     senna-docusate (SENOKOT-S/PERICOLACE) 8.6-50 MG tablet, Take 1-2 tablets by mouth 2 times daily Take while on oral narcotics to prevent or treat constipation., Disp: 30 tablet, Rfl: 0     simvastatin (ZOCOR) 10 MG tablet, Take 1 tablet (10 mg) by mouth At Bedtime, Disp: 90 tablet, Rfl: 3     vitamin D3 (CHOLECALCIFEROL) 10 MCG (400 UNIT) capsule, Take 1 capsule by mouth daily, Disp: , Rfl:     Allergies: No Known Allergies      REVIEW OF SYSTEMS:  CONSTITUTIONAL:NEGATIVE for fever, chills, night sweats, change in weight  INTEGUMENTARY/SKIN: NEGATIVE for worrisome rashes, moles or lesions  MUSCULOSKELETAL:See HPI above  NEURO: NEGATIVE for weakness, dizziness or paresthesias  CARDIOVASCULAR: negative for chest pain, shortness  of breath    PHYSICAL EXAM:  LMP  (LMP Unknown)    GENERAL APPEARANCE: healthy, alert, no distress  SKIN: no suspicious lesions or rashes  NEURO: Normal strength and tone, mentation intact and speech normal  PSYCH:  mentation appears normal and affect normal  RESPIRATORY: No increased work of breathing.    BILATERAL LOWER EXTREMITIES:  Gait: in wheelchair.    Intact sensation deep peroneal nerve, superficial peroneal nerve, med/lat tibial nerve, sural nerve, saphenous nerve  Intact EHL, EDL, TA, FHL, GS, quadriceps hamstrings and hip flexors  Toes warm and well perfused, brisk capillary refill. Palpable 2+ dp pulses.  Bilateral calf soft and nttp or squeeze.  Edema: 1+, pedal edema.    right  KNEE EXAM:    Surgical dressing in place.  Incision: healing well. No drainage. Dry.  Skin: intact, diffuse resolving ecchymosis, no erythema  3x5cm eschar from swelling blister lateral knee away from incision.  ROM: 7 extension to 65 flexion  Effusion: moderate   Tender: diffuse      X-RAY:  3 views right knee from 10/14/2022 were reviewed in clinic today. No obvious fractures or dislocations. Total knee arthroplasty components in place without evidence of failure or loosening.      Impression: Gary Hinkle is a 61 year old female status post Total knee arthroplasty, right knee, doing well, 2 weeks out from surgery.      Plan:   * reviewed xrays with patient, showing total knee arthroplasty implants.  * continue DVT prophylaxis for a total of 4 weeks postoperative  * continue with knee range of motion exercises, focusing on extension; more stiffness than typical at 2 weeks.  * wean off all pain medications as tolerated  * xrays next visit: 2 views of the knee  * return to clinic 4 weeks   * Physical Therapy, home exercise program daily.  * all questions addressed and answered prior to discharge from clinic today to patient's satisfaction.  * patient will need longterm prophylactic antibiotics use with dental procedures or other  invasive procedures (2 g amoxicilin or 450mg (3o630on) clindamycin) 1 hour prior to dental procedures.    * KOOS Jr/Promis Knee score: NOT  to be done at next visit; NOT completed during todays visit; to be completed at 12 weeks and 12 months postoperative.      Shaka De Luna M.D., M.S.  Dept. of Orthopaedic Surgery  [facility]            Again, thank you for allowing me to participate in the care of your patient.        Sincerely,        Shaka De Luna MD

## 2023-10-03 NOTE — PROGRESS NOTES
Chief Complaint   Patient presents with    Right Knee - Total Joint Post-op     DOS 9/27/22, 1 yr s/p. Patient notes her knee is doing not so bad. No issues or concerns. Patient will have a little pain when her knee gets tired. If she walks for a long time her knee will feel bloated at times.      Today's encounter utilized a language specific  (via iPad) to obtain the HPI, PAST MEDICAL/SURGICAL history, social history, family history, medications and allergies and review of systems; in addition to perform physical examination; review pertinent imaging studies; discuss exam findings and assessment; and go over treatment plan.      SURGERY: Total knee arthroplasty, right knee ([facility])  DATE OF SURGERY: 9/27/2022      HISTORY OF PRESENT ILLNESS: Gary Hinkle is a 62 year old female seen for postoperative evaluation of right total knee arthroplasty. It has been 12 months since surgery. Returns today stating doing well, not really any pain, only when gets tired or if she walks for a long time, will feel swollen.    She is overall pleased. She's glad she had it done.      Denies any wound problems. Denies numbness, tingling, or weakness in the affected extremity. Denies fevers chills night sweats.   Use of pain medications has been tylenol only.     Concerns today include: none.        Past medical history:   Past Medical History:   Diagnosis Date    Hyperlipidemia LDL goal < 130     Hypothyroidism     Lichen sclerosus of female genitalia 10/9/2015    Obesity      Patient Active Problem List    Diagnosis Date Noted    Primary insomnia 05/31/2023     Priority: Medium    Status post total right knee replacement 11/22/2022     Priority: Medium     9/2022, Dr Go      Obesity 04/13/2022     Priority: Medium    Lichen sclerosus of female genitalia 10/09/2015     Priority: Medium    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     Priority: Medium    Hypothyroidism 09/08/2013     Priority: Medium     Positive thyroid  "antibodies        Hyperlipidemia LDL goal <130 09/06/2013     Priority: Medium       Past Surgical History:   Past Surgical History:   Procedure Laterality Date    APPENDECTOMY      ARTHROPLASTY KNEE Right 9/27/2022    Procedure: ARTHROPLASTY, RIGHT KNEE, TOTAL;  Surgeon: Shaka De Luna MD;  Location: [facility] OR       Medications:   Current Outpatient Medications:     acetaminophen (TYLENOL) 325 MG tablet, Take 2 tablets (650 mg) by mouth every 4 hours as needed for other (mild pain), Disp: 100 tablet, Rfl: 0    NP THYROID 60 MG tablet, Take 1 tablet (60 mg) by mouth daily, Disp: 90 tablet, Rfl: 3    simvastatin (ZOCOR) 10 MG tablet, Take 1 tablet (10 mg) by mouth At Bedtime, Disp: 90 tablet, Rfl: 3    Vitamin D3 (CHOLECALCIFEROL) 125 MCG (5000 UT) tablet, Take 1 tablet by mouth daily, Disp: , Rfl:     Allergies: No Known Allergies      REVIEW OF SYSTEMS:  CONSTITUTIONAL:NEGATIVE for fever, chills, night sweats, change in weight  INTEGUMENTARY/SKIN: NEGATIVE for worrisome rashes, moles or lesions  MUSCULOSKELETAL:See HPI above  NEURO: NEGATIVE for weakness, dizziness or paresthesias  CARDIOVASCULAR: negative for chest pain, shortness of breath    PHYSICAL EXAM:  /76   Pulse 70   Ht 1.581 m (5' 2.24\")   Wt 81.1 kg (178 lb 12.8 oz)   LMP  (LMP Unknown)   BMI 32.45 kg/m     GENERAL APPEARANCE: healthy, alert, no distress; accompanied by her .  SKIN: no suspicious lesions or rashes  NEURO: Normal strength and tone, mentation intact and speech normal  PSYCH:  mentation appears normal and affect normal  RESPIRATORY: No increased work of breathing.    BILATERAL LOWER EXTREMITIES:  Gait: normal.  Bilateral calf soft and nttp or squeeze.    Right lower extremity:  Edema: trace    right  KNEE EXAM:    Incision: healed well. No drainage. Dry.    Skin: intact, no ecchymosis, no erythema  Nontender to palpation   ROM: near full extension to 110 flexion  Effusion: trace        X-RAY:  2 views right knee from " 10/4/2023  were reviewed in clinic today. No obvious fractures or dislocations. Total knee arthroplasty components in place without evidence of failure or loosening.        Impression: Gary Hinkle is a 62 year old female status post Total knee arthroplasty, right knee, doing well, 12 months out from surgery.      Plan:   * reviewed xrays with patient, showing total knee arthroplasty implants.  Glad to hear she continues to do well.  * continue with knee range of motion exercises   * xrays next visit: 2 views of the knee  * return to clinic 4 years  * all questions addressed and answered prior to discharge from clinic today to patient's satisfaction.  * patient will need longterm prophylactic antibiotics use with dental procedures or other invasive procedures (2 g amoxicilin or 450mg ([id number]) clindamycin) 1 hour prior to dental procedures.    * KOOS Jr/Promis Knee score: WAS completed during todays visit; to be completed at 12 weeks and 12 months postoperative.      Shaka De Luna M.D., M.S.  Dept. of Orthopaedic Surgery  [facility]

## 2023-10-04 ENCOUNTER — ANCILLARY PROCEDURE (OUTPATIENT)
Dept: MAMMOGRAPHY | Facility: CLINIC | Age: 63
End: 2023-10-04
Attending: INTERNAL MEDICINE
Payer: COMMERCIAL

## 2023-10-04 ENCOUNTER — OFFICE VISIT (OUTPATIENT)
Dept: ORTHOPEDICS | Facility: CLINIC | Age: 63
End: 2023-10-04
Payer: COMMERCIAL

## 2023-10-04 ENCOUNTER — ANCILLARY PROCEDURE (OUTPATIENT)
Dept: GENERAL RADIOLOGY | Facility: CLINIC | Age: 63
End: 2023-10-04
Attending: ORTHOPAEDIC SURGERY
Payer: COMMERCIAL

## 2023-10-04 VITALS
WEIGHT: 178.8 LBS | DIASTOLIC BLOOD PRESSURE: 76 MMHG | HEART RATE: 70 BPM | BODY MASS INDEX: 32.9 KG/M2 | SYSTOLIC BLOOD PRESSURE: 125 MMHG | HEIGHT: 62 IN

## 2023-10-04 DIAGNOSIS — Z12.31 ENCOUNTER FOR SCREENING MAMMOGRAM FOR BREAST CANCER: ICD-10-CM

## 2023-10-04 DIAGNOSIS — Z96.651 STATUS POST TOTAL RIGHT KNEE REPLACEMENT: ICD-10-CM

## 2023-10-04 DIAGNOSIS — Z96.651 STATUS POST TOTAL RIGHT KNEE REPLACEMENT: Primary | ICD-10-CM

## 2023-10-04 PROCEDURE — 99213 OFFICE O/P EST LOW 20 MIN: CPT | Performed by: ORTHOPAEDIC SURGERY

## 2023-10-04 PROCEDURE — 73560 X-RAY EXAM OF KNEE 1 OR 2: CPT | Mod: TC | Performed by: RADIOLOGY

## 2023-10-04 PROCEDURE — 77067 SCR MAMMO BI INCL CAD: CPT | Mod: TC | Performed by: RADIOLOGY

## 2023-10-04 ASSESSMENT — PAIN SCALES - GENERAL: PAINLEVEL: NO PAIN (0)

## 2023-10-04 NOTE — LETTER
10/4/2023         RE: Gary Hinkle  [address] [place] [address]  [place] [address]        Dear Colleague,    Thank you for referring your patient, Gary Hinkle, to the [facility]. Please see a copy of my visit note below.    Chief Complaint   Patient presents with     Right Knee - Total Joint Post-op     DOS 9/27/22, 1 yr s/p. Patient notes her knee is doing not so bad. No issues or concerns. Patient will have a little pain when her knee gets tired. If she walks for a long time her knee will feel bloated at times.      Today's encounter utilized a language specific  (via iPad) to obtain the HPI, PAST MEDICAL/SURGICAL history, social history, family history, medications and allergies and review of systems; in addition to perform physical examination; review pertinent imaging studies; discuss exam findings and assessment; and go over treatment plan.      SURGERY: Total knee arthroplasty, right knee ([facility])  DATE OF SURGERY: 9/27/2022      HISTORY OF PRESENT ILLNESS: Gary Hinkle is a 62 year old female seen for postoperative evaluation of right total knee arthroplasty. It has been 12 months since surgery. Returns today stating doing well, not really any pain, only when gets tired or if she walks for a long time, will feel swollen.    She is overall pleased. She's glad she had it done.      Denies any wound problems. Denies numbness, tingling, or weakness in the affected extremity. Denies fevers chills night sweats.   Use of pain medications has been tylenol only.     Concerns today include: none.        Past medical history:   Past Medical History:   Diagnosis Date     Hyperlipidemia LDL goal < 130      Hypothyroidism      Lichen sclerosus of female genitalia 10/9/2015     Obesity      Patient Active Problem List    Diagnosis Date Noted     Primary insomnia 05/31/2023     Priority: Medium     Status post total right knee replacement 11/22/2022     Priority: Medium     9/2022,   "[place]       Obesity 04/13/2022     Priority: Medium     Lichen sclerosus of female genitalia 10/09/2015     Priority: Medium     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     Priority: Medium     Hypothyroidism 09/08/2013     Priority: Medium     Positive thyroid antibodies         Hyperlipidemia LDL goal <130 09/06/2013     Priority: Medium       Past Surgical History:   Past Surgical History:   Procedure Laterality Date     APPENDECTOMY       ARTHROPLASTY KNEE Right 9/27/2022    Procedure: ARTHROPLASTY, RIGHT KNEE, TOTAL;  Surgeon: Shaka De Luna MD;  Location: [facility] OR       Medications:   Current Outpatient Medications:      acetaminophen (TYLENOL) 325 MG tablet, Take 2 tablets (650 mg) by mouth every 4 hours as needed for other (mild pain), Disp: 100 tablet, Rfl: 0     NP THYROID 60 MG tablet, Take 1 tablet (60 mg) by mouth daily, Disp: 90 tablet, Rfl: 3     simvastatin (ZOCOR) 10 MG tablet, Take 1 tablet (10 mg) by mouth At Bedtime, Disp: 90 tablet, Rfl: 3     Vitamin D3 (CHOLECALCIFEROL) 125 MCG (5000 UT) tablet, Take 1 tablet by mouth daily, Disp: , Rfl:     Allergies: No Known Allergies      REVIEW OF SYSTEMS:  CONSTITUTIONAL:NEGATIVE for fever, chills, night sweats, change in weight  INTEGUMENTARY/SKIN: NEGATIVE for worrisome rashes, moles or lesions  MUSCULOSKELETAL:See HPI above  NEURO: NEGATIVE for weakness, dizziness or paresthesias  CARDIOVASCULAR: negative for chest pain, shortness of breath    PHYSICAL EXAM:  /76   Pulse 70   Ht 1.581 m (5' 2.24\")   Wt 81.1 kg (178 lb 12.8 oz)   LMP  (LMP Unknown)   BMI 32.45 kg/m     GENERAL APPEARANCE: healthy, alert, no distress; accompanied by her .  SKIN: no suspicious lesions or rashes  NEURO: Normal strength and tone, mentation intact and speech normal  PSYCH:  mentation appears normal and affect normal  RESPIRATORY: No increased work of breathing.    BILATERAL LOWER EXTREMITIES:  Gait: normal.  Bilateral calf soft and nttp or squeeze.    Right lower " extremity:  Edema: trace    right  KNEE EXAM:    Incision: healed well. No drainage. Dry.    Skin: intact, no ecchymosis, no erythema  Nontender to palpation   ROM: near full extension to 110 flexion  Effusion: trace        X-RAY:  2 views right knee from 10/4/2023  were reviewed in clinic today. No obvious fractures or dislocations. Total knee arthroplasty components in place without evidence of failure or loosening.        Impression: Gary Hinkle is a 62 year old female status post Total knee arthroplasty, right knee, doing well, 12 months out from surgery.      Plan:   * reviewed xrays with patient, showing total knee arthroplasty implants.  Glad to hear she continues to do well.  * continue with knee range of motion exercises   * xrays next visit: 2 views of the knee  * return to clinic 4 years  * all questions addressed and answered prior to discharge from clinic today to patient's satisfaction.  * patient will need longterm prophylactic antibiotics use with dental procedures or other invasive procedures (2 g amoxicilin or 450mg ([id number]) clindamycin) 1 hour prior to dental procedures.    * KOOS Jr/Promis Knee score: WAS completed during todays visit; to be completed at 12 weeks and 12 months postoperative.      Shaka De Luna M.D., M.S.  Dept. of Orthopaedic Surgery  [facility]        Again, thank you for allowing me to participate in the care of your patient.        Sincerely,        Shaka De Luna MD

## 2023-12-13 ENCOUNTER — IMMUNIZATION (OUTPATIENT)
Dept: FAMILY MEDICINE | Facility: CLINIC | Age: 63
End: 2023-12-13
Payer: COMMERCIAL

## 2023-12-13 DIAGNOSIS — Z23 HIGH PRIORITY FOR 2019-NCOV VACCINE: ICD-10-CM

## 2023-12-13 DIAGNOSIS — Z23 NEED FOR PROPHYLACTIC VACCINATION AND INOCULATION AGAINST INFLUENZA: Primary | ICD-10-CM

## 2023-12-13 PROCEDURE — 90471 IMMUNIZATION ADMIN: CPT

## 2023-12-13 PROCEDURE — 90480 ADMN SARSCOV2 VAC 1/ONLY CMP: CPT

## 2023-12-13 PROCEDURE — 99207 PR NO CHARGE NURSE ONLY: CPT

## 2023-12-13 PROCEDURE — 91320 SARSCV2 VAC 30MCG TRS-SUC IM: CPT

## 2023-12-13 PROCEDURE — 90682 RIV4 VACC RECOMBINANT DNA IM: CPT

## 2024-01-10 ENCOUNTER — OFFICE VISIT (OUTPATIENT)
Dept: FAMILY MEDICINE | Facility: CLINIC | Age: 64
End: 2024-01-10
Payer: COMMERCIAL

## 2024-01-10 VITALS
DIASTOLIC BLOOD PRESSURE: 77 MMHG | WEIGHT: 168 LBS | OXYGEN SATURATION: 95 % | RESPIRATION RATE: 16 BRPM | BODY MASS INDEX: 30.91 KG/M2 | SYSTOLIC BLOOD PRESSURE: 123 MMHG | HEART RATE: 83 BPM | HEIGHT: 62 IN | TEMPERATURE: 97.2 F

## 2024-01-10 DIAGNOSIS — L23.9 ALLERGIC CONTACT DERMATITIS, UNSPECIFIED TRIGGER: Primary | ICD-10-CM

## 2024-01-10 PROCEDURE — 99213 OFFICE O/P EST LOW 20 MIN: CPT | Performed by: FAMILY MEDICINE

## 2024-01-10 RX ORDER — BENZOCAINE/MENTHOL 6 MG-10 MG
LOZENGE MUCOUS MEMBRANE
COMMUNITY
Start: 2024-01-04

## 2024-01-10 RX ORDER — CETIRIZINE HYDROCHLORIDE 10 MG/1
10 TABLET ORAL DAILY
Qty: 30 TABLET | Refills: 0 | Status: SHIPPED | OUTPATIENT
Start: 2024-01-10

## 2024-01-10 RX ORDER — PREDNISONE 20 MG/1
20 TABLET ORAL 2 TIMES DAILY
Qty: 10 TABLET | Refills: 0 | Status: SHIPPED | OUTPATIENT
Start: 2024-01-10 | End: 2024-01-15

## 2024-01-10 RX ORDER — TRIAMCINOLONE ACETONIDE 1 MG/G
OINTMENT TOPICAL 2 TIMES DAILY
Qty: 60 G | Refills: 1 | Status: SHIPPED | OUTPATIENT
Start: 2024-01-10 | End: 2024-05-01

## 2024-01-10 NOTE — PATIENT INSTRUCTIONS
You are due for RSV, tetanus and shingles shot  You can get these at the pharmacy   Sincerely,  Opal Goff MD

## 2024-01-10 NOTE — PROGRESS NOTES
"  Assessment & Plan     Allergic contact dermatitis, unspecified trigger  SEE EPIC care orders  The potential side effects of this medication have been discussed with the patient.  Call if any significant problems with these are experienced.    - triamcinolone (KENALOG) 0.1 % external ointment; Apply topically 2 times daily  - cetirizine (ZYRTEC) 10 MG tablet; Take 1 tablet (10 mg) by mouth daily  - predniSONE (DELTASONE) 20 MG tablet; Take 1 tablet (20 mg) by mouth 2 times daily for 5 days  Follow up 2  weeks if not better/sooner if worse  Isidra Goff MD  [facility] [place] is a 63 year old, presenting for the following health issues:  Rash        1/10/2024    12:06 PM   Additional Questions   Roomed by Rosaura       Rash  Associated symptoms include a rash.          ED/UC Followup:    Facility: [facility]   Date of visit: January 4, 2024  Reason for visit: Rash  Current Status: Rash on the neck has improved still has rash on arms.     Has had similar rash in the pst  No new exposures Known    Review of Systems   Skin:  Positive for rash.    Objective    /77   Pulse 83   Temp 97.2  F (36.2  C) (Temporal)   Resp 16   Ht 1.575 m (5' 2.01\")   Wt 76.2 kg (168 lb)   LMP  (LMP Unknown)   SpO2 95%   BMI 30.72 kg/m    Body mass index is 30.72 kg/m .  Physical Exam   GENERAL: healthy, alert and no distress  RESP: lungs clear to auscultation - no rales, rhonchi or wheezes  CV: regular rate and rhythm, normal S1 S2, no S3 or S4, no murmur, click or rub, no peripheral edema and peripheral pulses strong  MS: no gross musculoskeletal defects noted, no edema   Erythematous rash Bot arms  Some on neck  Pt states  neck  it is better          "

## 2024-03-04 ENCOUNTER — PATIENT OUTREACH (OUTPATIENT)
Dept: CARE COORDINATION | Facility: CLINIC | Age: 64
End: 2024-03-04
Payer: COMMERCIAL

## 2024-04-28 ENCOUNTER — HEALTH MAINTENANCE LETTER (OUTPATIENT)
Age: 64
End: 2024-04-28

## 2024-04-28 DIAGNOSIS — E03.9 ACQUIRED HYPOTHYROIDISM: ICD-10-CM

## 2024-04-29 RX ORDER — LEVOTHYROXINE, LIOTHYRONINE 38; 9 UG/1; UG/1
60 TABLET ORAL DAILY
Qty: 90 TABLET | Refills: 0 | Status: SHIPPED | OUTPATIENT
Start: 2024-04-29 | End: 2024-05-01

## 2024-04-30 ENCOUNTER — TELEPHONE (OUTPATIENT)
Dept: FAMILY MEDICINE | Facility: CLINIC | Age: 64
End: 2024-04-30
Payer: COMMERCIAL

## 2024-04-30 NOTE — TELEPHONE ENCOUNTER
Prior Authorization Retail Medication Request    Medication/Dose: NP Thyroid 60 mg Tab ACEL  Diagnosis and ICD code (if different than what is on RX):    New/renewal/insurance change PA/secondary ins. PA:  Previously Tried and Failed:    Rationale:      Insurance   [place] [employer] [place] [employer]  Insurance ID:  [id number]    Secondary (if applicable):  Insurance ID:      Pharmacy Information (if different than what is on RX)  Name:  [facility] [place]  Phone:  [phone]  Fax:[phone]

## 2024-05-01 ENCOUNTER — OFFICE VISIT (OUTPATIENT)
Dept: FAMILY MEDICINE | Facility: CLINIC | Age: 64
End: 2024-05-01
Payer: COMMERCIAL

## 2024-05-01 VITALS
SYSTOLIC BLOOD PRESSURE: 124 MMHG | RESPIRATION RATE: 16 BRPM | HEART RATE: 60 BPM | OXYGEN SATURATION: 98 % | BODY MASS INDEX: 31.2 KG/M2 | DIASTOLIC BLOOD PRESSURE: 77 MMHG | TEMPERATURE: 97.2 F | WEIGHT: 170.6 LBS

## 2024-05-01 DIAGNOSIS — Z12.11 SCREEN FOR COLON CANCER: ICD-10-CM

## 2024-05-01 DIAGNOSIS — E03.9 ACQUIRED HYPOTHYROIDISM: ICD-10-CM

## 2024-05-01 DIAGNOSIS — L23.9 ALLERGIC CONTACT DERMATITIS, UNSPECIFIED TRIGGER: ICD-10-CM

## 2024-05-01 DIAGNOSIS — E78.5 HYPERLIPIDEMIA LDL GOAL <130: ICD-10-CM

## 2024-05-01 DIAGNOSIS — E66.09 CLASS 1 OBESITY DUE TO EXCESS CALORIES WITH SERIOUS COMORBIDITY AND BODY MASS INDEX (BMI) OF 31.0 TO 31.9 IN ADULT: ICD-10-CM

## 2024-05-01 DIAGNOSIS — E66.811 CLASS 1 OBESITY DUE TO EXCESS CALORIES WITH SERIOUS COMORBIDITY AND BODY MASS INDEX (BMI) OF 31.0 TO 31.9 IN ADULT: ICD-10-CM

## 2024-05-01 LAB — HBA1C MFR BLD: 5.9 % (ref 0–5.6)

## 2024-05-01 PROCEDURE — 36415 COLL VENOUS BLD VENIPUNCTURE: CPT | Performed by: FAMILY MEDICINE

## 2024-05-01 PROCEDURE — 80061 LIPID PANEL: CPT | Performed by: FAMILY MEDICINE

## 2024-05-01 PROCEDURE — 84439 ASSAY OF FREE THYROXINE: CPT | Performed by: FAMILY MEDICINE

## 2024-05-01 PROCEDURE — 84443 ASSAY THYROID STIM HORMONE: CPT | Performed by: FAMILY MEDICINE

## 2024-05-01 PROCEDURE — G2211 COMPLEX E/M VISIT ADD ON: HCPCS | Performed by: FAMILY MEDICINE

## 2024-05-01 PROCEDURE — 82947 ASSAY GLUCOSE BLOOD QUANT: CPT | Performed by: FAMILY MEDICINE

## 2024-05-01 PROCEDURE — 83036 HEMOGLOBIN GLYCOSYLATED A1C: CPT | Performed by: FAMILY MEDICINE

## 2024-05-01 PROCEDURE — 99214 OFFICE O/P EST MOD 30 MIN: CPT | Performed by: FAMILY MEDICINE

## 2024-05-01 RX ORDER — LEVOTHYROXINE, LIOTHYRONINE 38; 9 UG/1; UG/1
60 TABLET ORAL DAILY
Qty: 90 TABLET | Refills: 3 | Status: SHIPPED | OUTPATIENT
Start: 2024-05-01

## 2024-05-01 RX ORDER — SIMVASTATIN 10 MG
10 TABLET ORAL AT BEDTIME
Qty: 90 TABLET | Refills: 3 | Status: SHIPPED | OUTPATIENT
Start: 2024-05-01

## 2024-05-01 RX ORDER — TRIAMCINOLONE ACETONIDE 1 MG/G
OINTMENT TOPICAL 2 TIMES DAILY
Qty: 60 G | Refills: 1 | Status: SHIPPED | OUTPATIENT
Start: 2024-05-01

## 2024-05-01 ASSESSMENT — PAIN SCALES - GENERAL: PAINLEVEL: NO PAIN (0)

## 2024-05-01 NOTE — LETTER
May 14, 2024    Gary Hinkle  [address] [place] [address]  [place] [address]          Dear ,    We are writing to inform you of your test results.  3 month sugar in the Prediabetic range   Please watch diet   Thyroid Borderline but stable   Decrease  Carbohydrates   Carbohydrates are milks, fruits, starches (such as bread, cereal, potatoes and pasta); peas and corn; and sweets and desserts. Eat these foods in moderation.   Tips for health:   - eat balanced meals with fruits, vegetables, starches, meats and milk products   - limit foods high in calories like cake, candy, cookies, pie and regular soda   - choose sugar-free beverages   - build a plate that is one-half vegetables, one-quarter starch, and one-quarter meat or other protein source   - consider fruit for dessert   - choose foods that have fiber, such as whole grains   - eat healthful fats such as olive oil or canola oil       Resulted Orders   HEMOGLOBIN A1C   Result Value Ref Range    Hemoglobin A1C 5.9 (H) 0.0 - 5.6 %      Comment:      Normal <5.7%   Prediabetes 5.7-6.4%    Diabetes 6.5% or higher     Note: Adopted from ADA consensus guidelines.   Lipid panel reflex to direct LDL Non-fasting   Result Value Ref Range    Cholesterol 199 <200 mg/dL    Triglycerides 146 <150 mg/dL    Direct Measure HDL 59 >=50 mg/dL    LDL Cholesterol Calculated 111 (H) <=100 mg/dL    Non HDL Cholesterol 140 (H) <130 mg/dL    Patient Fasting > 8hrs? Yes     Narrative    Cholesterol  Desirable:  <200 mg/dL    Triglycerides  Normal:  Less than 150 mg/dL  Borderline High:  150-199 mg/dL  High:  200-499 mg/dL  Very High:  Greater than or equal to 500 mg/dL    Direct Measure HDL  Female:  Greater than or equal to 50 mg/dL   Male:  Greater than or equal to 40 mg/dL    LDL Cholesterol  Desirable:  <100mg/dL  Above Desirable:  100-129 mg/dL   Borderline High:  130-159 mg/dL   High:  160-189 mg/dL   Very High:  >= 190 mg/dL    Non HDL Cholesterol  Desirable:  130 mg/dL  Above Desirable:   130-159 mg/dL  Borderline High:  160-189 mg/dL  High:  190-219 mg/dL  Very High:  Greater than or equal to 220 mg/dL   TSH WITH FREE T4 REFLEX   Result Value Ref Range    TSH 4.27 (H) 0.30 - 4.20 uIU/mL   Glucose   Result Value Ref Range    Glucose 106 (H) 70 - 99 mg/dL    Patient Fasting > 8hrs? Yes    T4 free   Result Value Ref Range    Free T4 1.03 0.90 - 1.70 ng/dL       If you have any questions or concerns, please call the clinic at the number listed above.       Sincerely,      Opal Goff MD

## 2024-05-01 NOTE — PROGRESS NOTES
"  Assessment & Plan     Hyperlipidemia LDL goal <130  Pending   - Lipid panel reflex to direct LDL Non-fasting; Future  - simvastatin (ZOCOR) 10 MG tablet; Take 1 tablet (10 mg) by mouth at bedtime  - Glucose; Future  - Lipid panel reflex to direct LDL Non-fasting  - Glucose    Acquired hypothyroidism  Pending labs will be reviewed   - TSH WITH FREE T4 REFLEX; Future  - NP THYROID 60 MG tablet; Take 1 tablet (60 mg) by mouth daily  - TSH WITH FREE T4 REFLEX    Class 1 obesity due to excess calories with serious comorbidity and body mass index (BMI) of 31.0 to 31.9 in adult  Exercise   - HEMOGLOBIN A1C; Future  - HEMOGLOBIN A1C    Screen for colon cancer  Advised   - Colonoscopy Screening  Referral; Future    Allergic contact dermatitis, unspecified trigger  Refilled  Follow up if not better  - triamcinolone (KENALOG) 0.1 % external ointment; Apply topically 2 times daily      The longitudinal plan of care for the diagnosis(es)/condition(s) as documented were addressed during this visit. Due to the added complexity in care, I will continue to support Gary in the subsequent management and with ongoing continuity of care.        BMI  Estimated body mass index is 31.2 kg/m  as calculated from the following:    Height as of 1/10/24: 1.575 m (5' 2.01\").    Weight as of this encounter: 77.4 kg (170 lb 9.6 oz).   Weight management plan: Exercise      Follow up Physical and mammogram    Subjective   Gary is a 63 year old, presenting for the following health issues: via a Phone Interpeter  Derm Problem (Rash from January not improving. itchy) and Recheck Medication        5/1/2024    10:31 AM   Additional Questions   Roomed by Rosaura KATZ       Hyperlipidemia Follow-Up    Are you regularly taking any medication or supplement to lower your cholesterol?   Yes- Simvastatin  Are you having muscle aches or other side effects that you think could be caused by your cholesterol lowering medication?  No    Hypothyroidism " Follow-up    Since last visit, patient describes the following symptoms: Weight stable, no hair loss, no skin changes, no constipation, no loose stools and dry skin  How many servings of fruits and vegetables do you eat daily?  2-3  On average, how many sweetened beverages do you drink each day (Examples: soda, juice, sweet tea, etc.  Do NOT count diet or artificially sweetened beverages)?   Very few  How many days per week do you exercise enough to make your heart beat faster? None but taking care of grandson at.   How many minutes a day do you exercise enough to make your heart beat faster?   How many days per week do you miss taking your medication? 0      Itchy rash arms and Right side of neck  Pt has History of contact dermatitis  No new exposure  Review of Systems  Constitutional, neuro, ENT, endocrine, pulmonary, cardiac, gastrointestinal, genitourinary, musculoskeletal, integument and psychiatric systems are negative, except as otherwise noted.      Objective    /77   Pulse 60   Temp 97.2  F (36.2  C) (Temporal)   Resp 16   Wt 77.4 kg (170 lb 9.6 oz)   LMP  (LMP Unknown)   SpO2 98%   BMI 31.20 kg/m    Body mass index is 31.2 kg/m .  Physical Exam   GENERAL: alert and no distress  EYES: Eyes grossly normal to inspection  NECK: no adenopathy, no asymmetry, masses, or scars  RESP: lungs clear to auscultation - no rales, rhonchi or wheezes  CV: regular rate and rhythm, normal S1 S2, no S3 or S4, no murmur, click or rub, no peripheral edema  ABDOMEN: soft, nontender, no hepatosplenomegaly, no masses and bowel sounds normal  MS: no gross musculoskeletal defects noted, no edema  PSYCH: mentation appears normal, affect normal/bright  Mild erythematous rash Both Forearm and hands and a small patch Right side of neck   Pending         Signed Electronically by: Opal Goff MD

## 2024-05-02 ENCOUNTER — TELEPHONE (OUTPATIENT)
Dept: FAMILY MEDICINE | Facility: CLINIC | Age: 64
End: 2024-05-02
Payer: COMMERCIAL

## 2024-05-02 LAB
CHOLEST SERPL-MCNC: 199 MG/DL
FASTING STATUS PATIENT QL REPORTED: YES
FASTING STATUS PATIENT QL REPORTED: YES
GLUCOSE SERPL-MCNC: 106 MG/DL (ref 70–99)
HDLC SERPL-MCNC: 59 MG/DL
LDLC SERPL CALC-MCNC: 111 MG/DL
NONHDLC SERPL-MCNC: 140 MG/DL
T4 FREE SERPL-MCNC: 1.03 NG/DL (ref 0.9–1.7)
TRIGL SERPL-MCNC: 146 MG/DL
TSH SERPL DL<=0.005 MIU/L-ACNC: 4.27 UIU/ML (ref 0.3–4.2)

## 2024-05-02 NOTE — TELEPHONE ENCOUNTER
RN received call from patients son Juan.    No consent to communicate on file. NO PHI shared    Patient son states patient picked up medications from Pharmacy but pharmacy did not have her thyroid.    RN advised sone to call pharmacy to discuss further.    Patient son verbalized understanding.    Russel Rojas, RN, BSN, PHN  [facility]

## 2024-05-07 NOTE — TELEPHONE ENCOUNTER
Central Prior Authorization Team  Phone: [phone]    PA Initiation    Medication: NP THYROID 60 MG PO TABS  Insurance Company: [employer] - Phone [phone] Fax [phone]  Pharmacy Filling the Rx: [id number] - [place]  Filling Pharmacy Phone: [phone]  Filling Pharmacy Fax:    Start Date: 5/7/2024

## 2024-05-08 NOTE — TELEPHONE ENCOUNTER
Central Prior Authorization Team  Phone: [phone]    Prior Authorization Approval    Medication: NP THYROID 60 MG PO TABS  Authorization Effective Date: 2/8/2024  Authorization Expiration Date: 5/8/2025  Approved Dose/Quantity:   Reference #:     Insurance Company: [employer] - Phone [phone] Fax [phone]  Expected CoPay: $    CoPay Card Available:      Financial Assistance Needed:   Which Pharmacy is filling the prescription: [id number] - [place], [address]  Pharmacy Notified: yes  Patient Notified: PHARMACY WILL NOTIFY PT WHEN READY

## 2024-06-13 ENCOUNTER — TELEPHONE (OUTPATIENT)
Dept: GASTROENTEROLOGY | Facility: CLINIC | Age: 64
End: 2024-06-13
Payer: COMMERCIAL

## 2024-06-13 NOTE — TELEPHONE ENCOUNTER
"PLEASE CALL ILIANA TRINIDAD [phone]    Endoscopy Scheduling Screen    Have you had a positive Covid test in the last 14 days?  No    What is your communication preference for Instructions and/or Bowel Prep?   Mail/USPS    What insurance is in the chart?  Other:  BP    Ordering/Referring Provider:     PHUC LAMBERT      (If ordering provider performs procedure, schedule with ordering provider unless otherwise instructed. )    BMI: Estimated body mass index is 31.2 kg/m  as calculated from the following:    Height as of 1/10/24: 1.575 m (5' 2.01\").    Weight as of 5/1/24: 77.4 kg (170 lb 9.6 oz).     Sedation Ordered  moderate sedation.   If patient BMI > 50 do not schedule in [facility].    If patient BMI > 45 do not schedule at [facility].    Are you taking methadone or Suboxone?  No    Have you had difficulties, pain, or discomfort during past endoscopy procedures?  No    Are you taking any prescription medications for pain 3 or more times per week?   NO, No RN review required.    Do you have a history of malignant hyperthermia?  No    (Females) Are you currently pregnant?        Have you been diagnosed or told you have pulmonary hypertension?   No    Do you have an LVAD?  No    Have you been told you have moderate to severe sleep apnea?  No    Have you been told you have COPD, asthma, or any other lung disease?  No    Do you have any heart conditions?  No     Have you ever had or are you waiting for an organ transplant?  No. Continue scheduling, no site restrictions.    Have you had a stroke or transient ischemic attack (TIA aka \"mini stroke\" in the last 6 months?   No    Have you been diagnosed with or been told you have cirrhosis of the liver?   No    Are you currently on dialysis?   No    Do you need assistance transferring?   No    BMI: Estimated body mass index is 31.2 kg/m  as calculated from the following:    Height as of 1/10/24: 1.575 m (5' 2.01\").    Weight as of 5/1/24: 77.4 kg (170 lb 9.6 oz).     Is patients BMI > " 40 and scheduling location [facility]?  No    Do you take an injectable medication for weight loss or diabetes (excluding insulin)?  No    Do you take the medication Naltrexone?  No    Do you take blood thinners?  No       Prep   Are you currently on dialysis or do you have chronic kidney disease?  No    Do you have a diagnosis of diabetes?  No    Do you have a diagnosis of cystic fibrosis (CF)?  No    On a regular basis do you go 3 -5 days between bowel movements?  No    BMI > 40?  No    Preferred Pharmacy:    [facility][id number] - [place] [address] [place] [facility]  [place] [address]  Phone: [phone] Fax: [phone]      Final Scheduling Details     Procedure scheduled  Colonoscopy    Surgeon:  ALEM     Date of procedure:  8/12     Pre-OP / PAC:   No - Not required for this site.    Location  [facility] - [facility] - Per order.    Sedation   Moderate Sedation - Per order.      Patient Reminders:   You will receive a call from a Nurse to review instructions and health history.  This assessment must be completed prior to your procedure.  Failure to complete the Nurse assessment may result in the procedure being cancelled.      On the day of your procedure, please designate an adult(s) who can drive you home stay with you for the next 24 hours. The medicines used in the exam will make you sleepy. You will not be able to drive.      You cannot take public transportation, ride share services, or non-medical taxi service without a responsible caregiver.  Medical transport services are allowed with the requirement that a responsible caregiver will receive you at your destination.  We require that drivers and caregivers are confirmed prior to your procedure.

## 2024-07-07 ENCOUNTER — HEALTH MAINTENANCE LETTER (OUTPATIENT)
Age: 64
End: 2024-07-07

## 2024-08-04 ENCOUNTER — TELEPHONE (OUTPATIENT)
Dept: GASTROENTEROLOGY | Facility: CLINIC | Age: 64
End: 2024-08-04
Payer: COMMERCIAL

## 2024-08-04 DIAGNOSIS — Z12.11 ENCOUNTER FOR SCREENING COLONOSCOPY: Primary | ICD-10-CM

## 2024-08-04 RX ORDER — BISACODYL 5 MG/1
TABLET, DELAYED RELEASE ORAL
Qty: 4 TABLET | Refills: 0 | Status: SHIPPED | OUTPATIENT
Start: 2024-08-04 | End: 2024-08-12

## 2024-08-04 NOTE — TELEPHONE ENCOUNTER
Upon review previous colonoscopy with CS completed with David Lassiter. 4mg versed and 150mcg fentanyl. Does not report how patient tolerated test nor that MAC recommended for future procedures.     During endoscopy scheduling questionnaire noted:  Have you had difficulties, pain, or discomfort during past endoscopy procedures?  No- patient reported.     Writer will send to scoping provider Dr. Torres to clarify if ok to proceed with CS if patient is ok with CS.     ---------------------------------------------------------------------------------------    Pre visit planning completed.    NOTE: per endoscopy scheduling telephone encounter:  PLEASE CALL ILIANA TRINIDAD [phone] - no consent to communicate on file.       Procedure details:    Patient scheduled for Colonoscopy on 8/12/24.     Arrival time: 0945. Procedure time 1030    Facility location: [facility]; [address] Floor, [place] [address]. Check in location: 2nd Floor at Surgery desk.    Sedation type: Conscious sedation     Pre op exam needed? No.    Indication for procedure: screening       Chart review:     Electronic implanted devices? No    Recent diagnosis of diverticulitis within the last 6 weeks? No      Medication review:    Diabetic? No    Anticoagulants? No    Weight loss medication/injectable? No GLP 1 medications per patient's medication list.  RN will verify with pre-assessment call.    NSAIDS? No NSAID medications per patient's medication list.  RN will verify with pre-assessment call.    Other medication HOLDING recommendations:  N/A      Prep for procedure:     Bowel prep recommendation: Standard Golytely. Updated. CRC team sent Standard Miralax prior to bowel prep recommendation change for language barrier. Instructions were sent via [employer] but patient is not [employer] active.     Bowel prep prescription sent to [facility] #[id number] - [place], [place] - [address] [place] [address] [address]  Due to: language barrier.     Prep  instructions sent via letter - sent 8/5/24. Patient may need another way of receiving instructions as they may not receive in time for procedure.         Kailyn Hart RN  Endoscopy Procedure Pre Assessment RN  [phone] option 4

## 2024-08-04 NOTE — LETTER
August 4, 2024      Gary Hinkle  [address] [place] [address]  [place] [address]              Dear Gary,          Colonoscopy     Procedure date: 8/12/24    Anticipated arrival time: 9:45 AM   (Please note that arrival times may change)    Facility location: [facility]; [address] Floor, [place] [address] - Check in location: 2nd Floor at Surgery desk      Important Procedure Reminders:     Prep Instructions:   Instructions on how to prepare for your upcoming procedure are found below. Please read instructions carefully. Deviation from instructions may result in less than desired outcomes and procedure may need to be rescheduled.   If you have additional questions regarding how to prepare for your upcoming procedure, contact our endoscopy pre assessment nurses at [phone] option 4 Monday through Friday 7:00am-5:00pm     Policy:   The medications used during the procedure will make you sleepy, so you won't be able to drive. On the day of your procedure, please have an adult ready to drive you home and stay with you for the next 6- 24 hours (the nurse review call will confirm hours needed based on sedation).   You can't use public transportation, ride-share services, or non-medical taxi services without a responsible caregiver. Medical transport services are okay, but a caregiver must be there to receive you at your destination.   Make sure your  and caregiver are confirmed before your procedure.      Day of procedure:  Please keep in mind that arrival times may change. Let your  know there might be a one-hour window for changes.  We ask that you please check in at the  with your . Your  should remain on campus.  Expect to be at the procedure center for about 1.5-2.5 hours.    Please do not wear jewelry (i.e. earrings, rings, necklaces, watches, etc). Leave your purse, billfold, credit cards, and other valuables at home.   Bring insurance card  and ID.     To cancel or reschedule your procedure:   Within 14 days of your procedure if you develop any flu-like symptoms (such as fever, cough, shortness of breath), COVID-19 like symptoms or exposure to COVID-19, contact our endoscopy team at [phone] option 4 to determine if procedure can be completed or needs to be delayed.   If you need to cancel or reschedule, our endoscopy scheduling team can be reached at [phone], option 2. Monday through Friday, 7:00am-5:00pm.      Medication Reminders:    Please note the following medication holding recommendations:   N/A    ----------------------------------------------------------------------------------------------------------------------------------------------------------------------------------------------------------------------------------------------------------------------    Standard Golytely (Colyte, Nulytely) Bowel Prep   Prep instructions for your colonoscopy   For prep questions, please call: [facility] - [address] Floor, [place] [address] - [phone] option 4    Bowel prep has been sent to [facility] #[id number] - [place] [address]      Please read these instructions carefully at least 7 days prior to your colonoscopy procedure. Be sure to follow all directions completely. The inside of your colon must be clean to allow for a complete examination for the presence of any growths, polyps, and/or abnormalities, as well as their biopsy or removal. A number of tips are included in order to make this part of the procedure as comfortable as possible.    Getting ready    prescription at the pharmacy. Endoscopy team will order this about 2 weeks before to your scheduled procedure. Included in the kit will be the followin Dulcolax (Bisacodyl) 5mg tablets  1 container of Polyethylene Glycol (Golytely, Colyte, Nulytely, Gavilyte-G)    A nurse will call you to go over your appointment  details and prep instructions. Not completing the nurse call could result with your appointment being cancelled.    You must arrange for an adult to drive you home after your exam. Your colonoscopy cannot be done unless you have a ride. If you need to use public transportation, someone must ride with you and stay with you for up to 24 hours.       7 days before procedure   Consult with your prescribing provider about stopping any:     Diabetic/Weight Loss Injectable Medication GLP-1 agonist (such as Exenatide (Byetta, Bydureon),  Mounjaro (Tirzepatide).  Ozempic (Semaglutide). Semaglutide. Symlin (Pamlintide), Tanzeum (Albiglutide). Tirzepatide-Weight Management (Zepbound), Trulicity (Dulaglutide), Victoza (Saxenda, Liraglutide), Wegovy (Semaglutide) please follow below guidelines for holding:    For weekly injection HOLD 7 days before procedure.  For once or twice a day injection HOLD the day before procedure and day of procedure.  For oral, daily dosing (Rybelsus) HOLD 7 days before procedure.    Blood thinning and/or anti platelet medications: such as Coumadin, Plavix, Xarelto, Eliquis, Lovenox or others, these medications may need to be stopped temporarily before your procedure.     If you take insulin for diabetes, ask your prescribing provider for instructions on how to manage this medication while preparing for a colonoscopy.     NSAIDs medications such as Sulindac, Celebrex, Mobic, Relafen or others may need to be stopped before the procedure. This will be discussed during nurse review call or you can reach out to your prescribing provider.     Stop taking iron (ferrous sulfate), multivitamins that contain iron, and/or fiber supplements (Metamucil, Benefiber, Psyllium husk powder, Fibercon, Bran, etc.).      Stop eating whole kernel corn, popcorn, nuts, and foods that contain seeds. These can stay in the colon for many days and they can clog up the colonoscope.       3 days before procedure     Begin a  low-fiber diet (see examples below). No Olestra (a fat substitute).    Consume no more than 10-15 grams of fiber each day.     It is important to stay hydrated. Drink at least eight 8-ounce glasses of water a day.      LOW FIBER DIET   You can have:   Do not have:    Starches: White bread, rolls, biscuits, croissants, [place] toast, white flour tortillas, waffles, pancakes, [place] citizen toast; white rice, noodles, pasta, macaroni; cooked and peeled potatoes; plain crackers, saltines; cooked farina or cream of rice; puffed rice, corn flakes, Rice Krispies, Special K      Vegetables: tender cooked and canned, vegetable broths     Fruits and fruit juices: Strained fruit juice, canned fruit without seeds or skin (not pineapple), applesauce, pear sauce, ripe bananas, melons (not watermelon)     Milk products: Milk (plain or flavored), cheese, cottage cheese, yogurt (no berries), custard, ice cream       Proteins: Tender, well-cooked ground beef, lamb, veal, ham, pork, chicken, turkey, fish or organ meat, Tofu, eggs, creamy peanut butter      Fats and condiments:  Margarine, butter, oils, mayonnaise, sour cream, salad dressing, plain gravy; spices, cooked herbs; sugar, clear jelly, honey, syrup      Snacks, sweets and drinks: Pretzels, hard candy; plain cakes and cookies (no nuts or seeds); gelatin, plain pudding, sherbet, Popsicles; coffee, tea, carbonated ( fizzy ) drinks    Starches: Breads or rolls that contain nuts, seeds or fruit; whole wheat or whole grain breads that contain more than 2 grams of fiber per serving; cornbread; corn or whole wheat tortillas; potatoes with skin; brown rice, wild rice, quinoa, kasha (buckwheat), and oatmeal      Vegetables: Any raw or steamed vegetables; vegetables with seeds; corn in any form      Fruits and fruit juices: Prunes, prune juice, raisins and other dried fruits, berries and other fruits with seeds, canned pineapple juices with pulp such as orange, grapefruit, pineapple or tomato  juice     Milk products: Any yogurt with nuts, seeds or berries      Proteins: Tough, fibrous meats with gristle; cooked dried beans, peas or lentils; crunchy peanut butter     Fats and condiments: Pickles, olives, relish, horseradish; jam, marmalade, preserves      Snacks, sweets and drinks: Popcorn, nuts, seeds, granola, coconut, candies made with nuts or seeds; all desserts that contain nuts, seeds, raisins and other dried fruits, coconut, whole grains or bran.                                               1 day before procedure     You can have a light, low-fiber breakfast. But drink only clear liquids after 9 a.m. (see list below).    Drink at least eight to ten 8-ounce glasses of water throughout the day. ? ? ? ? ? ? ? ?    Fill the container of Golytely with a full gallon of warm water. Cover and shake until well mixed. Chill for 3 hours in refrigerator until it is time to drink solution.    Stop taking NSAIDs pain relievers, such as Advil, Ibuprofen, Motrin, etc. You may take Tylenol.    CLEAR LIQUID DIET:  You can have: Do not have:    Water, tea, coffee (no milk or cream)   Soda pop, Gatorade (not red or purple)   Coconut water   Jell-O, Popsicles (no milk or fruit pieces - not red or purple)   Fat-free soup broth or bouillon   Plain hard candy, such as clear life savers (not red or purple)   Clear juices and fruit-flavored drinks, such as apple juice, white grape juice, Hi-C, and Rustam-Aid (not red or purple)  Milk or milk products such as ice cream, malts or shakes, or coffee creamer   Red or purple drinks of any kind such as cranberry juice, grape juice or Rustam-Aid. Avoid red or purple Jell-O, Popsicles, sorbet, sherbet and candy   Juices with pulp such as orange, grapefruit, pineapple or tomato juice   Cream soups of any kind   Alcohol and beer   Protein drinks or protein powder     Step 1     At 3 PM, take 2 Dulcolax (Bisacodyl) tablets.   At 6 PM, start drinking one 8-ounce glass of Golytely mixture  every 15 minutes until the container is HALF empty. Drink each glass quickly. Store the rest in the refrigerator.   Continue to drink clear liquids    Step 2     If you arrive for your procedure BEFORE 11 AM:  At 11 PM, take 2 Dulcolax (Bisacodyl) tablets  At 11 PM, start drinking the other half of the Golytely container. Drink one 8-ounce glass every 15 minutes until the container is empty. Drink each glass quickly.    If you arrive for your procedure AFTER 11 AM:  At 6 AM, take 2 Dulcolax (Bisacodyl) tablets  At 6 AM, start drinking the other half of the Golytely container. Drink one 8-ounce glass of Golytely mixture every 15 minutes until the container is empty. Drink each glass quickly.       Reminders While Drinking Laxatives:     After you start drinking the solution, stay near a toilet. You may have watery stools (diarrhea), mild cramping, bloating, and nausea. You may want to use Vaseline on the skin around your anus after each bowel movement or use wet wipes to prevent irritation. Bowel movements will be liquid and dark in color at first and then should turn clear yellow in color. Drinking the prepared solution may make you cold, wearing warm clothing can be helpful.    Some find it helpful to:  For added flavor, include a crystal light lemonade packet in each glass of Golytely, rather than mixing it into the entire prepared mixture.   In between each glass, try sucking on a lemon or a piece of hard candy to help diminish the flavor of the preparation.  Drinking from a straw can help minimize the taste of the prepared mixture.    If you have nausea or vomiting during drinking the solution, rinse your mouth with water and take a 15-30 minute break and then continue drinking solution.     Day of procedure     2 hours before your arrival time stop drinking all liquids, including water.   Do not smoke or swallow anything, including water or gum for at least 2 hours before your arrival time. This is a safety  issue. Your procedure could be cancelled if you do not follow directions.  No chewing tobacco 6 hours prior to procedure arrival time.     You may take your necessary morning medications with sips of water (4 ounces).   Do not take diabetes medicine by mouth until after your exam.  If you have asthma, bring your inhalers.  Please perform your nebulizer treatments and airway clearance therapy in the morning prior to the procedure (if applicable).    Arrive with a responsible adult who can drive you home and stay with you for up to 24 hours. The medications used during the procedure will make you sleepy, so you won't be able to drive yourself home.   You cannot use public transportation, ride-share services, or non-medical taxi services without a responsible caregiver. Medical transport services are okay, but a caregiver must be there to receive you at your destination.  Please check in with your  when you arrive. Drivers should stay on campus.    Expect to be at the procedure center for about 1.5-2.5 hours.    Do not wear jewelry (i.e. earrings, rings, necklaces, watches, etc.). Leave your purse, billfold, credit cards, and other valuables at home.      Bring insurance card and ID.       Answers to Commonly Asked Questions     How soon can I eat after the procedure?  You may resume your normal diet when you feel ready, unless advised otherwise by the doctor performing your procedure. We recommend starting with a light meal.   Do not drink alcohol for 24 hours after your procedure.  You may resume normal activities (work, exercise, etc.) after 24 hours.    How will I feel after the procedure?  It is normal to feel bloated and gassy after your procedure. Walking will help move the air through your colon. You can take non-aspirin pain relievers that contain acetaminophen (Tylenol).  If you are having sedation, we require a responsible adult to take you home for your safety. The sedation medicines used to relax you  during the procedure can impair your judgement and reaction time, and make you forgetful and possibly a little unsteady.  Do not drive, make any important decisions, or sign any legal documents for 24 hours after your procedure.    When will I get my test results?  You should have your procedure results and any lab results (if applicable) by letter, MyChart message, or phone call within 2 weeks. If you have any questions, please call the doctor that referred you for the procedure.    How do I know if my colon is cleaned out?   After completing the bowel prep, your bowel movements should be all liquid and yellow. Your bowel movements will look similar to urine in the toilet. If there are pieces of stool (poop) in the toilet, or if you can't see to the bottom of the toilet, please call our office for advice. Call [phone] and ask to speak with a nurse.    Why is the Golytely bowel prep taken in several steps?   The stool is flushed out by a large wave of fluid going through the colon. Just sipping a large volume of the solution will not achieve the desired result. Studies have shown that two smaller waves (or more in some cases) are better than one large one.      What if I need to cancel or reschedule my procedure?  Contact our endoscopy scheduling team at [phone], option 2. Monday through Friday, 7:00am-5:00pm.

## 2024-08-05 NOTE — TELEPHONE ENCOUNTER
"Response from scoping provider Dr. Torres:    \"If she did ok last time, we can try CS again\"    OK to proceed with procedure should patient be ok with sedation expectations.       Kailyn Hart RN  Endoscopy Procedure Pre Assessment   [phone] option 4   "

## 2024-08-05 NOTE — TELEPHONE ENCOUNTER
Attempted to contact patient in order to complete pre assessment questions via Mand[employer] .    Writer attempted to contact patient son Juan (no consent to communicate on file) and patient.       No answer. Left message to return call to [phone] option 4    Callback communication sent via "[employer]".    Corinne Kliber, RN  Endoscopy Procedure Pre Assessment RN  [phone] option 4

## 2024-08-07 RX ORDER — LIDOCAINE 40 MG/G
CREAM TOPICAL
Status: CANCELLED | OUTPATIENT
Start: 2024-08-07

## 2024-08-07 RX ORDER — ONDANSETRON 2 MG/ML
4 INJECTION INTRAMUSCULAR; INTRAVENOUS
Status: CANCELLED | OUTPATIENT
Start: 2024-08-07

## 2024-08-07 NOTE — TELEPHONE ENCOUNTER
Second call attempt to complete pre assessment.     Spoke to son Juan (no c2c on file), he is not with mother at this time will call back when with her.  Gave information to return call to [phone] #4 by next business day prior to 4PM or procedure will be sent to cancel.     Callback required communication sent via [employer].      Corinne Kliber, RN  Endoscopy Procedure Pre Assessment

## 2024-08-09 ENCOUNTER — TELEPHONE (OUTPATIENT)
Dept: GASTROENTEROLOGY | Facility: CLINIC | Age: 64
End: 2024-08-09
Payer: COMMERCIAL

## 2024-08-09 NOTE — TELEPHONE ENCOUNTER
Caller: No call    Reason for Reschedule/Cancellation   (please be detailed, any staff messages or encounters to note?): Cancellation policy - pre-assessment call not completed.      Prior to reschedule please review:  Ordering Provider: Opal Goff  Sedation Determined: CS  Does patient have any ASC Exclusions, please identify?: No      Notes on Cancelled Procedure:  Procedure: Lower Endoscopy [Colonoscopy]   Date: 8/12/2024  Location: [facility]; [address] [place] [address] [address], [place] [address]   Surgeon: Melissa      Rescheduled: No, sent MyChart and CTL.        Did you cancel or rescheduled an EUS procedure? No.     CASE IN DEPOT

## 2024-08-09 NOTE — TELEPHONE ENCOUNTER
Patient was added back onto the schedule by scheduling team and blind transferred to pre assessment line.     Patient and son on the line.     Connected with Mandarin  ID#: [id number].    Received verbal consent from patient to speak with patient and son on the line as well.     -----------------------------------------------    Pre assessment completed for upcoming procedure.      Procedure details:    Patient scheduled for Colonoscopy on 8/12/24.     Arrival time: 0730. Procedure time 0815    Facility location: [facility]; [address], [place] [address]. Check in location: 2nd Floor at Surgery desk.    Sedation type: Conscious sedation     Pre op exam needed? No.    Indication for procedure: Screening    Designated  policy reviewed. Instructed to have someone stay 6 hours post procedure.       Chart review:     Electronic implanted devices? No    Recent diagnosis of diverticulitis within the last 6 weeks?  No      Medication review:    Diabetic? No    Anticoagulants? No    Weight loss medication/injectable? No.    NSAIDS? No    Other medication HOLDING recommendations:  N/A      Prep for procedure:     Bowel prep recommendation: Standard Golytely. Bowel prep prescription previously sent to [id number] [address]. Per son, he will  at the pharmacy today.  Due to: language barrier.     Prep instructions sent via [employer] per son request.     Reviewed procedure prep instructions.     Patient and family member verbalized understanding and had no questions or concerns at this time.        Dacia Santos RN  Endoscopy Procedure Pre Assessment   [phone] option 4

## 2024-08-09 NOTE — TELEPHONE ENCOUNTER
**added back on for same day     **new check in of 7:30 am + MARISELA Torres    Rescheduled:     Procedure: Lower Endoscopy [Colonoscopy]   Date: 8/12/2024  Location: [facility]; [address], [place] [address]    Surgeon: Melissa Del Cid                Did you cancel or rescheduled an EUS procedure? No.

## 2024-08-09 NOTE — TELEPHONE ENCOUNTER
----- Message from Corinne K sent at 8/9/2024  8:15 AM CDT -----  Regarding: Cancel per policy  Hello there    Pre assessment was not completed for upcoming Colonoscopy scheduled on 8/12/24.    Please cancel per policy.       Thank you,   Corinne Kliber, RN  Endoscopy Procedure Pre Assessment   [phone] option 4

## 2024-08-09 NOTE — TELEPHONE ENCOUNTER
No return call received.   Pre assessment was not completed for upcoming scheduled procedure.     Staff message sent to endoscopy scheduling to cancel procedure per policy.       Corinne Kliber, RN   Endoscopy Procedure Pre Assessment

## 2024-08-12 ENCOUNTER — HOSPITAL ENCOUNTER (OUTPATIENT)
Facility: AMBULATORY SURGERY CENTER | Age: 64
Discharge: HOME | End: 2024-08-12
Attending: STUDENT IN AN ORGANIZED HEALTH CARE EDUCATION/TRAINING PROGRAM | Admitting: STUDENT IN AN ORGANIZED HEALTH CARE EDUCATION/TRAINING PROGRAM
Payer: COMMERCIAL

## 2024-08-12 VITALS
DIASTOLIC BLOOD PRESSURE: 70 MMHG | RESPIRATION RATE: 16 BRPM | SYSTOLIC BLOOD PRESSURE: 130 MMHG | TEMPERATURE: 97.2 F | OXYGEN SATURATION: 90 % | HEART RATE: 51 BPM

## 2024-08-12 LAB — COLONOSCOPY: NORMAL

## 2024-08-12 PROCEDURE — G8907 PT DOC NO EVENTS ON DISCHARG: HCPCS

## 2024-08-12 PROCEDURE — 88305 TISSUE EXAM BY PATHOLOGIST: CPT | Performed by: PATHOLOGY

## 2024-08-12 PROCEDURE — 45385 COLONOSCOPY W/LESION REMOVAL: CPT

## 2024-08-12 PROCEDURE — G8918 PT W/O PREOP ORDER IV AB PRO: HCPCS

## 2024-08-12 RX ORDER — FENTANYL CITRATE 50 UG/ML
INJECTION, SOLUTION INTRAMUSCULAR; INTRAVENOUS PRN
Status: DISCONTINUED | OUTPATIENT
Start: 2024-08-12 | End: 2024-08-12 | Stop reason: HOSPADM

## 2024-08-12 RX ORDER — NALOXONE HYDROCHLORIDE 0.4 MG/ML
0.4 INJECTION, SOLUTION INTRAMUSCULAR; INTRAVENOUS; SUBCUTANEOUS
OUTPATIENT
Start: 2024-08-12

## 2024-08-12 RX ORDER — NALOXONE HYDROCHLORIDE 0.4 MG/ML
0.2 INJECTION, SOLUTION INTRAMUSCULAR; INTRAVENOUS; SUBCUTANEOUS
OUTPATIENT
Start: 2024-08-12

## 2024-08-12 RX ORDER — PROCHLORPERAZINE MALEATE 10 MG
10 TABLET ORAL EVERY 6 HOURS PRN
OUTPATIENT
Start: 2024-08-12

## 2024-08-12 RX ORDER — ONDANSETRON 4 MG/1
4 TABLET, ORALLY DISINTEGRATING ORAL EVERY 6 HOURS PRN
OUTPATIENT
Start: 2024-08-12

## 2024-08-12 RX ORDER — ONDANSETRON 2 MG/ML
4 INJECTION INTRAMUSCULAR; INTRAVENOUS EVERY 6 HOURS PRN
OUTPATIENT
Start: 2024-08-12

## 2024-08-12 RX ORDER — FLUMAZENIL 0.1 MG/ML
0.2 INJECTION, SOLUTION INTRAVENOUS
OUTPATIENT
Start: 2024-08-12 | End: 2024-08-12

## 2024-08-12 NOTE — H&P
[facility]  [id number]  female  63 year old      Reason for procedure/surgery: screening colonoscopy  Normal colonoscopy 2014  No family history    Patient Active Problem List   Diagnosis    Hyperlipidemia LDL goal <130    HDL deficiency    Hypothyroidism    Lichen sclerosus of female genitalia    Obesity    Status post total right knee replacement    Primary insomnia       Past Surgical History:    Past Surgical History:   Procedure Laterality Date    APPENDECTOMY      ARTHROPLASTY KNEE Right 9/27/2022    Procedure: ARTHROPLASTY, RIGHT KNEE, TOTAL;  Surgeon: Shaka De Luna MD;  Location: [facility]       Past Medical History:   Past Medical History:   Diagnosis Date    Hyperlipidemia LDL goal < 130     Hypothyroidism     Lichen sclerosus of female genitalia 10/9/2015    Obesity        Social History:   Social History     Tobacco Use    Smoking status: Never    Smokeless tobacco: Never   Substance Use Topics    Alcohol use: No       Family History:   Family History   Problem Relation Age of Onset    C.A.D. Mother         d age 80       Allergies: No Known Allergies    Active Medications:   Current Outpatient Medications   Medication Sig Dispense Refill    bisacodyl (DULCOLAX) 5 MG EC tablet Take 2 tablets at 3 pm the day before your procedure. If your procedure is before 11 am, take 2 additional tablets at 11 pm. If your procedure is after 11 am, take 2 additional tablets at 6 am. For additional instructions refer to your colonoscopy prep instructions. 4 tablet 0    cetirizine (ZYRTEC) 10 MG tablet Take 1 tablet (10 mg) by mouth daily 30 tablet 0    hydrocortisone (CORTAID) 1 % external cream Apply topically to affected area(s) two times daily.      NP THYROID 60 MG tablet Take 1 tablet (60 mg) by mouth daily 90 tablet 3    polyethylene glycol (GOLYTELY) 236 g suspension The night before the exam at 6 pm drink an 8-ounce glass every 15 minutes until the jug is half empty. If you arrive before 11 AM: Drink the other  half of the App[employer] jug at 11 PM night before procedure. If you arrive after 11 AM: Drink the other half of the Guroo jug at 6 AM day of procedure. For additional instructions refer to your colonoscopy prep instructions. 4000 mL 0    triamcinolone (KENALOG) 0.1 % external ointment Apply topically 2 times daily 60 g 1    Vitamin D3 (CHOLECALCIFEROL) 125 MCG (5000 UT) tablet Take 1 tablet by mouth daily      acetaminophen (TYLENOL) 325 MG tablet Take 2 tablets (650 mg) by mouth every 4 hours as needed for other (mild pain) 100 tablet 0    simvastatin (ZOCOR) 10 MG tablet Take 1 tablet (10 mg) by mouth at bedtime 90 tablet 3       Systemic Review:   CONSTITUTIONAL: NEGATIVE for fever, chills, change in weight  ENT/MOUTH: NEGATIVE for ear, mouth and throat problems  RESP: NEGATIVE for significant cough or SOB  CV: NEGATIVE for chest pain, palpitations or peripheral edema    Physical Examination:   Vital Signs: /68   Temp 97.2  F (36.2  C) (Temporal)   Resp 16   LMP  (LMP Unknown)   SpO2 94%   GENERAL: healthy, alert and no distress  NECK: no adenopathy, no asymmetry, masses, or scars  RESP: lungs clear to auscultation - no rales, rhonchi or wheezes  CV: regular rate and rhythm, normal S1 S2, no S3 or S4, no murmur, click or rub, no peripheral edema and peripheral pulses strong  ABDOMEN: soft, nontender, no hepatosplenomegaly, no masses and bowel sounds normal  MS: no gross musculoskeletal defects noted, no edema      Plan: Appropriate to proceed as scheduled.      Umu Torres MD  8/12/2024    PCP:  Opal Goff

## 2024-08-12 NOTE — LETTER
August 14, 2024      Gary Hinkle  [address] [place] [address]  [place] [address]        Dear ,    We are writing to inform you of your test results.    A least one of the polyps removed during your recent colonoscopy was found to be an adenoma - this is considered to be a precancerous polyp.  Please note there was NO cancer in the polyp.     Given the finding of this type of polyp, you are at higher risk for growing precancerous polyps in the future. I recommend that you undergo a repeat colonoscopy in 3 years. However, a sooner examination might be necessary if you start developing any symptoms such as rectal bleeding, change in bowel habits, anemia, etc.  Please discuss this with your primary physician at the time of your next office appointment.  Your primary physician will schedule this repeat colonoscopy at the appropriate time.    Please share this information with your family members so they can discuss with their primary physician their need for colorectal cancer screening.      It has been a pleasure to participate in your care.  Please call our clinic if you have any questions or concerns.        Resulted Orders   Surgical Pathology Exam   Result Value Ref Range    Case Report       Surgical Pathology Report                         Case: [id number]                                  Authorizing Provider:  Umu Torres MD          Collected:           08/12/2024 08:28 AM          Ordering Location:     [facility]    Received:            08/12/2024 12:03 PM                                 [place]                                                                     Pathologist:           Hesham Amaro MD                                                             Specimens:   A) - Large Intestine, Colon, Ascending                                                              B) - Large Intestine, Colon, Transverse, polyp x3                                                   C) - Large Intestine, Colon,  "Descending, polyp x2                                          Final Diagnosis       A. LARGE INTESTINE, COLON, ASCENDING:  - Tubular adenoma; negative for high-grade dysplasia    B. TRANSVERSE COLON POLYP X3:  - Tubular adenoma(s) ; negative for high-grade dysplasia    C. DESCENDING COLON POLYP X2:  - Tubular adenoma; negative for high-grade dysplasia        Clinical Information       Screen for colon cancer [Z12.11]      Gross Description       A(1). Large Intestine, Colon, Ascending, Large Intestine, Colon, Ascending:  The specimen is received in formalin with proper patient identification labeled \" colon, ascending polyp\".  The specimen consists of a single tan piece of soft tissue, measuring 0.4 cm in greatest dimension.  Entirely submitted in cassette A1.    B(2). Large Intestine, Colon, Transverse, polyp x3:  The specimen is received in formalin with proper patient identification labeled \" colon, transverse polyp x 3\".  The specimen consists of 3 tan pieces of soft tissue, ranging from 0.2-0.5 cm in greatest dimension.  Entirely submitted in cassette B1.    C(3). Large Intestine, Colon, Descending, polyp x2:  The specimen is received in formalin with proper patient identification labeled \" colon, descending polyp x 2\".  The specimen consists of 2 tan pieces of soft tissue, measuring 0.3 cm and 1.7 cm in greatest dimension.  The largest piece has resected surface that is inked blue and it is sectioned.  Entirely submitted in cassette C1.        Microscopic Description       Microscopic examination is performed.      Case was reviewed by the following:  Resident Pathologist: Feliz Alexander MD  A resident or fellow in a training program was involved in the initial review, preparation, and/or interpretation of this case.  I, as the senior physician, attest that I have personally reviewed all specimens and or slides, including the listed special stains, and used them with my medical judgement to determine the " final diagnosis.              Performing Labs       The technical component of this testing was completed at [facility].    Stain controls for all stains resulted within this report have been reviewed and show appropriate reactivity.       Case Images         If you have any questions or concerns, please call the clinic at the number listed above.       Sincerely,      Umu Torres MD

## 2024-08-14 LAB
PATH REPORT.COMMENTS IMP SPEC: NORMAL
PATH REPORT.COMMENTS IMP SPEC: NORMAL
PATH REPORT.FINAL DX SPEC: NORMAL
PATH REPORT.GROSS SPEC: NORMAL
PATH REPORT.MICROSCOPIC SPEC OTHER STN: NORMAL
PATH REPORT.RELEVANT HX SPEC: NORMAL
PHOTO IMAGE: NORMAL

## 2024-08-26 ENCOUNTER — PATIENT OUTREACH (OUTPATIENT)
Dept: GASTROENTEROLOGY | Facility: CLINIC | Age: 64
End: 2024-08-26
Payer: COMMERCIAL

## 2024-09-30 ENCOUNTER — PATIENT OUTREACH (OUTPATIENT)
Dept: CARE COORDINATION | Facility: CLINIC | Age: 64
End: 2024-09-30
Payer: COMMERCIAL

## 2024-11-18 ENCOUNTER — MYC MEDICAL ADVICE (OUTPATIENT)
Dept: FAMILY MEDICINE | Facility: CLINIC | Age: 64
End: 2024-11-18
Payer: COMMERCIAL

## 2024-11-18 NOTE — TELEPHONE ENCOUNTER
Patient Quality Outreach    Patient is due for the following:   Physical Annual Wellness Visit    Action(s) Taken:   Schedule a Annual Wellness Visit    Type of outreach:    Sent [employer] message.    Questions for provider review:    None           BAO SEGUNDO MA  Chart routed to none.

## 2025-01-11 ENCOUNTER — HEALTH MAINTENANCE LETTER (OUTPATIENT)
Age: 65
End: 2025-01-11

## 2025-02-25 ENCOUNTER — OFFICE VISIT (OUTPATIENT)
Dept: FAMILY MEDICINE | Facility: CLINIC | Age: 65
End: 2025-02-25
Payer: COMMERCIAL

## 2025-02-25 VITALS
OXYGEN SATURATION: 97 % | BODY MASS INDEX: 32.39 KG/M2 | RESPIRATION RATE: 16 BRPM | WEIGHT: 176 LBS | SYSTOLIC BLOOD PRESSURE: 120 MMHG | DIASTOLIC BLOOD PRESSURE: 75 MMHG | HEART RATE: 77 BPM | HEIGHT: 62 IN | TEMPERATURE: 97 F

## 2025-02-25 DIAGNOSIS — E78.5 HYPERLIPIDEMIA LDL GOAL <130: ICD-10-CM

## 2025-02-25 DIAGNOSIS — E03.9 ACQUIRED HYPOTHYROIDISM: ICD-10-CM

## 2025-02-25 DIAGNOSIS — Z00.00 ROUTINE GENERAL MEDICAL EXAMINATION AT A HEALTH CARE FACILITY: ICD-10-CM

## 2025-02-25 DIAGNOSIS — Z78.0 ASYMPTOMATIC POSTMENOPAUSAL STATUS: ICD-10-CM

## 2025-02-25 DIAGNOSIS — Z12.31 VISIT FOR SCREENING MAMMOGRAM: ICD-10-CM

## 2025-02-25 DIAGNOSIS — R73.9 HYPERGLYCEMIA: ICD-10-CM

## 2025-02-25 DIAGNOSIS — E03.9 HYPOTHYROIDISM, UNSPECIFIED TYPE: ICD-10-CM

## 2025-02-25 LAB
CHOLEST SERPL-MCNC: 240 MG/DL
EST. AVERAGE GLUCOSE BLD GHB EST-MCNC: 128 MG/DL
FASTING STATUS PATIENT QL REPORTED: NO
FASTING STATUS PATIENT QL REPORTED: NO
GLUCOSE SERPL-MCNC: 104 MG/DL (ref 70–99)
HBA1C MFR BLD: 6.1 % (ref 0–5.6)
HDLC SERPL-MCNC: 63 MG/DL
LDLC SERPL CALC-MCNC: 137 MG/DL
NONHDLC SERPL-MCNC: 177 MG/DL
TRIGL SERPL-MCNC: 199 MG/DL
TSH SERPL DL<=0.005 MIU/L-ACNC: 4.02 UIU/ML (ref 0.3–4.2)

## 2025-02-25 PROCEDURE — 82947 ASSAY GLUCOSE BLOOD QUANT: CPT | Performed by: FAMILY MEDICINE

## 2025-02-25 PROCEDURE — 84443 ASSAY THYROID STIM HORMONE: CPT | Performed by: FAMILY MEDICINE

## 2025-02-25 PROCEDURE — 80061 LIPID PANEL: CPT | Performed by: FAMILY MEDICINE

## 2025-02-25 PROCEDURE — 36415 COLL VENOUS BLD VENIPUNCTURE: CPT | Performed by: FAMILY MEDICINE

## 2025-02-25 PROCEDURE — 99214 OFFICE O/P EST MOD 30 MIN: CPT | Mod: 25 | Performed by: FAMILY MEDICINE

## 2025-02-25 PROCEDURE — 99397 PER PM REEVAL EST PAT 65+ YR: CPT | Performed by: FAMILY MEDICINE

## 2025-02-25 PROCEDURE — 83036 HEMOGLOBIN GLYCOSYLATED A1C: CPT | Performed by: FAMILY MEDICINE

## 2025-02-25 PROCEDURE — G2211 COMPLEX E/M VISIT ADD ON: HCPCS | Performed by: FAMILY MEDICINE

## 2025-02-25 RX ORDER — SIMVASTATIN 10 MG
10 TABLET ORAL AT BEDTIME
Qty: 90 TABLET | Refills: 3 | Status: SHIPPED | OUTPATIENT
Start: 2025-02-25 | End: 2025-02-26

## 2025-02-25 RX ORDER — LEVOTHYROXINE, LIOTHYRONINE 38; 9 UG/1; UG/1
60 TABLET ORAL DAILY
Qty: 90 TABLET | Refills: 3 | Status: SHIPPED | OUTPATIENT
Start: 2025-02-25

## 2025-02-25 ASSESSMENT — PAIN SCALES - GENERAL: PAINLEVEL_OUTOF10: NO PAIN (0)

## 2025-02-25 NOTE — PATIENT INSTRUCTIONS
Patient Education    Learning About Risk for Heart Attack and Stroke (01:56)  Your health professional recommends that you watch this short online health video.  Learn what raises your risk for having a heart attack or stroke and how you can lower your risk.   Purpose: Explains risk factors for heart attack and stroke and ways to lower the risk.  Goal: Users will understand what it means to be at risk for having a heart attack or stroke and what they can do to reduce their risk.    Watch: Scan the QR code or visit the link to view video       https://hwi.se/r/J7kxnkcfepnqq  Current as of: July 31, 2024  Content Version: 14.3    2024 [employer].   Care instructions adapted under license by your healthcare professional. If you have questions about a medical condition or this instruction, always ask your healthcare professional. [employer] disclaims any warranty or liability for your use of this information.    Eating Healthy Foods: Care Instructions  With every meal, you can make healthy food choices. Try to eat a variety of fruits, vegetables, whole grains, lean proteins, and low-fat dairy products. This can help you get the right balance of nutrients, including vitamins and minerals. Small changes add up over time. You can start by adding one healthy food to your meals each day.    Try to make half your plate fruits and vegetables, one-fourth whole grains, and one-fourth lean proteins. Try including dairy with your meals.   Eat more fruits and vegetables. Try to have them with most meals and snacks.   Foods for healthy eating        Fruits   These can be fresh, frozen, canned, or dried.  Try to choose whole fruit rather than fruit juice.  Eat a variety of colors.        Vegetables   These can be fresh, frozen, canned, or dried.  Beans, peas, and lentils count too.        Whole grains   Choose whole-grain breads, cereals, and noodles.  Try brown rice.        Lean proteins   These can include  "lean meat, poultry, fish, and eggs.  You can also have tofu, beans, peas, lentils, nuts, and seeds.        Dairy   Try milk, yogurt, and cheese.  Choose low-fat or fat-free when you can.  If you need to, use lactose-free milk or fortified plant-based milk products, such as soy milk.        Water   Drink water when you're thirsty.  Limit sugar-sweetened drinks, including soda, fruit drinks, and sports drinks.  Where can you learn more?  Go to https://www.[employer].net/patiented  Enter T756 in the search box to learn more about \"Eating Healthy Foods: Care Instructions.\"  Current as of: September 20, 2023  Content Version: 14.3    2024 [employer].   Care instructions adapted under license by your healthcare professional. If you have questions about a medical condition or this instruction, always ask your healthcare professional. [employer] disclaims any warranty or liability for your use of this information.    Preventive Care Advice   This is general advice given by our system to help you stay healthy. However, your care team may have specific advice just for you. Please talk to your care team about your preventive care needs.  Nutrition  Eat 5 or more servings of fruits and vegetables each day.  Try wheat bread, brown rice and whole grain pasta (instead of white bread, rice, and pasta).  Get enough calcium and vitamin D. Check the label on foods and aim for 100% of the RDA (recommended daily allowance).  Lifestyle  Exercise at least 150 minutes each week  (30 minutes a day, 5 days a week).  Do muscle strengthening activities 2 days a week. These help control your weight and prevent disease.  No smoking.  Wear sunscreen to prevent skin cancer.  Have a dental exam and cleaning every 6 months.  Yearly exams  See your health care team every year to talk about:  Any changes in your health.  Any medicines your care team has prescribed.  Preventive care, family planning, and ways to prevent chronic " diseases.  Shots (vaccines)   HPV shots (up to age 26), if you've never had them before.  Hepatitis B shots (up to age 59), if you've never had them before.  COVID-19 shot: Get this shot when it's due.  Flu shot: Get a flu shot every year.  Tetanus shot: Get a tetanus shot every 10 years.  Pneumococcal, hepatitis A, and RSV shots: Ask your care team if you need these based on your risk.  Shingles shot (for age 50 and up)  General health tests  Diabetes screening:  Starting at age 35, Get screened for diabetes at least every 3 years.  If you are younger than age 35, ask your care team if you should be screened for diabetes.  Cholesterol test: At age 39, start having a cholesterol test every 5 years, or more often if advised.  Bone density scan (DEXA): At age 50, ask your care team if you should have this scan for osteoporosis (brittle bones).  Hepatitis C: Get tested at least once in your life.  STIs (sexually transmitted infections)  Before age 24: Ask your care team if you should be screened for STIs.  After age 24: Get screened for STIs if you're at risk. You are at risk for STIs (including HIV) if:  You are sexually active with more than one person.  You don't use condoms every time.  You or a partner was diagnosed with a sexually transmitted infection.  If you are at risk for HIV, ask about PrEP medicine to prevent HIV.  Get tested for HIV at least once in your life, whether you are at risk for HIV or not.  Cancer screening tests  Cervical cancer screening: If you have a cervix, begin getting regular cervical cancer screening tests starting at age 21.  Breast cancer scan (mammogram): If you've ever had breasts, begin having regular mammograms starting at age 40. This is a scan to check for breast cancer.  Colon cancer screening: It is important to start screening for colon cancer at age 45.  Have a colonoscopy test every 10 years (or more often if you're at risk) Or, ask your provider about stool tests like a  FIT test every year or Cologuard test every 3 years.  To learn more about your testing options, visit:   .  For help making a decision, visit:   https://bit.ly/ji73287.  Prostate cancer screening test: If you have a prostate, ask your care team if a prostate cancer screening test (PSA) at age 55 is right for you.  Lung cancer screening: If you are a current or former smoker ages 50 to 80, ask your care team if ongoing lung cancer screenings are right for you.  For informational purposes only. Not to replace the advice of your health care provider. Copyright   2023 [employer]. All rights reserved. Clinically reviewed by the [facility]. [employer] 620317 - REV 01/24.

## 2025-02-25 NOTE — PROGRESS NOTES
"  Assessment & Plan     Routine general medical examination at a health care facility      Hyperlipidemia LDL goal <130  Pending   - Lipid panel reflex to direct LDL Fasting; Future  - simvastatin (ZOCOR) 10 MG tablet; Take 1 tablet (10 mg) by mouth at bedtime.    Hypothyroidism, unspecified type  Pending  Doing well on meds   - TSH with free T4 reflex; Future    Visit for screening mammogram  Advised   - MA Screen Bilateral w/William; Future    Hyperglycemia  Pending   - Hemoglobin A1c; Future  - Glucose; Future    Acquired hypothyroidism    - NP THYROID 60 MG tablet; Take 1 tablet (60 mg) by mouth daily.    Asymptomatic postmenopausal status  Advised   - DEXA HIP/PELVIS/SPINE - Future; Future    Pt declined all Immunizations on HM-she wants to check with her son  Advised she get them at Pharmacy  Advised DEXA  Advised mammogram    The longitudinal plan of care for the diagnosis(es)/condition(s) as documented were addressed during this visit. Due to the added complexity in care, I will continue to support Gary in the subsequent management and with ongoing continuity of care.  BMI  Estimated body mass index is 31.94 kg/m  as calculated from the following:    Height as of this encounter: 1.581 m (5' 2.24\").    Weight as of this encounter: 79.8 kg (176 lb).   Weight management plan: Discussed healthy diet and exercise guidelines      Regular exercise    Subjective   Gary is a 64 year old, presenting for the following health issues:  Recheck Medication        2/25/2025     9:38 AM   Additional Questions   Roomed by Rosaura     History of Present Illness       Hyperlipidemia:  She presents for follow up of hyperlipidemia.   She is taking medication to lower cholesterol. She is not having myalgia or other side effects to statin medications.    Hypothyroidism:     Since last visit, patient describes the following symptoms::  Fatigue    Reason for visit:  Thyroid, cholesterol and allergy medication    She eats 4 or more " servings of fruits and vegetables daily.She consumes 1 sweetened beverage(s) daily.She exercises with enough effort to increase her heart rate 9 or less minutes per day.  She exercises with enough effort to increase her heart rate 3 or less days per week.   She is taking medications regularly.         Annual Wellness Visit     Patient has been advised of split billing requirements and indicates understanding: Yes         Health Care Directive  Patient does not have a Health Care Directive: Discussed advance care planning with patient; information given to patient to review.  In general, how would you rate your overall physical health? good  Do you have a special diet?  Low fat/cholesterol         No data to display              Do you see a dentist two times every year?  (!) NO  The patient was instructed to see the dentist every 6 months.   Have you been more tired than usual lately?  No  If you drink alcohol do you typically have >3 drinks per day or >7 drinks per week? No  Do you have a current opioid prescription? No  Do you use any other controlled substances or medications that are not prescribed by a provider? None  Social History     Tobacco Use    Smoking status: Never    Smokeless tobacco: Never   Vaping Use    Vaping status: Never Used   Substance Use Topics    Alcohol use: No    Drug use: No       Needs assistance for the following daily activities: no assistance needed  Which of the following safety concerns are present in your home?  none identified   Do you (or your family members) have any concerns about your safety while driving?  Does not drive  Do you have any of the following hearing concerns?: No hearing concerns  In the past 6 months, have you been bothered by leaking of urine? No            2/25/2025   Fall Risk   Fallen 2 or more times in the past year? No    Trouble with walking or balance? No        Proxy-reported          Today's PHQ-2 Score:       2/25/2025     9:48 AM   PHQ-2 ( 1999  [employer])   Q1: Little interest or pleasure in doing things 0    Q2: Feeling down, depressed or hopeless 0    PHQ-2 Score 0    Q1: Little interest or pleasure in doing things Not at all   Q2: Feeling down, depressed or hopeless Not at all   PHQ-2 Score 0       Proxy-reported          Pt advised to get mammogram      History of abnormal Pap smear: pt is 64 and over with previous nll pap        Latest Ref Rng & Units 4/3/2023     8:39 AM 3/28/2019     8:24 AM 3/28/2019     8:22 AM   PAP / HPV   PAP  Negative for Intraepithelial Lesion or Malignancy (NILM)      PAP (Historical)    NIL    HPV 16 DNA Negative Negative  Negative     HPV 18 DNA Negative Negative  Negative     Other HR HPV Negative Negative  Negative       ASCVD Risk   The 10-year ASCVD risk score (Soy SANTOYO, et al., 2019) is: 4.8%    Values used to calculate the score:      Age: 64 years      Sex: Female      Is Non- : No      Diabetic: No      Tobacco smoker: No      Systolic Blood Pressure: 120 mmHg      Is BP treated: No      HDL Cholesterol: 59 mg/dL      Total Cholesterol: 199 mg/dL    Fracture Risk Assessment Tool  Link to Frax Calculator  Use the information below to complete the Frax calculator  : 1960  Sex: female  Weight (kg): 79.8 kg (actual weight)  Height (cm): 158.1 cm  Previous Fragility Fracture:  No  History of parent with fractured hip:  No  Current Smoking:  No  Patient has been on glucocorticoids for more than 3 months (5mg/day or more): No  Rheumatoid Arthritis on Problem List:  No  Secondary Osteoporosis on Problem List:  No  Consumes 3 or more units of alcohol per day: No  Femoral Neck BMD (g/cm2)            Reviewed and updated as needed this visit by Provider   Tobacco  Allergies  Meds  Problems  Med Hx  Surg Hx  Fam Hx            Past Medical History:   Diagnosis Date    Hyperlipidemia LDL goal < 130     Hypothyroidism     Lichen sclerosus of female genitalia 10/9/2015    Obesity       Past Surgical History:   Procedure Laterality Date    APPENDECTOMY      ARTHROPLASTY KNEE Right 2022    Procedure: ARTHROPLASTY, RIGHT KNEE, TOTAL;  Surgeon: Shaka De Luna MD;  Location: [facility]    COLONOSCOPY N/A 2024    Procedure: COLONOSCOPY, FLEXIBLE, WITH LESION REMOVAL USING SNARE;  Surgeon: Umu Torres MD;  Location: [facility]    COLONOSCOPY WITH CO2 INSUFFLATION N/A 2024    Procedure: Colonoscopy with CO2 insufflation;  Surgeon: Umu Torres MD;  Location: [facility]     OB History    Para Term  AB Living   2 1 0 0 1 1   SAB IAB Ectopic Multiple Live Births   0 1 0 0 0      # Outcome Date GA Lbr Roberto/2nd Weight Sex Type Anes PTL Lv   2 IAB            1 Para              Lab work is in process  Labs reviewed in EPIC  BP Readings from Last 3 Encounters:   25 120/75   24 130/70   24 124/77    Wt Readings from Last 3 Encounters:   25 79.8 kg (176 lb)   24 77.4 kg (170 lb 9.6 oz)   01/10/24 76.2 kg (168 lb)                  Patient Active Problem List   Diagnosis    Hyperlipidemia LDL goal <130    HDL deficiency    Hypothyroidism    Lichen sclerosus of female genitalia    Obesity    Status post total right knee replacement    Primary insomnia     Past Surgical History:   Procedure Laterality Date    APPENDECTOMY      ARTHROPLASTY KNEE Right 2022    Procedure: ARTHROPLASTY, RIGHT KNEE, TOTAL;  Surgeon: Shaka De Luna MD;  Location: [facility]    COLONOSCOPY N/A 2024    Procedure: COLONOSCOPY, FLEXIBLE, WITH LESION REMOVAL USING SNARE;  Surgeon: Umu Torres MD;  Location: [facility]    COLONOSCOPY WITH CO2 INSUFFLATION N/A 2024    Procedure: Colonoscopy with CO2 insufflation;  Surgeon: Umu Torres MD;  Location: [facility]       Social History     Tobacco Use    Smoking status: Never    Smokeless tobacco: Never   Substance Use Topics    Alcohol use: No     Family History   Problem Relation Age of Onset    C.A.D. Mother         d age 80          Current Outpatient Medications   Medication Sig Dispense Refill    acetaminophen (TYLENOL) 325 MG tablet Take 2 tablets (650 mg) by mouth every 4 hours as needed for other (mild pain) 100 tablet 0    NP THYROID 60 MG tablet Take 1 tablet (60 mg) by mouth daily. 90 tablet 3    simvastatin (ZOCOR) 10 MG tablet Take 1 tablet (10 mg) by mouth at bedtime. 90 tablet 3    Vitamin D3 (CHOLECALCIFEROL) 125 MCG (5000 UT) tablet Take 1 tablet by mouth daily       No Known Allergies  Recent Labs   Lab Test 02/25/25  1055 05/01/24  1129 04/03/23  0856 09/15/22  1644 04/13/22  1022 02/01/22  0739 10/06/21  1635 08/20/20  1040 03/28/19  0835   A1C 6.1* 5.9* 6.0* 5.8*  --   --  5.8*   < >  --    LDL  --  111* 94 92  --   --  108*   < > 105*   HDL  --  59 55 52  --   --  49*   < > 60   TRIG  --  146 134 222*  --   --  187*   < > 138   ALT  --   --   --   --  41  --  36  --  45   CR  --   --  0.55 0.62 0.53  --  0.64   < >  --    GFRESTIMATED  --   --  >90 >90 >90  --  >90   < >  --    POTASSIUM  --   --  4.3 4.5 4.2  --  4.0   < >  --    TSH  --  4.27* 2.62  --   --    < >  --    < > 2.50    < > = values in this interval not displayed.        Current providers sharing in care for this patient include:  Patient Care Team:  Opal Goff MD as PCP - General (Family Medicine)  Shaka De Luna MD as Assigned Musculoskeletal Provider  Opal Goff MD as Assigned PCP    The following health maintenance items are reviewed in Epic and correct as of today:  Health Maintenance   Topic Date Due    DEXA  Never done    Pneumococcal Vaccine: 50+ Years (1 of 1 - PCV) Never done    ZOSTER IMMUNIZATION (1 of 2) Never done    DTAP/TDAP/TD IMMUNIZATION (2 - Td or Tdap) 05/16/2021    INFLUENZA VACCINE (1) 09/01/2024    COVID-19 Vaccine (4 - 2024-25 season) 09/01/2024    LIPID  05/01/2025    TSH W/FREE T4 REFLEX  05/01/2025    MAMMO SCREENING  10/04/2025    MEDICARE ANNUAL WELLNESS VISIT  02/25/2026    A1C  02/25/2026    ANNUAL REVIEW OF HM  "ORDERS  02/25/2026    FALL RISK ASSESSMENT  02/25/2026    GLUCOSE  05/01/2027    COLORECTAL CANCER SCREENING  08/12/2027    ADVANCE CARE PLANNING  02/25/2030    RSV VACCINE (1 - 1-dose 75+ series) 11/03/2034    HEPATITIS C SCREENING  Completed    HIV SCREENING  Completed    PHQ-2 (once per calendar year)  Completed    HPV IMMUNIZATION  Aged Out    MENINGITIS IMMUNIZATION  Aged Out    PAP  Discontinued       Appropriate preventive services were discussed with this patient, including applicable screening as appropriate for fall prevention, nutrition, physical activity, Tobacco-use cessation, weight loss and cognition.  Checklist reviewing preventive services available has been given to the patient.           No data to display                    Pt is doing well      Review of Systems  CONSTITUTIONAL: NEGATIVE for fever, chills, change in weight  INTEGUMENTARY/SKIN: NEGATIVE for worrisome rashes, moles or lesions  EYES: NEGATIVE for vision changes or irritation  ENT/MOUTH: NEGATIVE for ear, mouth and throat problems  RESP: NEGATIVE for significant cough or SOB  BREAST: NEGATIVE for masses, tenderness or discharge  CV: NEGATIVE for chest pain, palpitations or peripheral edema  GI: NEGATIVE for nausea, abdominal pain, heartburn, or change in bowel habits  : NEGATIVE for frequency, dysuria, or hematuria  MUSCULOSKELETAL: NEGATIVE for significant arthralgias or myalgia  NEURO: NEGATIVE for weakness, dizziness or paresthesias  ENDOCRINE: NEGATIVE for temperature intolerance, skin/hair changes  HEME: NEGATIVE for bleeding problems  PSYCHIATRIC: NEGATIVE for changes in mood or affect      Objective    /75   Pulse 77   Temp 97  F (36.1  C) (Temporal)   Resp 16   Ht 1.581 m (5' 2.24\")   Wt 79.8 kg (176 lb)   LMP  (LMP Unknown)   SpO2 97%   BMI 31.94 kg/m    Body mass index is 31.94 kg/m .  Physical Exam   GENERAL: alert and no distress  EYES: Eyes grossly normal to inspection, PERRL and conjunctivae and sclerae " normal  HENT: ear canals and TM's normal, nose and mouth without ulcers or lesions  NECK: no adenopathy, no asymmetry, masses, or scars  RESP: lungs clear to auscultation - no rales, rhonchi or wheezes  BREAST: normal without masses, tenderness or nipple discharge and no palpable axillary masses or adenopathy  CV: regular rate and rhythm, normal S1 S2, no S3 or S4, no murmur, click or rub, no peripheral edema  ABDOMEN: soft, nontender, no hepatosplenomegaly, no masses and bowel sounds normal  MS: no gross musculoskeletal defects noted, no edema  SKIN: no suspicious lesions or rashes  NEURO: Normal strength and tone, mentation intact and speech normal  PSYCH: mentation appears normal, affect normal/bright    Pending         Signed Electronically by: Opal Goff MD

## 2025-02-26 ENCOUNTER — TELEPHONE (OUTPATIENT)
Dept: FAMILY MEDICINE | Facility: CLINIC | Age: 65
End: 2025-02-26
Payer: COMMERCIAL

## 2025-02-26 RX ORDER — SIMVASTATIN 20 MG
20 TABLET ORAL AT BEDTIME
Qty: 90 TABLET | Refills: 3 | Status: SHIPPED | OUTPATIENT
Start: 2025-02-26

## 2025-02-26 NOTE — TELEPHONE ENCOUNTER
" MARICARMEN w/callback [phone]    Please call  cholesterol is High  Increase zocor to 20 mg daily  Low cholesterol diet  See me  in 6 weeks    Dr. Goff posted this message in pt communication for information on Cholesterol    Learning About High Cholesterol  What is high cholesterol?  High cholesterol means that you have too much cholesterol in your blood. Cholesterol is a type of fat. It's needed for many body functions, such as making new cells. It's made by your body. It also comes from food you eat (meat and dairy products).  Having high cholesterol can lead to the buildup of fatty deposits called plaque (say \"plak\") in artery walls. This can increase your risk of heart attack and stroke.        When doctors talk about high cholesterol, they are talking about your total cholesterol and LDL cholesterol levels. LDL is sometimes called the \"bad\" cholesterol.  Your doctor may also speak about HDL levels. HDL is sometimes called the \"good\" cholesterol. High HDL is linked with a lower risk for heart attack and stroke.  How can you help prevent high cholesterol?       Eat high-fiber foods, such as fruits, vegetables, and whole grains.      Eat lean proteins, such as seafood, lean meats, and beans.      Eat healthy fats, such as canola and olive oil.      Choose foods that are low in saturated fat, such as avocados, nuts, and low-fat milk or yogurt.      Limit sodium and alcohol.      Limit drinks and foods with added sugar.      Try to be active at least 2  hours a week.      Get to and stay at a healthy weight.      Don't use tobacco or nicotine, and talk to your doctor if you need help quitting.    How is high cholesterol treated?        Treatment focuses on heart-healthy lifestyle changes. It may also include medicines.  Treatment reduces your risk for a heart attack or stroke. The goal of treatment is to lower your LDL cholesterol levels.  Where can you learn more?  Go to " "https://www.[employer].net/patiented  Enter Q621 in the search box to learn more about \"Learning About High Cholesterol.\"  Current as of: October 24, 2023  Content Version: 14.3    2024 [employer].  Care instructions adapted under license by your healthcare professional. If you have questions about a medical condition or this instruction, always ask your healthcare professional. [employer] disclaims any warranty or liability for your use of th    Kimmy Wynne RN on 2/26/2025 at 10:58 AM    "

## 2025-02-27 ENCOUNTER — TELEPHONE (OUTPATIENT)
Dept: FAMILY MEDICINE | Facility: CLINIC | Age: 65
End: 2025-02-27
Payer: COMMERCIAL

## 2025-02-27 DIAGNOSIS — R21 RASH: Primary | ICD-10-CM

## 2025-02-27 RX ORDER — TRIAMCINOLONE ACETONIDE 1 MG/G
CREAM TOPICAL 2 TIMES DAILY
Qty: 45 G | Refills: 0 | Status: SHIPPED | OUTPATIENT
Start: 2025-02-27

## 2025-02-27 NOTE — TELEPHONE ENCOUNTER
Son calling in. No consent on file. Nurse did not give pt specific details not already known by pt's son.    There must have been some sort of miscommunication in regards to patients allergy cream. Pt requesting kenalog be refilled, looks like it was discontinued on last appointment 2/25.    Kimmy Wynne, RN on 2/27/2025 at 11:54 AM

## 2025-02-27 NOTE — TELEPHONE ENCOUNTER
Son calling in tomorrow with his mother for lab results.    Kimmy Wynne RN on 2/27/2025 at 11:51 AM

## 2025-02-28 NOTE — TELEPHONE ENCOUNTER
2nd attempt. Called patient with assistance of Mandarin . Left voice message to return call at [phone].     Shyanne Jay RN

## 2025-03-01 DIAGNOSIS — L23.9 ALLERGIC CONTACT DERMATITIS, UNSPECIFIED TRIGGER: ICD-10-CM

## 2025-03-03 RX ORDER — PREDNISONE 20 MG/1
20 TABLET ORAL 2 TIMES DAILY
Qty: 10 TABLET | Refills: 0 | OUTPATIENT
Start: 2025-03-03 | End: 2025-03-08

## 2025-03-03 RX ORDER — CETIRIZINE HYDROCHLORIDE 10 MG/1
10 TABLET ORAL DAILY
Qty: 30 TABLET | Refills: 0 | Status: SHIPPED | OUTPATIENT
Start: 2025-03-03

## 2025-03-03 NOTE — TELEPHONE ENCOUNTER
With assistance of Mandarin , spoke to patient and reviewed message from Dr. Goff below. Pt states she already picked up the new dose of simvastatin from the pharmacy and has started this new dose. Scheduled her for follow up with Dr. Goff on 4/14. Pt had no further questions.     Jacey RENTERIA RN  [facility]

## 2025-03-03 NOTE — TELEPHONE ENCOUNTER
Attempt #3 to call patient.     With assistance of Mandarin , left voicemail for pt home number and requested return call to [facility] at [phone].     Called pt's listed mobile number and her son Juan answered. He requested that we call this number back in 20 minutes and he will have the patient be able to answer our call.     Jacey RENTERIA RN  [facility]

## 2025-03-11 ENCOUNTER — ANCILLARY PROCEDURE (OUTPATIENT)
Dept: BONE DENSITY | Facility: CLINIC | Age: 65
End: 2025-03-11
Attending: FAMILY MEDICINE
Payer: COMMERCIAL

## 2025-03-11 DIAGNOSIS — Z78.0 ASYMPTOMATIC POSTMENOPAUSAL STATUS: ICD-10-CM

## 2025-03-11 PROCEDURE — 77080 DXA BONE DENSITY AXIAL: CPT | Mod: TC | Performed by: PHYSICIAN ASSISTANT

## 2025-04-14 ENCOUNTER — OFFICE VISIT (OUTPATIENT)
Dept: FAMILY MEDICINE | Facility: CLINIC | Age: 65
End: 2025-04-14
Payer: COMMERCIAL

## 2025-04-14 VITALS
HEIGHT: 62 IN | HEART RATE: 60 BPM | RESPIRATION RATE: 16 BRPM | BODY MASS INDEX: 32.39 KG/M2 | OXYGEN SATURATION: 97 % | SYSTOLIC BLOOD PRESSURE: 121 MMHG | DIASTOLIC BLOOD PRESSURE: 75 MMHG | TEMPERATURE: 96.2 F | WEIGHT: 176 LBS

## 2025-04-14 DIAGNOSIS — E78.5 HYPERLIPIDEMIA LDL GOAL <130: Primary | ICD-10-CM

## 2025-04-14 DIAGNOSIS — M81.0 AGE-RELATED OSTEOPOROSIS WITHOUT CURRENT PATHOLOGICAL FRACTURE: ICD-10-CM

## 2025-04-14 LAB
CALCIUM SERPL-MCNC: 9.4 MG/DL (ref 8.8–10.4)
CHOLEST SERPL-MCNC: 169 MG/DL
FASTING STATUS PATIENT QL REPORTED: YES
HDLC SERPL-MCNC: 50 MG/DL
LDLC SERPL CALC-MCNC: 91 MG/DL
NONHDLC SERPL-MCNC: 119 MG/DL
PHOSPHATE SERPL-MCNC: 3.8 MG/DL (ref 2.5–4.5)
PTH-INTACT SERPL-MCNC: 29 PG/ML (ref 15–65)
TRIGL SERPL-MCNC: 141 MG/DL
VIT D+METAB SERPL-MCNC: 40 NG/ML (ref 20–50)

## 2025-04-14 PROCEDURE — 82310 ASSAY OF CALCIUM: CPT | Performed by: FAMILY MEDICINE

## 2025-04-14 PROCEDURE — 84100 ASSAY OF PHOSPHORUS: CPT | Performed by: FAMILY MEDICINE

## 2025-04-14 PROCEDURE — G2211 COMPLEX E/M VISIT ADD ON: HCPCS | Performed by: FAMILY MEDICINE

## 2025-04-14 PROCEDURE — 83970 ASSAY OF PARATHORMONE: CPT | Performed by: FAMILY MEDICINE

## 2025-04-14 PROCEDURE — 99214 OFFICE O/P EST MOD 30 MIN: CPT | Performed by: FAMILY MEDICINE

## 2025-04-14 PROCEDURE — 36415 COLL VENOUS BLD VENIPUNCTURE: CPT | Performed by: FAMILY MEDICINE

## 2025-04-14 PROCEDURE — 80061 LIPID PANEL: CPT | Performed by: FAMILY MEDICINE

## 2025-04-14 PROCEDURE — 82306 VITAMIN D 25 HYDROXY: CPT | Performed by: FAMILY MEDICINE

## 2025-04-14 RX ORDER — ALENDRONATE SODIUM 70 MG/1
70 TABLET ORAL
Qty: 4 TABLET | Refills: 11 | Status: SHIPPED | OUTPATIENT
Start: 2025-04-14

## 2025-04-14 ASSESSMENT — PAIN SCALES - GENERAL: PAINLEVEL_OUTOF10: NO PAIN (0)

## 2025-04-14 NOTE — PROGRESS NOTES
"  Assessment & Plan     Hyperlipidemia LDL goal <130  On zocor   Doing well  Labs pending   Low chol diet   - Lipid panel reflex to direct LDL Fasting; Future  - Lipid panel reflex to direct LDL Fasting    Age-related osteoporosis without current pathological fracture  Discussed meds  SEE [facility] care orders  The potential side effects of this medication have been discussed with the patient.  Call if any significant problems with these are experienced.  Handouts discussed   Advised weight bearing Exercise  Vitamin D 100 Units daily   - Parathyroid Hormone Intact; Future  - Vitamin D Deficiency; Future  - Phosphorus; Future  - Calcium; Future  - alendronate (FOSAMAX) 70 MG tablet; Take 1 tablet (70 mg) by mouth every 7 days.  - Parathyroid Hormone Intact  - Vitamin D Deficiency  - Phosphorus  - Calcium      Discussed above with Phone     Kori Vega is a 64 year old, presenting for the following health issues:  Recheck Medication        4/14/2025     7:25 AM   Additional Questions   Roomed by Rosaura     History of Present Illness       Hyperlipidemia:  She presents for follow up of hyperlipidemia.   She is taking medication to lower cholesterol. She is not having myalgia or other side effects to statin medications.    She eats 4 or more servings of fruits and vegetables daily.She consumes 0 sweetened beverage(s) daily.She exercises with enough effort to increase her heart rate 10 to 19 minutes per day.  She exercises with enough effort to increase her heart rate 3 or less days per week.   She is taking medications regularly.      Also has osteoporosis on Dexa  No fractures  On no steroids  No family history of Hip Fractures  No alcohol  Dexa reviewed             Review of Systems  Rest of the ROS is Negative except see above and Problem list [stable]        Objective    /75   Pulse 60   Temp (!) 96.2  F (35.7  C) (Temporal)   Resp 16   Ht 1.581 m (5' 2.24\")   Wt 79.8 kg (176 lb)   LMP  (LMP " Unknown)   SpO2 97%   BMI 31.94 kg/m    Body mass index is 31.94 kg/m .  Physical Exam   GENERAL: alert and no distress  RESP: lungs clear to auscultation - no rales, rhonchi or wheezes  CV: regular rate and rhythm, normal S1 S2, no S3 or S4, no murmur, click or rub, no peripheral edema  ABDOMEN: soft, nontender, no hepatosplenomegaly, no masses and bowel sounds normal  MS: no gross musculoskeletal defects noted, no edema    Pending         Signed Electronically by: Opal Goff MD

## 2025-04-15 PROBLEM — R73.03 PRE-DIABETES: Status: ACTIVE | Noted: 2025-04-15

## (undated) DEVICE — DRAPE CONVERTORS U-DRAPE 60X72" 8476

## (undated) DEVICE — Device

## (undated) DEVICE — BONE CLEANING TIP INTERPULSE  0210-010-000

## (undated) DEVICE — SU VICRYL 0 CT-1 36" J946H

## (undated) DEVICE — HOOD T4 PROTECTIVE STERI FACE SHIELD 400-800

## (undated) DEVICE — SUCTION MANIFOLD NEPTUNE 2 SYS 4 PORT 0702-020-000

## (undated) DEVICE — CUFF TOURN 24IN STRL DISP

## (undated) DEVICE — DECANTER VIAL 2006S

## (undated) DEVICE — SOL NACL 0.9% IRRIG 3000ML BAG 07972-08

## (undated) DEVICE — SU PDO 1 STRATAFIX 36X36CM CTX TAPERPOINT SXPD2B405

## (undated) DEVICE — SUCTION IRR SYSTEM W/O TIP INTERPULSE HANDPIECE 0210-100-000

## (undated) DEVICE — BLADE SAW SAGITTAL STRK 25X90X1.37MM 4H SYS 6 6125-137-090

## (undated) DEVICE — DRAPE IOBAN LG .375X23.5" 6648EZ

## (undated) DEVICE — BLADE SAW SAGITTAL STRK 18X90X1.27MM HD SYS 6 6118-127-090

## (undated) DEVICE — PAD CHUX UNDERPAD 23X24" 7136

## (undated) DEVICE — KIT ENDO FIRST STEP DISINFECTANT 200ML W/POUCH EP-4

## (undated) DEVICE — DRAPE STERI U 1015

## (undated) DEVICE — GLOVE PROTEXIS W/NEU-THERA 7.5  2D73TE75

## (undated) DEVICE — GLOVE PROTEXIS BLUE W/NEU-THERA 7.5  2D73EB75

## (undated) DEVICE — PREP CHLORAPREP 26ML TINTED ORANGE  260815

## (undated) DEVICE — DRAPE SHEET REV FOLD 3/4 9349

## (undated) DEVICE — SYR 20ML LL W/O NDL

## (undated) DEVICE — ADH SKIN CLOSURE PREMIERPRO EXOFIN 1.0ML 3470

## (undated) DEVICE — ESU PENCIL SMOKE EVAC W/ROCKER SWITCH 0703-047-000

## (undated) DEVICE — BONE CEMENT KIT BOWL AND SPATULA STRK 6201-3-410

## (undated) DEVICE — GLOVE PROTEXIS W/NEU-THERA 8.0  2D73TE80

## (undated) DEVICE — SU MONOCRYL 3-0 SH 27" UND Y416H

## (undated) DEVICE — NDL SPINAL 18GA 3.5" 405184

## (undated) DEVICE — GOWN XLG DISP 9545

## (undated) DEVICE — DRSG AQUACEL AG 3.5X9.75" HYDROFIBER 412011

## (undated) DEVICE — SU ETHIBOND 0 CT-1 CR 8X18" CX21D

## (undated) DEVICE — SU MONOCRYL 3-0 SH 27" Y316H

## (undated) DEVICE — IMM KNEE 24" 79-80030

## (undated) DEVICE — GLOVE PROTEXIS BLUE W/NEU-THERA 8.0  2D73EB80

## (undated) RX ORDER — FENTANYL CITRATE 50 UG/ML
INJECTION, SOLUTION INTRAMUSCULAR; INTRAVENOUS
Status: DISPENSED
Start: 2024-08-12

## (undated) RX ORDER — OXYCODONE HYDROCHLORIDE 5 MG/1
TABLET ORAL
Status: DISPENSED
Start: 2022-09-27

## (undated) RX ORDER — ACETAMINOPHEN 325 MG/1
TABLET ORAL
Status: DISPENSED
Start: 2022-09-27

## (undated) RX ORDER — TRANEXAMIC ACID 650 MG/1
TABLET ORAL
Status: DISPENSED
Start: 2022-09-27

## (undated) RX ORDER — CEFAZOLIN SODIUM 1 G/3ML
INJECTION, POWDER, FOR SOLUTION INTRAMUSCULAR; INTRAVENOUS
Status: DISPENSED
Start: 2022-09-27

## (undated) RX ORDER — BUPIVACAINE HYDROCHLORIDE 5 MG/ML
INJECTION, SOLUTION EPIDURAL; INTRACAUDAL
Status: DISPENSED
Start: 2022-09-27

## (undated) RX ORDER — HYDROXYZINE HYDROCHLORIDE 25 MG/1
TABLET, FILM COATED ORAL
Status: DISPENSED
Start: 2022-09-27

## (undated) RX ORDER — FENTANYL CITRATE 50 UG/ML
INJECTION, SOLUTION INTRAMUSCULAR; INTRAVENOUS
Status: DISPENSED
Start: 2022-09-27

## (undated) RX ORDER — VANCOMYCIN HYDROCHLORIDE 1 G/20ML
INJECTION, POWDER, LYOPHILIZED, FOR SOLUTION INTRAVENOUS
Status: DISPENSED
Start: 2022-09-27

## (undated) RX ORDER — CEFAZOLIN SODIUM/WATER 2 G/20 ML
SYRINGE (ML) INTRAVENOUS
Status: DISPENSED
Start: 2022-09-27

## (undated) RX ORDER — PROPOFOL 10 MG/ML
INJECTION, EMULSION INTRAVENOUS
Status: DISPENSED
Start: 2022-09-27

## (undated) RX ORDER — ROPIVACAINE HYDROCHLORIDE 5 MG/ML
INJECTION, SOLUTION EPIDURAL; INFILTRATION; PERINEURAL
Status: DISPENSED
Start: 2022-09-27

## (undated) RX ORDER — FENTANYL CITRATE-0.9 % NACL/PF 10 MCG/ML
PLASTIC BAG, INJECTION (ML) INTRAVENOUS
Status: DISPENSED
Start: 2022-09-27

## (undated) RX ORDER — GLYCOPYRROLATE 0.2 MG/ML
INJECTION, SOLUTION INTRAMUSCULAR; INTRAVENOUS
Status: DISPENSED
Start: 2022-09-27

## (undated) RX ORDER — ONDANSETRON 2 MG/ML
INJECTION INTRAMUSCULAR; INTRAVENOUS
Status: DISPENSED
Start: 2022-09-27